# Patient Record
Sex: MALE | Race: WHITE | NOT HISPANIC OR LATINO | Employment: OTHER | URBAN - METROPOLITAN AREA
[De-identification: names, ages, dates, MRNs, and addresses within clinical notes are randomized per-mention and may not be internally consistent; named-entity substitution may affect disease eponyms.]

---

## 2023-03-05 PROCEDURE — 99308 SBSQ NF CARE LOW MDM 20: CPT | Performed by: INTERNAL MEDICINE

## 2023-04-02 PROCEDURE — 99308 SBSQ NF CARE LOW MDM 20: CPT | Performed by: INTERNAL MEDICINE

## 2023-05-02 ENCOUNTER — NURSING HOME VISIT (OUTPATIENT)
Dept: POST ACUTE CARE | Facility: EXTERNAL LOCATION | Age: 88
End: 2023-05-02
Payer: COMMERCIAL

## 2023-05-02 DIAGNOSIS — N40.0 BENIGN PROSTATIC HYPERPLASIA, UNSPECIFIED WHETHER LOWER URINARY TRACT SYMPTOMS PRESENT: ICD-10-CM

## 2023-05-02 DIAGNOSIS — E11.9 TYPE 2 DIABETES MELLITUS WITHOUT COMPLICATION, WITH LONG-TERM CURRENT USE OF INSULIN (MULTI): Primary | ICD-10-CM

## 2023-05-02 DIAGNOSIS — I25.10 CORONARY ARTERY DISEASE, UNSPECIFIED VESSEL OR LESION TYPE, UNSPECIFIED WHETHER ANGINA PRESENT, UNSPECIFIED WHETHER NATIVE OR TRANSPLANTED HEART: ICD-10-CM

## 2023-05-02 DIAGNOSIS — Z79.4 TYPE 2 DIABETES MELLITUS WITHOUT COMPLICATION, WITH LONG-TERM CURRENT USE OF INSULIN (MULTI): Primary | ICD-10-CM

## 2023-05-02 DIAGNOSIS — E56.9 VITAMIN DEFICIENCY: ICD-10-CM

## 2023-05-02 DIAGNOSIS — E03.9 HYPOTHYROIDISM, UNSPECIFIED TYPE: ICD-10-CM

## 2023-05-02 DIAGNOSIS — I10 ESSENTIAL HYPERTENSION: ICD-10-CM

## 2023-05-02 PROCEDURE — 99309 SBSQ NF CARE MODERATE MDM 30: CPT | Performed by: NURSE PRACTITIONER

## 2023-05-02 NOTE — LETTER
Patient: Clive Woody  : 1934    Encounter Date: 2023    *Provider Impression*  Patient is a 89 year y/o male who is seen today for management of multiple medical problems  #T2DM - lantus 10units QHS, glipizide 5mg daily; tradjenta 5mg daily, metformin 1000mg BID  #Vitamin D deficiency - vitamin D 2000units daily  #Hypothyroidism - levothyroxine 88mcg daily  #BPH - finasteride 5mg daily, tamsulosin 0.4mg QPM  #HTN / CAD - amlodipine 2.5mg daily, atorvastatin 5mg daily, lisinopril 10mg daily  #Psychosis - mgmt per [employer] - risperidone, trileptal, ativan  Follow up as needed      *Chief Complaint*  follow up     *History of Present Illness*  Pt is an 88 y/o male LTC resident of [facility] w/ PMH as below who is seen today for follow up and management of multiple medical problems.    He has continued to refuse labs    He is seen today ambulating in the hallway. He is unable to provide any history d/t aphasia.    Allergies - Tuberculin  PMH - HTN, CAD, T2DM, Hypothyroidism, BPH  PSH - unobtainable  FH - unobtainable  SocHx - No EtOH, non smoker      *Review of Systems*  All other systems reviewed are negative except as noted in the HPI      *Vitals*  Date: 23 - T: 97.7 P: 82 R: 18 BP: 134/81 SpO2: 98% on RA; Date: 23 - Wt: 194      *Results/Data*  CBC - Date: 22 WBC: 6.29 HGB: 12.5 HCT: 41.3 PLT: 273 ;   BMP - Date: 22 Na: 141 K: 4.19 Cl: 105 CO2: 27.4 BUN: 17.4 Cr: 1.12 Glu: 234.6 Ca: 9.21 Alb: 4.11 ;   LFT - Date: 22 AST: 12.2 ALT: 10 ALP: 90 Tbili: 0.63 ;   Coags - Date: INR: PT:  Other - Date: 20 - TSH: 1.430, HgbA1c: 6.6%; 20 - CRP: 25.7 D-Dimer: 1.31; 10/4/21 - A1c: 12.7 TSH: 3.100 25-OH-D: 14.29; 3/29/22 - TSH: 2.69; 22 - TSH: 2.44 25-OH-D: 26.44 A1c: 9.7     *Physical Exam*  Gen: (+) NAD, (+) well-appearing  HEENT: (+) normocephalic, (+) MMM, (+) anicteric  Neck: (-) JVD  Lungs: (-) cough, (+) unlabored respirations  Abd: (+) ND  Ext: (-)  edema, (-) deformity  MSK: (-) joint swelling  Skin: (-) rash  Neuro: (+) follows commands, (-) tremor, (+) alert      Electronically Signed By: RIMA Potter   5/7/23  1:01 PM

## 2023-05-07 NOTE — PROGRESS NOTES
*Provider Impression*  Patient is a 89 year y/o male who is seen today for management of multiple medical problems  #T2DM - lantus 10units QHS, glipizide 5mg daily; tradjenta 5mg daily, metformin 1000mg BID  #Vitamin D deficiency - vitamin D 2000units daily  #Hypothyroidism - levothyroxine 88mcg daily  #BPH - finasteride 5mg daily, tamsulosin 0.4mg QPM  #HTN / CAD - amlodipine 2.5mg daily, atorvastatin 5mg daily, lisinopril 10mg daily  #Psychosis - mgmt per [employer] - risperidone, trileptal, ativan  Follow up as needed      *Chief Complaint*  follow up     *History of Present Illness*  Pt is an 88 y/o male LTC resident of [facility] w/ PMH as below who is seen today for follow up and management of multiple medical problems.    He has continued to refuse labs    He is seen today ambulating in the hallway. He is unable to provide any history d/t aphasia.    Allergies - Tuberculin  PMH - HTN, CAD, T2DM, Hypothyroidism, BPH  PSH - unobtainable  FH - unobtainable  SocHx - No EtOH, non smoker      *Review of Systems*  All other systems reviewed are negative except as noted in the HPI      *Vitals*  Date: 4/30/23 - T: 97.7 P: 82 R: 18 BP: 134/81 SpO2: 98% on RA; Date: 5/2/23 - Wt: 194      *Results/Data*  CBC - Date: 4/26/22 WBC: 6.29 HGB: 12.5 HCT: 41.3 PLT: 273 ;   BMP - Date: 4/26/22 Na: 141 K: 4.19 Cl: 105 CO2: 27.4 BUN: 17.4 Cr: 1.12 Glu: 234.6 Ca: 9.21 Alb: 4.11 ;   LFT - Date: 4/26/22 AST: 12.2 ALT: 10 ALP: 90 Tbili: 0.63 ;   Coags - Date: INR: PT:  Other - Date: 5/19/20 - TSH: 1.430, HgbA1c: 6.6%; 11/12/20 - CRP: 25.7 D-Dimer: 1.31; 10/4/21 - A1c: 12.7 TSH: 3.100 25-OH-D: 14.29; 3/29/22 - TSH: 2.69; 4/26/22 - TSH: 2.44 25-OH-D: 26.44 A1c: 9.7     *Physical Exam*  Gen: (+) NAD, (+) well-appearing  HEENT: (+) normocephalic, (+) MMM, (+) anicteric  Neck: (-) JVD  Lungs: (-) cough, (+) unlabored respirations  Abd: (+) ND  Ext: (-) edema, (-) deformity  MSK: (-) joint swelling  Skin: (-) rash  Neuro: (+)  follows commands, (-) tremor, (+) alert

## 2023-06-04 ENCOUNTER — NURSING HOME VISIT (OUTPATIENT)
Dept: POST ACUTE CARE | Facility: EXTERNAL LOCATION | Age: 88
End: 2023-06-04
Payer: COMMERCIAL

## 2023-06-04 DIAGNOSIS — G40.89 OTHER SEIZURES (MULTI): ICD-10-CM

## 2023-06-04 DIAGNOSIS — E03.8 OTHER SPECIFIED HYPOTHYROIDISM: ICD-10-CM

## 2023-06-04 DIAGNOSIS — I10 BENIGN HYPERTENSION: ICD-10-CM

## 2023-06-04 DIAGNOSIS — F20.89 OTHER SCHIZOPHRENIA (MULTI): ICD-10-CM

## 2023-06-04 DIAGNOSIS — R53.1 ASTHENIA: ICD-10-CM

## 2023-06-04 DIAGNOSIS — E11.9 DIABETES MELLITUS WITHOUT COMPLICATION (MULTI): ICD-10-CM

## 2023-06-04 DIAGNOSIS — F03.90 DEMENTIA, UNSPECIFIED DEMENTIA SEVERITY, UNSPECIFIED DEMENTIA TYPE, UNSPECIFIED WHETHER BEHAVIORAL, PSYCHOTIC, OR MOOD DISTURBANCE OR ANXIETY (MULTI): Primary | ICD-10-CM

## 2023-06-04 PROCEDURE — 99307 SBSQ NF CARE SF MDM 10: CPT | Performed by: INTERNAL MEDICINE

## 2023-06-04 NOTE — LETTER
Patient: Clive Woody  : 1934    Encounter Date: 2023    NAME OF THE PATIENT: Clive Woody    YOB: 1934    PLACE OF SERVICE:  [facility]    This is a subsequent visit.    HPI: Mr. Clive Woody is an 89-year-old male with a history of dementia with seizure disorder and schizophrenia. He is unable to care for himself and manage his affairs.  He requires supportive care.    PAST MEDICAL HISTORY:  As per nursing home list.  Dementia, weakness, psychosis, seizures, hypertension, diabetes, hypothyroidism, aphasia, schizophrenia.    CURRENT MEDICATIONS:  As per nursing home list.  Medications reviewed.    ALLERGIES:  As per nursing home list.  No known allergies.    SOCIAL HISTORY:  As per nursing home list. The patient is a nonsmoker and nondrinker.    FAMILY HISTORY:  As per nursing home list.  The patient does not recall his family.    REVIEW OF SYSTEMS:  All 12 systems reviewed and pertaining covered in history and physical, the rest are negative. The patient denies any fever, chills, or chest pain at this time.    PHYSICAL EXAMINATION  GENERAL: This is a well-developed, well-nourished male, sitting in a chair, in no acute distress.  VITAL SIGNS:  As recorded and reviewed from EMR.  Blood pressure 124/80.  Pulse 76.  Respirations 16.  Temperature 97.5.  EYES:  The patient's sclerae white.  The pupils were equal and round.  ENT:  The patient's external ears were normal and the otoscopic examination was negative.  NECK:  Supple.  There were no palpable masses.  Thyroid was not enlarged and there were no carotid bruits.  RESPIRATORY:  The patient had normal inspirations and expirations.  The breath sounds were equal bilaterally and clear to auscultation.  CARDIOVASCULAR:  The patient had S1 normal, split S2 without obvious rubs, clicks, or murmurs.  GASTROINTESTINAL:  There was no hepatosplenomegaly.  There were no palpable masses and no inguinal nodes.  LYMPHATIC:  The  patient had no axillary, groin, or lymphadenopathy.  MUSCULOSKELETAL:  The patient had normal gait.  The joints appeared to be normal without evidence of deformity.  Grossly the muscles had good range of motion and were without effusion.  EXTREMITIES:  There was no evidence of peripheral edema.  NEUROLOGIC:  The patient had normal cranial nerves.  The reflexes, sensory, and motor examination were grossly within normal limits.  PSYCHIATRIC: The patient had normal judgment and insight.  The patient had no obvious mood defect including depression, anxiety, and/or agitation.  MENTAL STATUS EXAMINATION: The patient is alert and oriented x2.    LAB WORK: Laboratory studies were reviewed from prior visit.    ASSESSMENT AND PLAN:  Dementia, unchanged.  Seizure disorder, on anti-epileptic.  Schizophrenia, on medication.  Weakness, on PT/OT.  Fall risk, on fall precaution.  Hypertension, med controlled.  Hypothyroidism, on levothyroxine.  Diabetes, on insulin.  Medication list reviewed and discussed with the family.  Hospital chart reviewed.       Electronically Signed By: Sergio Islas MD   8/1/23  6:43 PM

## 2023-07-03 ENCOUNTER — NURSING HOME VISIT (OUTPATIENT)
Dept: POST ACUTE CARE | Facility: EXTERNAL LOCATION | Age: 88
End: 2023-07-03
Payer: COMMERCIAL

## 2023-07-03 DIAGNOSIS — I10 ESSENTIAL HYPERTENSION: ICD-10-CM

## 2023-07-03 DIAGNOSIS — Z79.4 TYPE 2 DIABETES MELLITUS WITHOUT COMPLICATION, WITH LONG-TERM CURRENT USE OF INSULIN (MULTI): Primary | ICD-10-CM

## 2023-07-03 DIAGNOSIS — N40.0 BENIGN PROSTATIC HYPERPLASIA, UNSPECIFIED WHETHER LOWER URINARY TRACT SYMPTOMS PRESENT: ICD-10-CM

## 2023-07-03 DIAGNOSIS — E11.9 TYPE 2 DIABETES MELLITUS WITHOUT COMPLICATION, WITH LONG-TERM CURRENT USE OF INSULIN (MULTI): Primary | ICD-10-CM

## 2023-07-03 DIAGNOSIS — I25.10 CORONARY ARTERY DISEASE, UNSPECIFIED VESSEL OR LESION TYPE, UNSPECIFIED WHETHER ANGINA PRESENT, UNSPECIFIED WHETHER NATIVE OR TRANSPLANTED HEART: ICD-10-CM

## 2023-07-03 DIAGNOSIS — E56.9 VITAMIN DEFICIENCY: ICD-10-CM

## 2023-07-03 DIAGNOSIS — E03.9 HYPOTHYROIDISM, UNSPECIFIED TYPE: ICD-10-CM

## 2023-07-03 PROCEDURE — 99309 SBSQ NF CARE MODERATE MDM 30: CPT | Performed by: NURSE PRACTITIONER

## 2023-07-07 NOTE — PROGRESS NOTES
*Provider Impression*  Patient is a 89 year y/o male who is seen today for management of multiple medical problems  #T2DM - lantus 10units QHS, glipizide 5mg daily; tradjenta 5mg daily, metformin 1000mg BID  #Vitamin D deficiency - vitamin D 2000units daily  #Hypothyroidism - levothyroxine 88mcg daily  #BPH - finasteride 5mg daily, tamsulosin 0.4mg QPM  #HTN / CAD - amlodipine 2.5mg daily, atorvastatin 5mg daily, lisinopril 10mg daily  #Psychosis - mgmt per [employer] - risperidone, trileptal  Follow up as needed      *Chief Complaint*  follow up     *History of Present Illness*  Pt is an 88 y/o male LTC resident of [facility] w/ PMH as below who is seen today for follow up and management of multiple medical problems.    Still not permitting labs to be drawn.    He is seen today ambulating in the hallway. He is unable to provide any history d/t aphasia.    Allergies - Tuberculin  PMH - HTN, CAD, T2DM, Hypothyroidism, BPH  PSH - unobtainable  FH - unobtainable  SocHx - No EtOH, non smoker      *Review of Systems*  All other systems reviewed are negative except as noted in the HPI      *Vitals*  Date: 6/25/23 - T: 97.9 P: 81 R: 17 BP: 133/79 SpO2: 98% on RA; Date: 6/1/23 - Wt: 193      *Results/Data*  CBC - Date: 4/26/22 WBC: 6.29 HGB: 12.5 HCT: 41.3 PLT: 273 ;   BMP - Date: 4/26/22 Na: 141 K: 4.19 Cl: 105 CO2: 27.4 BUN: 17.4 Cr: 1.12 Glu: 234.6 Ca: 9.21 Alb: 4.11 ;   LFT - Date: 4/26/22 AST: 12.2 ALT: 10 ALP: 90 Tbili: 0.63 ;   Coags - Date: INR: PT:  Other - Date: 5/19/20 - TSH: 1.430, HgbA1c: 6.6%; 11/12/20 - CRP: 25.7 D-Dimer: 1.31; 10/4/21 - A1c: 12.7 TSH: 3.100 25-OH-D: 14.29; 3/29/22 - TSH: 2.69; 4/26/22 - TSH: 2.44 25-OH-D: 26.44 A1c: 9.7     *Physical Exam*  Gen: (+) NAD, (+) well-appearing  HEENT: (+) normocephalic, (+) MMM, (+) anicteric  Neck: (-) JVD  Lungs: (-) cough, (+) unlabored respirations  Abd: (+) ND  Ext: (-) edema, (-) deformity  MSK: (-) joint swelling  Skin: (-) rash  Neuro: (+)  follows commands, (-) tremor, (+) alert

## 2023-07-26 PROBLEM — G40.89 OTHER SEIZURES (MULTI): Status: ACTIVE | Noted: 2023-07-26

## 2023-07-26 PROBLEM — F20.89 OTHER SCHIZOPHRENIA (MULTI): Status: ACTIVE | Noted: 2023-07-26

## 2023-07-26 PROBLEM — E11.9 DIABETES MELLITUS WITHOUT COMPLICATION (MULTI): Status: ACTIVE | Noted: 2023-07-26

## 2023-07-26 PROBLEM — E03.8 OTHER SPECIFIED HYPOTHYROIDISM: Status: ACTIVE | Noted: 2023-07-26

## 2023-07-26 PROBLEM — I10 BENIGN HYPERTENSION: Status: ACTIVE | Noted: 2023-07-26

## 2023-07-26 PROBLEM — F03.90 DEMENTIA (MULTI): Status: ACTIVE | Noted: 2023-07-26

## 2023-07-26 PROBLEM — R53.1 ASTHENIA: Status: ACTIVE | Noted: 2023-07-26

## 2023-08-05 ENCOUNTER — NURSING HOME VISIT (OUTPATIENT)
Dept: POST ACUTE CARE | Facility: EXTERNAL LOCATION | Age: 88
End: 2023-08-05
Payer: COMMERCIAL

## 2023-08-05 DIAGNOSIS — E13.9 DIABETES MELLITUS OF OTHER TYPE WITHOUT COMPLICATION, UNSPECIFIED WHETHER LONG TERM INSULIN USE (MULTI): ICD-10-CM

## 2023-08-05 DIAGNOSIS — N40.0 BENIGN PROSTATIC HYPERPLASIA WITHOUT URINARY OBSTRUCTION: ICD-10-CM

## 2023-08-05 DIAGNOSIS — Z91.81 AT RISK FOR FALLS: ICD-10-CM

## 2023-08-05 DIAGNOSIS — F03.90 DEMENTIA, UNSPECIFIED DEMENTIA SEVERITY, UNSPECIFIED DEMENTIA TYPE, UNSPECIFIED WHETHER BEHAVIORAL, PSYCHOTIC, OR MOOD DISTURBANCE OR ANXIETY (MULTI): Primary | ICD-10-CM

## 2023-08-05 DIAGNOSIS — R53.1 ASTHENIA: ICD-10-CM

## 2023-08-05 DIAGNOSIS — I10 BENIGN HYPERTENSION: ICD-10-CM

## 2023-08-05 DIAGNOSIS — I25.10 ASHD (ARTERIOSCLEROTIC HEART DISEASE): ICD-10-CM

## 2023-08-05 DIAGNOSIS — G40.89 OTHER SEIZURES (MULTI): ICD-10-CM

## 2023-08-05 DIAGNOSIS — F20.89 OTHER SCHIZOPHRENIA (MULTI): ICD-10-CM

## 2023-08-05 DIAGNOSIS — E03.8 OTHER SPECIFIED HYPOTHYROIDISM: ICD-10-CM

## 2023-08-05 PROCEDURE — 99308 SBSQ NF CARE LOW MDM 20: CPT | Performed by: INTERNAL MEDICINE

## 2023-08-05 NOTE — LETTER
Patient: Clive Woody  : 1934    Encounter Date: 2023    NAME OF THE PATIENT: Clive Woody    YOB: 1934    PLACE OF SERVICE:  [facility]    This is a subsequent visit.    HPI: Mr. Clive Woody is an 89-year-old male with a history of dementia with schizophrenia.  He is unable to care for himself.  He requires supportive care.    PAST MEDICAL HISTORY: As per nursing home list.  Dementia, weakness, psychosis, seizures, hypertension, diabetes, hypothyroidism, aphasia, schizophrenia.    CURRENT MEDICATIONS: As per nursing home list.  Medications reviewed.    ALLERGIES: As per nursing home list.  No known allergies.    SOCIAL HISTORY:  As per nursing home list. The patient is a nonsmoker and nondrinker.    FAMILY HISTORY: As per nursing home list.  The patient does not recall his family.    REVIEW OF SYSTEMS: All 12 systems reviewed and pertaining covered in history and physical, the rest are negative. The patient denies any fever, chills, or chest pain at this time.    PHYSICAL EXAMINATION  GENERAL: This is a well-developed and well-nourished male, sitting in a chair, in on acute distress.  VITAL SIGNS:  As recorded and reviewed from EMR.  Blood pressure 124/78.  Pulse 76.  Respirations 16.  Temperature 97.8.  EYES:  The patient's sclerae white.  The pupils were equal and round.  ENT:  The patient's external ears were normal and the otoscopic examination was negative.  NECK:  Supple.  There were no palpable masses.  Thyroid was not enlarged and there were no carotid bruits.  RESPIRATORY:  The patient had normal inspirations and expirations.  The breath sounds were equal bilaterally and clear to auscultation.  CARDIOVASCULAR:  The patient had S1 normal, split S2 without obvious rubs, clicks, or murmurs.  GASTROINTESTINAL:  There was no hepatosplenomegaly.  There were no palpable masses and no inguinal nodes.  LYMPHATIC:  The patient had no axillary, groin, or  lymphadenopathy.  MUSCULOSKELETAL:  The patient had normal gait.  The joints appeared to be normal without evidence of deformity.  Grossly the muscles had good range of motion and were without effusion.  EXTREMITIES:  There was no evidence of peripheral edema.  NEUROLOGIC:  The patient is aphasic.  PSYCHIATRIC: The patient had normal judgment and insight.  The patient was oriented to person, place, and time, and had no obvious mood defect including depression, anxiety, and/or agitation.    LAB WORK:  Laboratory studies were reviewed from prior visit.    ASSESSMENT AND PLAN:  Dementia, unchanged.  Schizophrenia, on medication.  Seizure disorder, on antiepileptic.  Hypertension, med controlled.  Hypothyroidism, on levothyroxine.  ASHD, on aspirin.  BPH, on tamsulosin.  Diabetes, on insulin.  Weakness, on PT/OT.  Fall risk, fall precautions.  Medication list reviewed and discussed with the family.  Hospital chart reviewed.      Electronically Signed By: Sergio Islas MD   10/9/23  3:53 PM

## 2023-08-15 ENCOUNTER — NURSING HOME VISIT (OUTPATIENT)
Dept: POST ACUTE CARE | Facility: EXTERNAL LOCATION | Age: 88
End: 2023-08-15
Payer: COMMERCIAL

## 2023-08-15 DIAGNOSIS — E03.9 HYPOTHYROIDISM, UNSPECIFIED TYPE: ICD-10-CM

## 2023-08-15 DIAGNOSIS — I25.10 CORONARY ARTERY DISEASE, UNSPECIFIED VESSEL OR LESION TYPE, UNSPECIFIED WHETHER ANGINA PRESENT, UNSPECIFIED WHETHER NATIVE OR TRANSPLANTED HEART: ICD-10-CM

## 2023-08-15 DIAGNOSIS — Z79.4 TYPE 2 DIABETES MELLITUS WITHOUT COMPLICATION, WITH LONG-TERM CURRENT USE OF INSULIN (MULTI): ICD-10-CM

## 2023-08-15 DIAGNOSIS — E11.9 TYPE 2 DIABETES MELLITUS WITHOUT COMPLICATION, WITH LONG-TERM CURRENT USE OF INSULIN (MULTI): ICD-10-CM

## 2023-08-15 DIAGNOSIS — R31.9 HEMATURIA, UNSPECIFIED TYPE: Primary | ICD-10-CM

## 2023-08-15 DIAGNOSIS — E56.9 VITAMIN DEFICIENCY: ICD-10-CM

## 2023-08-15 DIAGNOSIS — N40.0 BENIGN PROSTATIC HYPERPLASIA, UNSPECIFIED WHETHER LOWER URINARY TRACT SYMPTOMS PRESENT: ICD-10-CM

## 2023-08-15 DIAGNOSIS — I10 ESSENTIAL HYPERTENSION: ICD-10-CM

## 2023-08-15 PROCEDURE — 99309 SBSQ NF CARE MODERATE MDM 30: CPT | Performed by: NURSE PRACTITIONER

## 2023-08-19 NOTE — PROGRESS NOTES
*Provider Impression*  Patient is a 89 year y/o male who is seen today for management of multiple medical problems  #Hematuria / BPH - finasteride 5mg daily, tamsulosin 0.4mg QPM, check CBC w/ diff, CMP, UA c&s, renal US  #HTN / CAD - atorvastatin 5mg daily, lisinopril 10mg daily, d/c amlodipine  #T2DM - lantus 10units QHS, glipizide 5mg daily; tradjenta 5mg daily, metformin 1000mg BID  #Vitamin D deficiency - vitamin D 2000units daily  #Hypothyroidism - levothyroxine 88mcg daily  #Psychosis - mgmt per [employer] - risperidone, trileptal  Follow up as needed      *Chief Complaint*  edema / hematuria     *History of Present Illness*  Pt is an 89 y/o male LTC resident of [facility] w/ PMH as below who is seen today for follow up and management of multiple medical problems.    He had some hematuria and edema. I ordered labs UA renal US, he refused labs, waiting on all results. There was also some confusion about a valium order he had in place for lab draws previously.    Allergies - Tuberculin  PMH - HTN, CAD, T2DM, Hypothyroidism, BPH  PSH - unobtainable  FH - unobtainable  SocHx - No EtOH, non smoker      *Review of Systems*  All other systems reviewed are negative except as noted in the HPI      *Vitals*  Date: 7/23/23 - T: 97.5 P: 81 R: 17 BP:  SpO2: % on RA; Date: 8/1/23 - Wt: 197      *Results/Data*  CBC - Date: 4/26/22 WBC: 6.29 HGB: 12.5 HCT: 41.3 PLT: 273 ;   BMP - Date: 4/26/22 Na: 141 K: 4.19 Cl: 105 CO2: 27.4 BUN: 17.4 Cr: 1.12 Glu: 234.6 Ca: 9.21 Alb: 4.11 ;   LFT - Date: 4/26/22 AST: 12.2 ALT: 10 ALP: 90 Tbili: 0.63 ;   Coags - Date: INR: PT:  Other - Date: 5/19/20 - TSH: 1.430, HgbA1c: 6.6%; 11/12/20 - CRP: 25.7 D-Dimer: 1.31; 10/4/21 - A1c: 12.7 TSH: 3.100 25-OH-D: 14.29; 3/29/22 - TSH: 2.69; 4/26/22 - TSH: 2.44 25-OH-D: 26.44 A1c: 9.7     *Physical Exam*  Gen: (+) NAD, (+) well-appearing  HEENT: (+) normocephalic, (+) MMM, (+) anicteric  Neck: (-) JVD  Lungs: (-) cough, (+) unlabored  respirations  Abd: (+) ND  Ext: (-) edema, (-) deformity  MSK: (-) joint swelling  Skin: (-) rash  Neuro: (+) follows commands, (-) tremor, (+) alert

## 2023-09-19 ENCOUNTER — NURSING HOME VISIT (OUTPATIENT)
Dept: POST ACUTE CARE | Facility: EXTERNAL LOCATION | Age: 88
End: 2023-09-19
Payer: COMMERCIAL

## 2023-09-19 DIAGNOSIS — E56.9 VITAMIN DEFICIENCY: ICD-10-CM

## 2023-09-19 DIAGNOSIS — I25.10 CORONARY ARTERY DISEASE, UNSPECIFIED VESSEL OR LESION TYPE, UNSPECIFIED WHETHER ANGINA PRESENT, UNSPECIFIED WHETHER NATIVE OR TRANSPLANTED HEART: ICD-10-CM

## 2023-09-19 DIAGNOSIS — E03.9 HYPOTHYROIDISM, UNSPECIFIED TYPE: ICD-10-CM

## 2023-09-19 DIAGNOSIS — F03.90 DEMENTIA, UNSPECIFIED DEMENTIA SEVERITY, UNSPECIFIED DEMENTIA TYPE, UNSPECIFIED WHETHER BEHAVIORAL, PSYCHOTIC, OR MOOD DISTURBANCE OR ANXIETY (MULTI): ICD-10-CM

## 2023-09-19 DIAGNOSIS — Z79.4 TYPE 2 DIABETES MELLITUS WITHOUT COMPLICATION, WITH LONG-TERM CURRENT USE OF INSULIN (MULTI): Primary | ICD-10-CM

## 2023-09-19 DIAGNOSIS — E11.9 TYPE 2 DIABETES MELLITUS WITHOUT COMPLICATION, WITH LONG-TERM CURRENT USE OF INSULIN (MULTI): Primary | ICD-10-CM

## 2023-09-19 DIAGNOSIS — N40.0 BENIGN PROSTATIC HYPERPLASIA, UNSPECIFIED WHETHER LOWER URINARY TRACT SYMPTOMS PRESENT: ICD-10-CM

## 2023-09-19 DIAGNOSIS — I10 ESSENTIAL HYPERTENSION: ICD-10-CM

## 2023-09-19 PROCEDURE — 99309 SBSQ NF CARE MODERATE MDM 30: CPT | Performed by: NURSE PRACTITIONER

## 2023-09-19 NOTE — LETTER
Patient: Clive Woody  : 1934    Encounter Date: 2023    *Provider Impression*  Patient is a 89 year y/o male who is seen today for management of multiple medical problems  #T2DM - lantus 10units QHS, glipizide 5mg daily; tradjenta 5mg daily, metformin 1000mg BID, add empagliflozin 10mg daily  #BPH - finasteride 5mg daily, tamsulosin 0.4mg QPM  #HTN / CAD - atorvastatin 5mg daily, lisinopril 10mg daily  #Vitamin D deficiency - vitamin D 2000units daily  #Hypothyroidism - levothyroxine 88mcg daily  #Dementia - mgmt per [employer] - risperidone, trileptal  #ACP - Full Code  Follow up as needed      *Chief Complaint*  edema / hematuria     *History of Present Illness*  Pt is an 88 y/o male LTC resident of [facility] w/ PMH as below who is seen today for follow up and management of multiple medical problems.    His labs were appreciated as below. A1c is up. Per nursing staff he still has some pedal edema about the same.  He is seen sitting at the end of the cuello. He is aphasic and unable to provide any history but he appears in NAD.     Allergies - Tuberculin  PMH - HTN, CAD, T2DM, Hypothyroidism, BPH  PSH - unobtainable  FH - unobtainable  SocHx - No EtOH, non smoker      *Review of Systems*  All other systems reviewed are negative except as noted in the HPI      *Vitals*  Date: 9/3/23 - T: 97.8 P: 79 R: 17 BP: 141/77  SpO2: 98% on RA; Date: 23 - Wt: 192      *Results/Data*  CBC - Date: 23 WBC: 4.1 HGB: 11.8 HCT: 37.3 PLT: 296 ;   BMP - Date: 23 Na: 141 K: 4.8 Cl: 105 CO2: 25 BUN: 23 Cr: 1.1 Glu: 115 Ca: 9.0 Alb: 3.7 ;   LFT - Date: 23 AST: 19 ALT: 13 ALP: 57 Tbili: 0.7 ;   Coags - Date: INR: PT:  Other - Date: 20 - TSH: 1.430, HgbA1c: 6.6%; 20 - CRP: 25.7 D-Dimer: 1.31; 10/4/21 - A1c: 12.7 TSH: 3.100 25-OH-D: 14.29; 3/29/22 - TSH: 2.69; 22 - TSH: 2.44 25-OH-D: 26.44 A1c: 9.7 ; 23 - A1c: 8.7% TSH: 3.272  25-OH-D: 33.93    *Physical Exam*  Gen: (+) NAD,  (+) well-appearing  HEENT: (+) normocephalic, (+) MMM, (+) anicteric  Neck: (-) JVD  Lungs: (-) cough, (+) unlabored respirations  Abd: (+) ND  Ext: (-) edema, (-) deformity  MSK: (-) joint swelling  Skin: (-) rash  Neuro: (+) follows commands, (-) tremor, (+) alert      Electronically Signed By: GILBERTO Potter-CNP   9/23/23  2:26 PM

## 2023-09-23 NOTE — PROGRESS NOTES
*Provider Impression*  Patient is a 89 year y/o male who is seen today for management of multiple medical problems  #T2DM - lantus 10units QHS, glipizide 5mg daily; tradjenta 5mg daily, metformin 1000mg BID, add empagliflozin 10mg daily  #BPH - finasteride 5mg daily, tamsulosin 0.4mg QPM  #HTN / CAD - atorvastatin 5mg daily, lisinopril 10mg daily  #Vitamin D deficiency - vitamin D 2000units daily  #Hypothyroidism - levothyroxine 88mcg daily  #Dementia - mgmt per [employer] - risperidone, trileptal  #ACP - Full Code  Follow up as needed      *Chief Complaint*  edema / hematuria     *History of Present Illness*  Pt is an 88 y/o male LTC resident of [facility] w/ PMH as below who is seen today for follow up and management of multiple medical problems.    His labs were appreciated as below. A1c is up. Per nursing staff he still has some pedal edema about the same.  He is seen sitting at the end of the cuello. He is aphasic and unable to provide any history but he appears in NAD.     Allergies - Tuberculin  PMH - HTN, CAD, T2DM, Hypothyroidism, BPH  PSH - unobtainable  FH - unobtainable  SocHx - No EtOH, non smoker      *Review of Systems*  All other systems reviewed are negative except as noted in the HPI      *Vitals*  Date: 9/3/23 - T: 97.8 P: 79 R: 17 BP: 141/77  SpO2: 98% on RA; Date: 9/6/23 - Wt: 192      *Results/Data*  CBC - Date: 9/19/23 WBC: 4.1 HGB: 11.8 HCT: 37.3 PLT: 296 ;   BMP - Date: 9/19/23 Na: 141 K: 4.8 Cl: 105 CO2: 25 BUN: 23 Cr: 1.1 Glu: 115 Ca: 9.0 Alb: 3.7 ;   LFT - Date: 9/19/23 AST: 19 ALT: 13 ALP: 57 Tbili: 0.7 ;   Coags - Date: INR: PT:  Other - Date: 5/19/20 - TSH: 1.430, HgbA1c: 6.6%; 11/12/20 - CRP: 25.7 D-Dimer: 1.31; 10/4/21 - A1c: 12.7 TSH: 3.100 25-OH-D: 14.29; 3/29/22 - TSH: 2.69; 4/26/22 - TSH: 2.44 25-OH-D: 26.44 A1c: 9.7 ; 9/19/23 - A1c: 8.7% TSH: 3.272  25-OH-D: 33.93    *Physical Exam*  Gen: (+) NAD, (+) well-appearing  HEENT: (+) normocephalic, (+) MMM, (+) anicteric  Neck:  (-) JVD  Lungs: (-) cough, (+) unlabored respirations  Abd: (+) ND  Ext: (-) edema, (-) deformity  MSK: (-) joint swelling  Skin: (-) rash  Neuro: (+) follows commands, (-) tremor, (+) alert

## 2023-10-01 ENCOUNTER — NURSING HOME VISIT (OUTPATIENT)
Dept: POST ACUTE CARE | Facility: EXTERNAL LOCATION | Age: 88
End: 2023-10-01
Payer: COMMERCIAL

## 2023-10-01 DIAGNOSIS — F20.89 OTHER SCHIZOPHRENIA (MULTI): ICD-10-CM

## 2023-10-01 DIAGNOSIS — E03.9 HYPOTHYROIDISM, UNSPECIFIED TYPE: ICD-10-CM

## 2023-10-01 DIAGNOSIS — F03.90 DEMENTIA, UNSPECIFIED DEMENTIA SEVERITY, UNSPECIFIED DEMENTIA TYPE, UNSPECIFIED WHETHER BEHAVIORAL, PSYCHOTIC, OR MOOD DISTURBANCE OR ANXIETY (MULTI): Primary | ICD-10-CM

## 2023-10-01 DIAGNOSIS — G40.89 OTHER SEIZURES (MULTI): ICD-10-CM

## 2023-10-01 DIAGNOSIS — Z79.4 TYPE 2 DIABETES MELLITUS WITHOUT COMPLICATION, WITH LONG-TERM CURRENT USE OF INSULIN (MULTI): ICD-10-CM

## 2023-10-01 DIAGNOSIS — N40.0 BENIGN PROSTATIC HYPERPLASIA, UNSPECIFIED WHETHER LOWER URINARY TRACT SYMPTOMS PRESENT: ICD-10-CM

## 2023-10-01 DIAGNOSIS — Z91.81 AT RISK FOR FALLS: ICD-10-CM

## 2023-10-01 DIAGNOSIS — I25.10 ASHD (ARTERIOSCLEROTIC HEART DISEASE): ICD-10-CM

## 2023-10-01 DIAGNOSIS — R53.1 WEAKNESS: ICD-10-CM

## 2023-10-01 DIAGNOSIS — I10 BENIGN HYPERTENSION: ICD-10-CM

## 2023-10-01 DIAGNOSIS — E11.9 TYPE 2 DIABETES MELLITUS WITHOUT COMPLICATION, WITH LONG-TERM CURRENT USE OF INSULIN (MULTI): ICD-10-CM

## 2023-10-01 PROCEDURE — 99308 SBSQ NF CARE LOW MDM 20: CPT | Performed by: INTERNAL MEDICINE

## 2023-10-01 NOTE — LETTER
Patient: Clive Woody  : 1934    Encounter Date: 10/01/2023    Subjective  Patient ID: Clive Woody is a 89 y.o. male who presents for Follow-up.    Mr. Clive Woody is an 89-year-old male with a history of dementia as well as seizure disorder.  He is unable to care for himself and requires supportive care.    Review of Systems   Constitutional:  Negative for chills and fever.   Cardiovascular:  Negative for chest pain.   All other systems reviewed and are negative.    Objective  /78   Pulse 74   Temp 36.3 °C (97.4 °F)   Resp 16     Physical Exam  Vitals reviewed.   Constitutional:       General: He is not in acute distress.     Appearance: He is well-developed.      Comments: This is a well-nourished male.   HENT:      Right Ear: Tympanic membrane, ear canal and external ear normal.      Left Ear: Tympanic membrane, ear canal and external ear normal.   Eyes:      General: No scleral icterus.     Pupils: Pupils are equal, round, and reactive to light.   Neck:      Vascular: No carotid bruit.   Cardiovascular:      Heart sounds: Normal heart sounds, S1 normal and S2 normal. No murmur heard.     No friction rub.   Pulmonary:      Effort: Pulmonary effort is normal.      Breath sounds: Normal breath sounds and air entry.   Abdominal:      Palpations: There is no hepatomegaly, splenomegaly or mass.   Musculoskeletal:         General: No swelling or deformity. Normal range of motion.      Cervical back: Neck supple.      Right lower leg: No edema.      Left lower leg: No edema.   Lymphadenopathy:      Cervical: No cervical adenopathy.      Upper Body:      Right upper body: No axillary adenopathy.      Left upper body: No axillary adenopathy.      Lower Body: No right inguinal adenopathy. No left inguinal adenopathy.   Neurological:      Mental Status: He is alert.      Cranial Nerves: Cranial nerves 2-12 are intact. No cranial nerve deficit.      Sensory: No sensory deficit.      Motor:  Motor function is intact. No weakness.      Gait: Gait is intact.      Deep Tendon Reflexes: Reflexes normal.      Comments: The patient is oriented x2.   Psychiatric:         Mood and Affect: Mood normal. Mood is not anxious or depressed. Affect is not angry.         Behavior: Behavior is not agitated.         Thought Content: Thought content normal.         Judgment: Judgment normal.     LAB WORK:  Laboratory studies were reviewed.    Assessment/Plan  Problem List Items Addressed This Visit             ICD-10-CM       Advance Directives and General Issues    At risk for falls Z91.81       Cardiac and Vasculature    Benign hypertension I10    ASHD (arteriosclerotic heart disease) I25.10       Endocrine/Metabolic    Diabetes mellitus (CMS/[facility]) E11.9       Mental Health    Other schizophrenia (CMS/[facility]) F20.89       Neuro    Dementia (CMS/[facility]) - Primary F03.90    Other seizures (CMS/[facility]) G40.89     Other Visit Diagnoses         Codes    Hypothyroidism, unspecified type     E03.9    Weakness     R53.1    Benign prostatic hyperplasia, unspecified whether lower urinary tract symptoms present     N40.0        1. Dementia, unchanged.  2. Schizophrenia, on medication.  3. Seizure disorder, on antiepileptic.  4. Hypothyroidism, on levothyroxine.  5. Hypertension, medically controlled.  6. ASHD, on aspirin.  7. Weakness, on PT/OT.  8. Fall risk, on fall precautions.  9. BPH, on tamsulosin.  10. Diabetes, on insulin.    Scribe Attestation  By signing my name below, IChastity, Scribolaf attest that this documentation has been prepared under the direction and in the presence of Sergio Islas MD.       Electronically Signed By: Sergio Islas MD   11/16/23  6:11 PM

## 2023-10-07 PROBLEM — E11.9 DIABETES MELLITUS (MULTI): Status: ACTIVE | Noted: 2023-10-07

## 2023-10-07 PROBLEM — N40.0 BENIGN PROSTATIC HYPERPLASIA WITHOUT URINARY OBSTRUCTION: Status: ACTIVE | Noted: 2023-10-07

## 2023-10-07 PROBLEM — Z91.81 AT RISK FOR FALLS: Status: ACTIVE | Noted: 2023-10-07

## 2023-10-07 PROBLEM — I25.10 ASHD (ARTERIOSCLEROTIC HEART DISEASE): Status: ACTIVE | Noted: 2023-10-07

## 2023-10-08 NOTE — PROGRESS NOTES
NAME OF THE PATIENT: Clive Woody    YOB: 1934    PLACE OF SERVICE:  [facility]    This is a subsequent visit.    HPI: Mr. Clive Woody is an 89-year-old male with a history of dementia with schizophrenia.  He is unable to care for himself.  He requires supportive care.    PAST MEDICAL HISTORY: As per nursing home list.  Dementia, weakness, psychosis, seizures, hypertension, diabetes, hypothyroidism, aphasia, schizophrenia.    CURRENT MEDICATIONS: As per nursing home list.  Medications reviewed.    ALLERGIES: As per nursing home list.  No known allergies.    SOCIAL HISTORY:  As per nursing home list. The patient is a nonsmoker and nondrinker.    FAMILY HISTORY: As per nursing home list.  The patient does not recall his family.    REVIEW OF SYSTEMS: All 12 systems reviewed and pertaining covered in history and physical, the rest are negative. The patient denies any fever, chills, or chest pain at this time.    PHYSICAL EXAMINATION  GENERAL: This is a well-developed and well-nourished male, sitting in a chair, in on acute distress.  VITAL SIGNS:  As recorded and reviewed from EMR.  Blood pressure 124/78.  Pulse 76.  Respirations 16.  Temperature 97.8.  EYES:  The patient's sclerae white.  The pupils were equal and round.  ENT:  The patient's external ears were normal and the otoscopic examination was negative.  NECK:  Supple.  There were no palpable masses.  Thyroid was not enlarged and there were no carotid bruits.  RESPIRATORY:  The patient had normal inspirations and expirations.  The breath sounds were equal bilaterally and clear to auscultation.  CARDIOVASCULAR:  The patient had S1 normal, split S2 without obvious rubs, clicks, or murmurs.  GASTROINTESTINAL:  There was no hepatosplenomegaly.  There were no palpable masses and no inguinal nodes.  LYMPHATIC:  The patient had no axillary, groin, or lymphadenopathy.  MUSCULOSKELETAL:  The patient had normal gait.  The joints  appeared to be normal without evidence of deformity.  Grossly the muscles had good range of motion and were without effusion.  EXTREMITIES:  There was no evidence of peripheral edema.  NEUROLOGIC:  The patient is aphasic.  PSYCHIATRIC: The patient had normal judgment and insight.  The patient was oriented to person, place, and time, and had no obvious mood defect including depression, anxiety, and/or agitation.    LAB WORK:  Laboratory studies were reviewed from prior visit.    ASSESSMENT AND PLAN:  Dementia, unchanged.  Schizophrenia, on medication.  Seizure disorder, on antiepileptic.  Hypertension, med controlled.  Hypothyroidism, on levothyroxine.  ASHD, on aspirin.  BPH, on tamsulosin.  Diabetes, on insulin.  Weakness, on PT/OT.  Fall risk, fall precautions.  Medication list reviewed and discussed with the family.  Hospital chart reviewed.

## 2023-10-13 VITALS
HEART RATE: 74 BPM | TEMPERATURE: 97.4 F | DIASTOLIC BLOOD PRESSURE: 78 MMHG | RESPIRATION RATE: 16 BRPM | SYSTOLIC BLOOD PRESSURE: 126 MMHG

## 2023-10-13 ASSESSMENT — ENCOUNTER SYMPTOMS
FEVER: 0
CHILLS: 0

## 2023-10-13 NOTE — PROGRESS NOTES
Subjective   Patient ID: Clive Woody is a 89 y.o. male who presents for Follow-up.    Mr. Clive Woody is an 89-year-old male with a history of dementia as well as seizure disorder.  He is unable to care for himself and requires supportive care.    Review of Systems   Constitutional:  Negative for chills and fever.   Cardiovascular:  Negative for chest pain.   All other systems reviewed and are negative.    Objective   /78   Pulse 74   Temp 36.3 °C (97.4 °F)   Resp 16     Physical Exam  Vitals reviewed.   Constitutional:       General: He is not in acute distress.     Appearance: He is well-developed.      Comments: This is a well-nourished male.   HENT:      Right Ear: Tympanic membrane, ear canal and external ear normal.      Left Ear: Tympanic membrane, ear canal and external ear normal.   Eyes:      General: No scleral icterus.     Pupils: Pupils are equal, round, and reactive to light.   Neck:      Vascular: No carotid bruit.   Cardiovascular:      Heart sounds: Normal heart sounds, S1 normal and S2 normal. No murmur heard.     No friction rub.   Pulmonary:      Effort: Pulmonary effort is normal.      Breath sounds: Normal breath sounds and air entry.   Abdominal:      Palpations: There is no hepatomegaly, splenomegaly or mass.   Musculoskeletal:         General: No swelling or deformity. Normal range of motion.      Cervical back: Neck supple.      Right lower leg: No edema.      Left lower leg: No edema.   Lymphadenopathy:      Cervical: No cervical adenopathy.      Upper Body:      Right upper body: No axillary adenopathy.      Left upper body: No axillary adenopathy.      Lower Body: No right inguinal adenopathy. No left inguinal adenopathy.   Neurological:      Mental Status: He is alert.      Cranial Nerves: Cranial nerves 2-12 are intact. No cranial nerve deficit.      Sensory: No sensory deficit.      Motor: Motor function is intact. No weakness.      Gait: Gait is intact.      Deep  Tendon Reflexes: Reflexes normal.      Comments: The patient is oriented x2.   Psychiatric:         Mood and Affect: Mood normal. Mood is not anxious or depressed. Affect is not angry.         Behavior: Behavior is not agitated.         Thought Content: Thought content normal.         Judgment: Judgment normal.     LAB WORK:  Laboratory studies were reviewed.    Assessment/Plan   Problem List Items Addressed This Visit             ICD-10-CM       Advance Directives and General Issues    At risk for falls Z91.81       Cardiac and Vasculature    Benign hypertension I10    ASHD (arteriosclerotic heart disease) I25.10       Endocrine/Metabolic    Diabetes mellitus (CMS/[facility]) E11.9       Mental Health    Other schizophrenia (CMS/[facility]) F20.89       Neuro    Dementia (CMS/[facility]) - Primary F03.90    Other seizures (CMS/[facility]) G40.89     Other Visit Diagnoses         Codes    Hypothyroidism, unspecified type     E03.9    Weakness     R53.1    Benign prostatic hyperplasia, unspecified whether lower urinary tract symptoms present     N40.0        1. Dementia, unchanged.  2. Schizophrenia, on medication.  3. Seizure disorder, on antiepileptic.  4. Hypothyroidism, on levothyroxine.  5. Hypertension, medically controlled.  6. ASHD, on aspirin.  7. Weakness, on PT/OT.  8. Fall risk, on fall precautions.  9. BPH, on tamsulosin.  10. Diabetes, on insulin.    Scribe Attestation  By signing my name below, IChastity, Scribolaf attest that this documentation has been prepared under the direction and in the presence of Sergio Islas MD.

## 2023-11-01 ENCOUNTER — NURSING HOME VISIT (OUTPATIENT)
Dept: POST ACUTE CARE | Facility: EXTERNAL LOCATION | Age: 88
End: 2023-11-01
Payer: COMMERCIAL

## 2023-11-01 DIAGNOSIS — F20.89 OTHER SCHIZOPHRENIA (MULTI): ICD-10-CM

## 2023-11-01 DIAGNOSIS — I10 BENIGN HYPERTENSION: ICD-10-CM

## 2023-11-01 DIAGNOSIS — G40.89 OTHER SEIZURES (MULTI): ICD-10-CM

## 2023-11-01 DIAGNOSIS — R53.1 WEAKNESS: ICD-10-CM

## 2023-11-01 DIAGNOSIS — E03.9 HYPOTHYROIDISM, UNSPECIFIED TYPE: ICD-10-CM

## 2023-11-01 DIAGNOSIS — Z91.81 AT RISK FOR FALLS: ICD-10-CM

## 2023-11-01 DIAGNOSIS — Z79.4 TYPE 2 DIABETES MELLITUS WITHOUT COMPLICATION, WITH LONG-TERM CURRENT USE OF INSULIN (MULTI): ICD-10-CM

## 2023-11-01 DIAGNOSIS — E11.9 TYPE 2 DIABETES MELLITUS WITHOUT COMPLICATION, WITH LONG-TERM CURRENT USE OF INSULIN (MULTI): ICD-10-CM

## 2023-11-01 DIAGNOSIS — F03.90 DEMENTIA, UNSPECIFIED DEMENTIA SEVERITY, UNSPECIFIED DEMENTIA TYPE, UNSPECIFIED WHETHER BEHAVIORAL, PSYCHOTIC, OR MOOD DISTURBANCE OR ANXIETY (MULTI): Primary | ICD-10-CM

## 2023-11-01 PROCEDURE — 99308 SBSQ NF CARE LOW MDM 20: CPT | Performed by: INTERNAL MEDICINE

## 2023-11-01 NOTE — LETTER
Patient: Clive Woody  : 1934    Encounter Date: 2023    PLACE OF SERVICE:  [facility].    This is a subsequent visit.    Subjective  Patient ID: Clive Woody is a 89 y.o. male who presents for Follow-up.    Mr. Clive Woody is an 89-year-old male with history of dementia as well as seizure disorder.  He has several medical issues and is unable to care for himself.  He requires supportive care.    Review of Systems   Constitutional:  Negative for chills and fever.   Cardiovascular:  Negative for chest pain.   All other systems reviewed and are negative.    Objective  /78   Pulse 72   Temp 36.4 °C (97.5 °F)   Resp 16     Physical Exam  Vitals reviewed.   Constitutional:       General: He is not in acute distress.     Comments: This is a well-developed, well-nourished male, sitting in a chair.   HENT:      Right Ear: Tympanic membrane, ear canal and external ear normal.      Left Ear: Tympanic membrane, ear canal and external ear normal.   Eyes:      General: No scleral icterus.     Pupils: Pupils are equal, round, and reactive to light.   Neck:      Vascular: No carotid bruit.   Cardiovascular:      Heart sounds: Normal heart sounds, S1 normal and S2 normal. No murmur heard.     No friction rub.   Pulmonary:      Effort: Pulmonary effort is normal.      Breath sounds: Normal breath sounds and air entry.   Abdominal:      Palpations: There is no hepatomegaly, splenomegaly or mass.   Musculoskeletal:         General: No swelling or deformity. Normal range of motion.      Cervical back: Neck supple.      Right lower leg: No edema.      Left lower leg: No edema.   Lymphadenopathy:      Cervical: No cervical adenopathy.      Upper Body:      Right upper body: No axillary adenopathy.      Left upper body: No axillary adenopathy.      Lower Body: No right inguinal adenopathy. No left inguinal adenopathy.   Neurological:      Mental Status: He is alert.      Cranial Nerves:  Cranial nerves 2-12 are intact. No cranial nerve deficit.      Sensory: No sensory deficit.      Motor: Motor function is intact. No weakness.      Gait: Gait is intact.      Deep Tendon Reflexes: Reflexes normal.      Comments: The patient is oriented x2.   Psychiatric:         Mood and Affect: Mood normal. Mood is not anxious or depressed. Affect is not angry.         Behavior: Behavior is not agitated.         Thought Content: Thought content normal.         Judgment: Judgment normal.     LAB WORK:  Laboratory studies were reviewed.    Assessment/Plan  Problem List Items Addressed This Visit             ICD-10-CM       Advance Directives and General Issues    At risk for falls Z91.81       Cardiac and Vasculature    Benign hypertension I10       Endocrine/Metabolic    Diabetes mellitus (CMS/[facility]) E11.9       Mental Health    Other schizophrenia (CMS/[facility]) F20.89       Neuro    Dementia (CMS/[facility]) - Primary F03.90    Other seizures (CMS/[facility]) G40.89     Other Visit Diagnoses         Codes    Weakness     R53.1    Hypothyroidism, unspecified type     E03.9        1. Dementia, unchanged.  2. Schizophrenia, on medication.  3. Weakness, on PT/OT.  4. Fall risk, on fall precautions.  5. Diabetes, on insulin.  6. Seizure disorder, on antiepileptic.  7. Hypothyroidism, on levothyroxine.  8. Hypertension, medically controlled.    Scribe Attestation  By signing my name below, I, Joyce Junior attest that this documentation has been prepared under the direction and in the presence of Sergio Islas MD.   All medical record entries made by the scribe were personally dictated by me I have reviewed the chart and agree the record accurately reflects my personal performance of his history physical examination and management      Electronically Signed By: Sergio Islas MD   11/10/23  9:52 AM

## 2023-11-09 VITALS
SYSTOLIC BLOOD PRESSURE: 126 MMHG | TEMPERATURE: 97.5 F | RESPIRATION RATE: 16 BRPM | HEART RATE: 72 BPM | DIASTOLIC BLOOD PRESSURE: 78 MMHG

## 2023-11-09 ASSESSMENT — ENCOUNTER SYMPTOMS
CHILLS: 0
FEVER: 0

## 2023-11-09 NOTE — PROGRESS NOTES
PLACE OF SERVICE:  [facility].    This is a subsequent visit.    Subjective   Patient ID: Clive Woody is a 89 y.o. male who presents for Follow-up.    Mr. Clive Woody is an 89-year-old male with history of dementia as well as seizure disorder.  He has several medical issues and is unable to care for himself.  He requires supportive care.    Review of Systems   Constitutional:  Negative for chills and fever.   Cardiovascular:  Negative for chest pain.   All other systems reviewed and are negative.    Objective   /78   Pulse 72   Temp 36.4 °C (97.5 °F)   Resp 16     Physical Exam  Vitals reviewed.   Constitutional:       General: He is not in acute distress.     Comments: This is a well-developed, well-nourished male, sitting in a chair.   HENT:      Right Ear: Tympanic membrane, ear canal and external ear normal.      Left Ear: Tympanic membrane, ear canal and external ear normal.   Eyes:      General: No scleral icterus.     Pupils: Pupils are equal, round, and reactive to light.   Neck:      Vascular: No carotid bruit.   Cardiovascular:      Heart sounds: Normal heart sounds, S1 normal and S2 normal. No murmur heard.     No friction rub.   Pulmonary:      Effort: Pulmonary effort is normal.      Breath sounds: Normal breath sounds and air entry.   Abdominal:      Palpations: There is no hepatomegaly, splenomegaly or mass.   Musculoskeletal:         General: No swelling or deformity. Normal range of motion.      Cervical back: Neck supple.      Right lower leg: No edema.      Left lower leg: No edema.   Lymphadenopathy:      Cervical: No cervical adenopathy.      Upper Body:      Right upper body: No axillary adenopathy.      Left upper body: No axillary adenopathy.      Lower Body: No right inguinal adenopathy. No left inguinal adenopathy.   Neurological:      Mental Status: He is alert.      Cranial Nerves: Cranial nerves 2-12 are intact. No cranial nerve deficit.      Sensory: No  sensory deficit.      Motor: Motor function is intact. No weakness.      Gait: Gait is intact.      Deep Tendon Reflexes: Reflexes normal.      Comments: The patient is oriented x2.   Psychiatric:         Mood and Affect: Mood normal. Mood is not anxious or depressed. Affect is not angry.         Behavior: Behavior is not agitated.         Thought Content: Thought content normal.         Judgment: Judgment normal.     LAB WORK:  Laboratory studies were reviewed.    Assessment/Plan   Problem List Items Addressed This Visit             ICD-10-CM       Advance Directives and General Issues    At risk for falls Z91.81       Cardiac and Vasculature    Benign hypertension I10       Endocrine/Metabolic    Diabetes mellitus (CMS/[facility]) E11.9       Mental Health    Other schizophrenia (CMS/[facility]) F20.89       Neuro    Dementia (CMS/[facility]CC) - Primary F03.90    Other seizures (CMS/HCC) G40.89     Other Visit Diagnoses         Codes    Weakness     R53.1    Hypothyroidism, unspecified type     E03.9        1. Dementia, unchanged.  2. Schizophrenia, on medication.  3. Weakness, on PT/OT.  4. Fall risk, on fall precautions.  5. Diabetes, on insulin.  6. Seizure disorder, on antiepileptic.  7. Hypothyroidism, on levothyroxine.  8. Hypertension, medically controlled.    Scribe Attestation  By signing my name below, I, Joyce Junior attest that this documentation has been prepared under the direction and in the presence of Sergio Islas MD.   All medical record entries made by the scribe were personally dictated by me I have reviewed the chart and agree the record accurately reflects my personal performance of his history physical examination and management

## 2023-12-12 ENCOUNTER — NURSING HOME VISIT (OUTPATIENT)
Dept: POST ACUTE CARE | Facility: EXTERNAL LOCATION | Age: 88
End: 2023-12-12
Payer: COMMERCIAL

## 2023-12-12 DIAGNOSIS — Z79.4 TYPE 2 DIABETES MELLITUS WITHOUT COMPLICATION, WITH LONG-TERM CURRENT USE OF INSULIN (MULTI): Primary | ICD-10-CM

## 2023-12-12 DIAGNOSIS — F03.90 DEMENTIA, UNSPECIFIED DEMENTIA SEVERITY, UNSPECIFIED DEMENTIA TYPE, UNSPECIFIED WHETHER BEHAVIORAL, PSYCHOTIC, OR MOOD DISTURBANCE OR ANXIETY (MULTI): ICD-10-CM

## 2023-12-12 DIAGNOSIS — E56.9 VITAMIN DEFICIENCY: ICD-10-CM

## 2023-12-12 DIAGNOSIS — N40.0 BENIGN PROSTATIC HYPERPLASIA, UNSPECIFIED WHETHER LOWER URINARY TRACT SYMPTOMS PRESENT: ICD-10-CM

## 2023-12-12 DIAGNOSIS — E11.9 TYPE 2 DIABETES MELLITUS WITHOUT COMPLICATION, WITH LONG-TERM CURRENT USE OF INSULIN (MULTI): Primary | ICD-10-CM

## 2023-12-12 DIAGNOSIS — I25.10 ASHD (ARTERIOSCLEROTIC HEART DISEASE): ICD-10-CM

## 2023-12-12 DIAGNOSIS — I10 BENIGN HYPERTENSION: ICD-10-CM

## 2023-12-12 DIAGNOSIS — E03.9 HYPOTHYROIDISM, UNSPECIFIED TYPE: ICD-10-CM

## 2023-12-12 PROCEDURE — 99309 SBSQ NF CARE MODERATE MDM 30: CPT | Performed by: NURSE PRACTITIONER

## 2024-01-07 ENCOUNTER — NURSING HOME VISIT (OUTPATIENT)
Dept: POST ACUTE CARE | Facility: EXTERNAL LOCATION | Age: 89
End: 2024-01-07
Payer: COMMERCIAL

## 2024-01-07 DIAGNOSIS — Z91.81 AT RISK FOR FALLS: ICD-10-CM

## 2024-01-07 DIAGNOSIS — F03.90 DEMENTIA, UNSPECIFIED DEMENTIA SEVERITY, UNSPECIFIED DEMENTIA TYPE, UNSPECIFIED WHETHER BEHAVIORAL, PSYCHOTIC, OR MOOD DISTURBANCE OR ANXIETY (MULTI): Primary | ICD-10-CM

## 2024-01-07 DIAGNOSIS — N40.0 BENIGN PROSTATIC HYPERPLASIA, UNSPECIFIED WHETHER LOWER URINARY TRACT SYMPTOMS PRESENT: ICD-10-CM

## 2024-01-07 DIAGNOSIS — F20.9 SCHIZOPHRENIA, UNSPECIFIED TYPE (MULTI): ICD-10-CM

## 2024-01-07 DIAGNOSIS — Z79.4 TYPE 2 DIABETES MELLITUS WITHOUT COMPLICATION, WITH LONG-TERM CURRENT USE OF INSULIN (MULTI): ICD-10-CM

## 2024-01-07 DIAGNOSIS — R53.1 WEAKNESS: ICD-10-CM

## 2024-01-07 DIAGNOSIS — E11.9 TYPE 2 DIABETES MELLITUS WITHOUT COMPLICATION, WITH LONG-TERM CURRENT USE OF INSULIN (MULTI): ICD-10-CM

## 2024-01-07 DIAGNOSIS — G40.909 SEIZURE DISORDER (MULTI): ICD-10-CM

## 2024-01-07 DIAGNOSIS — E03.9 HYPOTHYROIDISM, UNSPECIFIED TYPE: ICD-10-CM

## 2024-01-07 DIAGNOSIS — I10 BENIGN HYPERTENSION: ICD-10-CM

## 2024-01-07 PROCEDURE — 99308 SBSQ NF CARE LOW MDM 20: CPT | Performed by: INTERNAL MEDICINE

## 2024-01-07 NOTE — LETTER
Patient: Clive Woody  : 1934    Encounter Date: 2024    PLACE OF SERVICE:  [facility]    This is a subsequent visit.    Subjective  Patient ID: Clive Woody is a 89 y.o. male who presents for Follow-up.    Mr. Clive Woody is an 89-year-old male with history of dementia with history of seizure disorder and schizophrenia.  He is unable to care for himself and manage his affairs.  He requires supportive care.    Review of Systems   Constitutional:  Negative for chills and fever.   Cardiovascular:  Negative for chest pain.   All other systems reviewed and are negative.    Objective  /78   Pulse 78   Temp 36.6 °C (97.8 °F)   Resp 16     Physical Exam  Vitals reviewed.   Constitutional:       General: He is not in acute distress.     Comments: This is a well-developed, well-nourished male, sitting in a chair.   HENT:      Right Ear: Tympanic membrane, ear canal and external ear normal.      Left Ear: Tympanic membrane, ear canal and external ear normal.   Eyes:      General: No scleral icterus.     Pupils: Pupils are equal, round, and reactive to light.   Neck:      Vascular: No carotid bruit.   Cardiovascular:      Heart sounds: Normal heart sounds, S1 normal and S2 normal. No murmur heard.     No friction rub.   Pulmonary:      Effort: Pulmonary effort is normal.      Breath sounds: Normal breath sounds and air entry.   Abdominal:      Palpations: There is no hepatomegaly, splenomegaly or mass.   Musculoskeletal:         General: No swelling or deformity. Normal range of motion.      Cervical back: Neck supple.      Right lower leg: No edema.      Left lower leg: No edema.   Lymphadenopathy:      Cervical: No cervical adenopathy.      Upper Body:      Right upper body: No axillary adenopathy.      Left upper body: No axillary adenopathy.      Lower Body: No right inguinal adenopathy. No left inguinal adenopathy.   Neurological:      Cranial Nerves: Cranial nerves 2-12 are  intact. No cranial nerve deficit.      Sensory: No sensory deficit.      Motor: Motor function is intact. No weakness.      Gait: Gait is intact.      Deep Tendon Reflexes: Reflexes normal.      Comments: The patient is alert and oriented x2.   Psychiatric:         Mood and Affect: Mood normal. Mood is not anxious or depressed. Affect is not angry.         Behavior: Behavior is not agitated.         Thought Content: Thought content normal.         Judgment: Judgment normal.     LAB WORK: Laboratory studies reviewed.    Assessment/Plan  Problem List Items Addressed This Visit             ICD-10-CM       Advance Directives and General Issues    At risk for falls Z91.81       Cardiac and Vasculature    Benign hypertension I10       Endocrine/Metabolic    Diabetes mellitus (CMS/[facility]) E11.9       Neuro    Dementia (CMS/[facility]) - Primary F03.90     Other Visit Diagnoses         Codes    Seizure disorder (CMS/[facility])     G40.909    Schizophrenia, unspecified type (CMS/[facility])     F20.9    Benign prostatic hyperplasia, unspecified whether lower urinary tract symptoms present     N40.0    Weakness     R53.1    Hypothyroidism, unspecified type     E03.9        1. Dementia, unchanged.  2. Seizure disorder, on antiepileptic.  3. Schizophrenia, on medication.  4. Diabetes, on insulin.  5. BPH, on tamsulosin.  6. Weakness, on PT, OT.  7. Fall risk, fall precaution.  8. Hypothyroidism, on levothyroxine.  9. Hypertension, med controlled.    Scribe Attestation  By signing my name below, IChastity Scribe attest that this documentation has been prepared under the direction and in the presence of Sergio Islas MD.   All medical record entries made by the scribe were personally dictated by me I have reviewed the chart and agree the record accurately reflects my personal performance of his history physical examination and management      Electronically Signed By: Sergio Islas MD   1/9/24  4:35 PM

## 2024-01-08 VITALS
RESPIRATION RATE: 16 BRPM | HEART RATE: 78 BPM | TEMPERATURE: 97.8 F | SYSTOLIC BLOOD PRESSURE: 126 MMHG | DIASTOLIC BLOOD PRESSURE: 78 MMHG

## 2024-01-08 ASSESSMENT — ENCOUNTER SYMPTOMS
CHILLS: 0
FEVER: 0

## 2024-01-09 NOTE — PROGRESS NOTES
PLACE OF SERVICE:  [facility]    This is a subsequent visit.    Subjective   Patient ID: Clive Woody is a 89 y.o. male who presents for Follow-up.    Mr. Clive Woody is an 89-year-old male with history of dementia with history of seizure disorder and schizophrenia.  He is unable to care for himself and manage his affairs.  He requires supportive care.    Review of Systems   Constitutional:  Negative for chills and fever.   Cardiovascular:  Negative for chest pain.   All other systems reviewed and are negative.    Objective   /78   Pulse 78   Temp 36.6 °C (97.8 °F)   Resp 16     Physical Exam  Vitals reviewed.   Constitutional:       General: He is not in acute distress.     Comments: This is a well-developed, well-nourished male, sitting in a chair.   HENT:      Right Ear: Tympanic membrane, ear canal and external ear normal.      Left Ear: Tympanic membrane, ear canal and external ear normal.   Eyes:      General: No scleral icterus.     Pupils: Pupils are equal, round, and reactive to light.   Neck:      Vascular: No carotid bruit.   Cardiovascular:      Heart sounds: Normal heart sounds, S1 normal and S2 normal. No murmur heard.     No friction rub.   Pulmonary:      Effort: Pulmonary effort is normal.      Breath sounds: Normal breath sounds and air entry.   Abdominal:      Palpations: There is no hepatomegaly, splenomegaly or mass.   Musculoskeletal:         General: No swelling or deformity. Normal range of motion.      Cervical back: Neck supple.      Right lower leg: No edema.      Left lower leg: No edema.   Lymphadenopathy:      Cervical: No cervical adenopathy.      Upper Body:      Right upper body: No axillary adenopathy.      Left upper body: No axillary adenopathy.      Lower Body: No right inguinal adenopathy. No left inguinal adenopathy.   Neurological:      Cranial Nerves: Cranial nerves 2-12 are intact. No cranial nerve deficit.      Sensory: No sensory deficit.       Motor: Motor function is intact. No weakness.      Gait: Gait is intact.      Deep Tendon Reflexes: Reflexes normal.      Comments: The patient is alert and oriented x2.   Psychiatric:         Mood and Affect: Mood normal. Mood is not anxious or depressed. Affect is not angry.         Behavior: Behavior is not agitated.         Thought Content: Thought content normal.         Judgment: Judgment normal.     LAB WORK: Laboratory studies reviewed.    Assessment/Plan   Problem List Items Addressed This Visit             ICD-10-CM       Advance Directives and General Issues    At risk for falls Z91.81       Cardiac and Vasculature    Benign hypertension I10       Endocrine/Metabolic    Diabetes mellitus (CMS/[facility]) E11.9       Neuro    Dementia (CMS/HCC) - Primary F03.90     Other Visit Diagnoses         Codes    Seizure disorder (CMS/HCC)     G40.909    Schizophrenia, unspecified type (CMS/HCC)     F20.9    Benign prostatic hyperplasia, unspecified whether lower urinary tract symptoms present     N40.0    Weakness     R53.1    Hypothyroidism, unspecified type     E03.9        1. Dementia, unchanged.  2. Seizure disorder, on antiepileptic.  3. Schizophrenia, on medication.  4. Diabetes, on insulin.  5. BPH, on tamsulosin.  6. Weakness, on PT, OT.  7. Fall risk, fall precaution.  8. Hypothyroidism, on levothyroxine.  9. Hypertension, med controlled.    Scribe Attestation  By signing my name below, IChastity Scribe attest that this documentation has been prepared under the direction and in the presence of Sergio Islas MD.   All medical record entries made by the scribe were personally dictated by me I have reviewed the chart and agree the record accurately reflects my personal performance of his history physical examination and management

## 2024-02-01 ENCOUNTER — NURSING HOME VISIT (OUTPATIENT)
Dept: POST ACUTE CARE | Facility: EXTERNAL LOCATION | Age: 89
End: 2024-02-01
Payer: COMMERCIAL

## 2024-02-01 DIAGNOSIS — F20.9 SCHIZOPHRENIA, UNSPECIFIED TYPE (MULTI): ICD-10-CM

## 2024-02-01 DIAGNOSIS — I10 BENIGN HYPERTENSION: ICD-10-CM

## 2024-02-01 DIAGNOSIS — F03.90 DEMENTIA, UNSPECIFIED DEMENTIA SEVERITY, UNSPECIFIED DEMENTIA TYPE, UNSPECIFIED WHETHER BEHAVIORAL, PSYCHOTIC, OR MOOD DISTURBANCE OR ANXIETY (MULTI): Primary | ICD-10-CM

## 2024-02-01 DIAGNOSIS — Z79.4 TYPE 2 DIABETES MELLITUS WITHOUT COMPLICATION, WITH LONG-TERM CURRENT USE OF INSULIN (MULTI): ICD-10-CM

## 2024-02-01 DIAGNOSIS — G40.909 SEIZURE DISORDER (MULTI): ICD-10-CM

## 2024-02-01 DIAGNOSIS — N40.0 BENIGN PROSTATIC HYPERPLASIA, UNSPECIFIED WHETHER LOWER URINARY TRACT SYMPTOMS PRESENT: ICD-10-CM

## 2024-02-01 DIAGNOSIS — Z91.81 AT RISK FOR FALLS: ICD-10-CM

## 2024-02-01 DIAGNOSIS — E11.9 TYPE 2 DIABETES MELLITUS WITHOUT COMPLICATION, WITH LONG-TERM CURRENT USE OF INSULIN (MULTI): ICD-10-CM

## 2024-02-01 DIAGNOSIS — E03.9 HYPOTHYROIDISM, UNSPECIFIED TYPE: ICD-10-CM

## 2024-02-01 DIAGNOSIS — R53.1 WEAKNESS: ICD-10-CM

## 2024-02-01 PROCEDURE — 99309 SBSQ NF CARE MODERATE MDM 30: CPT | Performed by: INTERNAL MEDICINE

## 2024-02-01 NOTE — LETTER
Patient: Clive Woody  : 1934    Encounter Date: 2024    PLACE OF SERVICE:  [facility]    This is a subsequent visit.    Subjective  Patient ID: Clive Woody is a 89 y.o. male who presents for Follow-up.    Mr. Clive Woody is an 89-year-old male with history of dementia with schizophrenia.  He has had recent increase in agitation and will follow up with Psychiatry.    Review of Systems   Constitutional:  Negative for chills and fever.   Cardiovascular:  Negative for chest pain.   All other systems reviewed and are negative.    Objective  /76   Pulse 74   Temp 36.4 °C (97.5 °F)   Resp 16     Physical Exam  Vitals reviewed.   Constitutional:       General: He is not in acute distress.     Comments: This is a well-developed and well-nourished male, sitting in a chair.   HENT:      Right Ear: Tympanic membrane, ear canal and external ear normal.      Left Ear: Tympanic membrane, ear canal and external ear normal.   Eyes:      General: No scleral icterus.     Pupils: Pupils are equal, round, and reactive to light.   Neck:      Vascular: No carotid bruit.   Cardiovascular:      Heart sounds: Normal heart sounds, S1 normal and S2 normal. No murmur heard.     No friction rub.   Pulmonary:      Effort: Pulmonary effort is normal.      Breath sounds: Normal breath sounds and air entry.   Abdominal:      Palpations: There is no hepatomegaly, splenomegaly or mass.   Musculoskeletal:         General: No swelling or deformity. Normal range of motion.      Cervical back: Neck supple.      Right lower leg: No edema.      Left lower leg: No edema.   Lymphadenopathy:      Cervical: No cervical adenopathy.      Upper Body:      Right upper body: No axillary adenopathy.      Left upper body: No axillary adenopathy.      Lower Body: No right inguinal adenopathy. No left inguinal adenopathy.   Neurological:      Mental Status: He is oriented to person, place, and time.      Cranial Nerves:  Cranial nerves 2-12 are intact. No cranial nerve deficit.      Sensory: No sensory deficit.      Motor: Motor function is intact. No weakness.      Gait: Gait is intact.      Deep Tendon Reflexes: Reflexes normal.      Comments: The patient is aphasic.   Psychiatric:         Mood and Affect: Mood normal. Mood is not anxious or depressed. Affect is not angry.         Behavior: Behavior is not agitated.         Thought Content: Thought content normal.         Judgment: Judgment normal.     LAB WORK: Laboratory studies reviewed.    Assessment/Plan  Problem List Items Addressed This Visit             ICD-10-CM       Advance Directives and General Issues    At risk for falls Z91.81       Cardiac and Vasculature    Benign hypertension I10       Endocrine/Metabolic    Diabetes mellitus (CMS/[facility]) E11.9       Neuro    Dementia (CMS/[facility]) - Primary F03.90     Other Visit Diagnoses         Codes    Schizophrenia, unspecified type (CMS/[facility])     F20.9    Seizure disorder (CMS/[facility])     G40.909    Weakness     R53.1    Benign prostatic hyperplasia, unspecified whether lower urinary tract symptoms present     N40.0    Hypothyroidism, unspecified type     E03.9        1. Dementia with increased agitation.  The patient will follow Psychiatry.  2. Seizure disorder, on antiepileptic.  3. Schizophrenia, on medication.  4. Weakness, on PT/OT.  5. Fall risk, on fall precaution.  6. Diabetes, on insulin.  7. BPH, on tamsulosin.  8. Hypothyroidism, on levothyroxine.  9. Hypertension, med controlled.    Scribe Attestation  By signing my name below, IChastity, Scrmira attest that this documentation has been prepared under the direction and in the presence of Sergio Islas MD.       Electronically Signed By: Sergio Islas MD   2/23/24  6:48 PM

## 2024-02-12 VITALS
RESPIRATION RATE: 16 BRPM | SYSTOLIC BLOOD PRESSURE: 124 MMHG | HEART RATE: 74 BPM | DIASTOLIC BLOOD PRESSURE: 76 MMHG | TEMPERATURE: 97.5 F

## 2024-02-12 ASSESSMENT — ENCOUNTER SYMPTOMS
CHILLS: 0
FEVER: 0

## 2024-02-12 NOTE — PROGRESS NOTES
PLACE OF SERVICE:  [facility]    This is a subsequent visit.    Subjective   Patient ID: Clive Woody is a 89 y.o. male who presents for Follow-up.    Mr. Clive Woody is an 89-year-old male with history of dementia with schizophrenia.  He has had recent increase in agitation and will follow up with Psychiatry.    Review of Systems   Constitutional:  Negative for chills and fever.   Cardiovascular:  Negative for chest pain.   All other systems reviewed and are negative.    Objective   /76   Pulse 74   Temp 36.4 °C (97.5 °F)   Resp 16     Physical Exam  Vitals reviewed.   Constitutional:       General: He is not in acute distress.     Comments: This is a well-developed and well-nourished male, sitting in a chair.   HENT:      Right Ear: Tympanic membrane, ear canal and external ear normal.      Left Ear: Tympanic membrane, ear canal and external ear normal.   Eyes:      General: No scleral icterus.     Pupils: Pupils are equal, round, and reactive to light.   Neck:      Vascular: No carotid bruit.   Cardiovascular:      Heart sounds: Normal heart sounds, S1 normal and S2 normal. No murmur heard.     No friction rub.   Pulmonary:      Effort: Pulmonary effort is normal.      Breath sounds: Normal breath sounds and air entry.   Abdominal:      Palpations: There is no hepatomegaly, splenomegaly or mass.   Musculoskeletal:         General: No swelling or deformity. Normal range of motion.      Cervical back: Neck supple.      Right lower leg: No edema.      Left lower leg: No edema.   Lymphadenopathy:      Cervical: No cervical adenopathy.      Upper Body:      Right upper body: No axillary adenopathy.      Left upper body: No axillary adenopathy.      Lower Body: No right inguinal adenopathy. No left inguinal adenopathy.   Neurological:      Mental Status: He is oriented to person, place, and time.      Cranial Nerves: Cranial nerves 2-12 are intact. No cranial nerve deficit.      Sensory: No  sensory deficit.      Motor: Motor function is intact. No weakness.      Gait: Gait is intact.      Deep Tendon Reflexes: Reflexes normal.      Comments: The patient is aphasic.   Psychiatric:         Mood and Affect: Mood normal. Mood is not anxious or depressed. Affect is not angry.         Behavior: Behavior is not agitated.         Thought Content: Thought content normal.         Judgment: Judgment normal.     LAB WORK: Laboratory studies reviewed.    Assessment/Plan   Problem List Items Addressed This Visit             ICD-10-CM       Advance Directives and General Issues    At risk for falls Z91.81       Cardiac and Vasculature    Benign hypertension I10       Endocrine/Metabolic    Diabetes mellitus (CMS/[facility]) E11.9       Neuro    Dementia (CMS/[facility]) - Primary F03.90     Other Visit Diagnoses         Codes    Schizophrenia, unspecified type (CMS/[facility])     F20.9    Seizure disorder (CMS/[facility])     G40.909    Weakness     R53.1    Benign prostatic hyperplasia, unspecified whether lower urinary tract symptoms present     N40.0    Hypothyroidism, unspecified type     E03.9        1. Dementia with increased agitation.  The patient will follow Psychiatry.  2. Seizure disorder, on antiepileptic.  3. Schizophrenia, on medication.  4. Weakness, on PT/OT.  5. Fall risk, on fall precaution.  6. Diabetes, on insulin.  7. BPH, on tamsulosin.  8. Hypothyroidism, on levothyroxine.  9. Hypertension, med controlled.    Scribe Attestation  By signing my name below, Chastity JOSE, Scrmira attest that this documentation has been prepared under the direction and in the presence of Sergio Islas MD.

## 2024-03-12 ENCOUNTER — NURSING HOME VISIT (OUTPATIENT)
Dept: POST ACUTE CARE | Facility: EXTERNAL LOCATION | Age: 89
End: 2024-03-12
Payer: COMMERCIAL

## 2024-03-12 DIAGNOSIS — Z79.4 TYPE 2 DIABETES MELLITUS WITHOUT COMPLICATION, WITH LONG-TERM CURRENT USE OF INSULIN (MULTI): Primary | ICD-10-CM

## 2024-03-12 DIAGNOSIS — E56.9 VITAMIN DEFICIENCY: ICD-10-CM

## 2024-03-12 DIAGNOSIS — E11.9 TYPE 2 DIABETES MELLITUS WITHOUT COMPLICATION, WITH LONG-TERM CURRENT USE OF INSULIN (MULTI): Primary | ICD-10-CM

## 2024-03-12 DIAGNOSIS — I10 BENIGN HYPERTENSION: ICD-10-CM

## 2024-03-12 DIAGNOSIS — N40.0 BENIGN PROSTATIC HYPERPLASIA, UNSPECIFIED WHETHER LOWER URINARY TRACT SYMPTOMS PRESENT: ICD-10-CM

## 2024-03-12 DIAGNOSIS — I25.10 ASHD (ARTERIOSCLEROTIC HEART DISEASE): ICD-10-CM

## 2024-03-12 DIAGNOSIS — E03.9 HYPOTHYROIDISM, UNSPECIFIED TYPE: ICD-10-CM

## 2024-03-12 PROCEDURE — 99308 SBSQ NF CARE LOW MDM 20: CPT | Performed by: NURSE PRACTITIONER

## 2024-03-20 NOTE — PROGRESS NOTES
*Provider Impression*  Patient is a 89 year y/o male who is seen today for management of multiple medical problems  #T2DM - lantus 10units QHS, glipizide 5mg daily; tradjenta 5mg daily, metformin 1000mg BID, empagliflozin 10mg daily  #BPH - finasteride 5mg daily, tamsulosin 0.4mg QPM  #HTN / CAD - atorvastatin 5mg daily, lisinopril 10mg daily  #Vitamin D deficiency - vitamin D 2000units daily  #Hypothyroidism - levothyroxine 88mcg daily  #Dementia - mgmt per psych - risperidone, trileptal, diazepam, lorazepam  #ACP - Full Code  Follow up as needed      *Chief Complaint*  multiple medical problems     *History of Present Illness*  Pt is an 89 y/o male LTC resident of [facility] w/ PMH as below who is seen today for follow up and management of multiple medical problems.    He had another UA which was negative. He has been refusing labs, diazepam added PRN.     He is seen ambulating in the hallway. He is refusing exam. He is aphasic and unable to provide any history but he appears in NAD.     Allergies - Tuberculin  PMH - HTN, CAD, T2DM, Hypothyroidism, BPH  PSH - unobtainable  FH - unobtainable  SocHx - No EtOH, non smoker      *Review of Systems*  All other systems reviewed are negative except as noted in the HPI      *Vitals*  Date: 2/19/24 - T: 97.8 P: 74 R: 17 BP: 130/72  SpO2: 96% on RA; Date: 3/7/24 - Wt: 185     *Results/Data*  CBC - Date: 9/19/23 WBC: 4.1 HGB: 11.8 HCT: 37.3 PLT: 296 ;   BMP - Date: 9/19/23 Na: 141 K: 4.8 Cl: 105 CO2: 25 BUN: 23 Cr: 1.1 Glu: 115 Ca: 9.0 Alb: 3.7 ;   LFT - Date: 9/19/23 AST: 19 ALT: 13 ALP: 57 Tbili: 0.7 ;   Coags - Date: INR: PT:  Other - Date: 5/19/20 - TSH: 1.430, HgbA1c: 6.6%; 11/12/20 - CRP: 25.7 D-Dimer: 1.31; 10/4/21 - A1c: 12.7 TSH: 3.100 25-OH-D: 14.29; 3/29/22 - TSH: 2.69; 4/26/22 - TSH: 2.44 25-OH-D: 26.44 A1c: 9.7 ; 9/19/23 - A1c: 8.7% TSH: 3.272  25-OH-D: 33.93    *Physical Exam*  Gen: (+) NAD, (+) well-appearing  HEENT: (+) normocephalic, (+) MMM, (+)  anicteric  Neck: (-) JVD  Lungs: (-) cough, (+) unlabored respirations  Abd: (+) ND  Ext: (-) edema, (-) deformity  MSK: (-) joint swelling  Skin: (+) seb derm rash  Neuro: (+) follows commands, (-) tremor, (+) alert

## 2024-04-07 ENCOUNTER — NURSING HOME VISIT (OUTPATIENT)
Dept: POST ACUTE CARE | Facility: EXTERNAL LOCATION | Age: 89
End: 2024-04-07
Payer: COMMERCIAL

## 2024-04-07 DIAGNOSIS — E11.9 TYPE 2 DIABETES MELLITUS WITHOUT COMPLICATION, WITH LONG-TERM CURRENT USE OF INSULIN (MULTI): ICD-10-CM

## 2024-04-07 DIAGNOSIS — Z91.81 AT RISK FOR FALLS: ICD-10-CM

## 2024-04-07 DIAGNOSIS — F20.9 SCHIZOPHRENIA, UNSPECIFIED TYPE (MULTI): Primary | ICD-10-CM

## 2024-04-07 DIAGNOSIS — E03.9 HYPOTHYROIDISM, UNSPECIFIED TYPE: ICD-10-CM

## 2024-04-07 DIAGNOSIS — F03.90 DEMENTIA, UNSPECIFIED DEMENTIA SEVERITY, UNSPECIFIED DEMENTIA TYPE, UNSPECIFIED WHETHER BEHAVIORAL, PSYCHOTIC, OR MOOD DISTURBANCE OR ANXIETY (MULTI): ICD-10-CM

## 2024-04-07 DIAGNOSIS — Z79.4 TYPE 2 DIABETES MELLITUS WITHOUT COMPLICATION, WITH LONG-TERM CURRENT USE OF INSULIN (MULTI): ICD-10-CM

## 2024-04-07 DIAGNOSIS — R53.1 WEAKNESS: ICD-10-CM

## 2024-04-07 DIAGNOSIS — I10 BENIGN HYPERTENSION: ICD-10-CM

## 2024-04-07 DIAGNOSIS — N40.0 BENIGN PROSTATIC HYPERPLASIA, UNSPECIFIED WHETHER LOWER URINARY TRACT SYMPTOMS PRESENT: ICD-10-CM

## 2024-04-07 PROCEDURE — 99308 SBSQ NF CARE LOW MDM 20: CPT | Performed by: INTERNAL MEDICINE

## 2024-04-07 NOTE — LETTER
Patient: Clive Woody  : 1934    Encounter Date: 2024    PLACE OF SERVICE:  [facility].    This is a subsequent visit.    Subjective  Patient ID: Clive Woody is a 89 y.o. male who presents for Follow-up.    Mr. Clive Woody is a 89-year-old male with history of dementia.  He suffers from diabetes and schizophrenia.  He is unable to care for himself and requires supportive care.    Review of Systems   Constitutional:  Negative for chills and fever.   Cardiovascular:  Negative for chest pain.   All other systems reviewed and are negative.    Objective  /78   Pulse 76   Temp 36.6 °C (97.9 °F)   Resp 18     Physical Exam  Vitals reviewed.   Constitutional:       General: He is not in acute distress.     Comments: This is a well-developed and well-nourished male, sitting in a chair.   HENT:      Right Ear: Tympanic membrane, ear canal and external ear normal.      Left Ear: Tympanic membrane, ear canal and external ear normal.   Eyes:      General: No scleral icterus.     Pupils: Pupils are equal, round, and reactive to light.   Neck:      Vascular: No carotid bruit.   Cardiovascular:      Heart sounds: Normal heart sounds, S1 normal and S2 normal. No murmur heard.     No friction rub.   Pulmonary:      Effort: Pulmonary effort is normal.      Breath sounds: Normal breath sounds and air entry.   Abdominal:      Palpations: There is no hepatomegaly, splenomegaly or mass.   Musculoskeletal:         General: No swelling or deformity. Normal range of motion.      Cervical back: Neck supple.      Right lower leg: No edema.      Left lower leg: No edema.   Lymphadenopathy:      Cervical: No cervical adenopathy.      Upper Body:      Right upper body: No axillary adenopathy.      Left upper body: No axillary adenopathy.      Lower Body: No right inguinal adenopathy. No left inguinal adenopathy.   Neurological:      Mental Status: He is alert.      Cranial Nerves: Cranial nerves 2-12  are intact. No cranial nerve deficit.      Sensory: No sensory deficit.      Motor: Motor function is intact. No weakness.      Gait: Gait is intact.      Deep Tendon Reflexes: Reflexes normal.      Comments: The patient is oriented x2.   Psychiatric:         Mood and Affect: Mood normal. Mood is not anxious or depressed. Affect is not angry.         Behavior: Behavior is not agitated.         Thought Content: Thought content normal.         Judgment: Judgment normal.     LAB WORK:  Laboratory studies reviewed.    Assessment/Plan  Problem List Items Addressed This Visit             ICD-10-CM       Advance Directives and General Issues    At risk for falls Z91.81       Cardiac and Vasculature    Benign hypertension I10       Endocrine/Metabolic    Diabetes mellitus (CMS/[facility]) E11.9       Neuro    Dementia (CMS/[facility]) F03.90     Other Visit Diagnoses         Codes    Schizophrenia, unspecified type (CMS/[facility])    -  Primary F20.9    Hypothyroidism, unspecified type     E03.9    Weakness     R53.1    Benign prostatic hyperplasia, unspecified whether lower urinary tract symptoms present     N40.0        1. Schizophrenia, on medication.  2. Diabetes, on insulin.  3. Dementia, unchanged.  4. Hypertension, med controlled.  5. Hypothyroidism, on levothyroxine.  6. Weakness, on PT/OT.  7. Fall risk, on fall precaution.  8. BPH, on tamsulosin.    Scribe Attestation  By signing my name below, I, Joyce Junior attest that this documentation has been prepared under the direction and in the presence of Sergio Islas MD.     All medical record entries made by the scribe were personally dictated by me I have reviewed the chart and agree the record accurately reflects my personal performance of his history physical examination and management      Electronically Signed By: Sergio Islas MD   4/15/24 10:58 PM

## 2024-04-11 VITALS
RESPIRATION RATE: 18 BRPM | HEART RATE: 76 BPM | TEMPERATURE: 97.9 F | DIASTOLIC BLOOD PRESSURE: 78 MMHG | SYSTOLIC BLOOD PRESSURE: 124 MMHG

## 2024-04-11 ASSESSMENT — ENCOUNTER SYMPTOMS
CHILLS: 0
FEVER: 0

## 2024-04-11 NOTE — PROGRESS NOTES
PLACE OF SERVICE:  [facility].    This is a subsequent visit.    Subjective   Patient ID: Clive Woody is a 89 y.o. male who presents for Follow-up.    Mr. Clive Woody is a 89-year-old male with history of dementia.  He suffers from diabetes and schizophrenia.  He is unable to care for himself and requires supportive care.    Review of Systems   Constitutional:  Negative for chills and fever.   Cardiovascular:  Negative for chest pain.   All other systems reviewed and are negative.    Objective   /78   Pulse 76   Temp 36.6 °C (97.9 °F)   Resp 18     Physical Exam  Vitals reviewed.   Constitutional:       General: He is not in acute distress.     Comments: This is a well-developed and well-nourished male, sitting in a chair.   HENT:      Right Ear: Tympanic membrane, ear canal and external ear normal.      Left Ear: Tympanic membrane, ear canal and external ear normal.   Eyes:      General: No scleral icterus.     Pupils: Pupils are equal, round, and reactive to light.   Neck:      Vascular: No carotid bruit.   Cardiovascular:      Heart sounds: Normal heart sounds, S1 normal and S2 normal. No murmur heard.     No friction rub.   Pulmonary:      Effort: Pulmonary effort is normal.      Breath sounds: Normal breath sounds and air entry.   Abdominal:      Palpations: There is no hepatomegaly, splenomegaly or mass.   Musculoskeletal:         General: No swelling or deformity. Normal range of motion.      Cervical back: Neck supple.      Right lower leg: No edema.      Left lower leg: No edema.   Lymphadenopathy:      Cervical: No cervical adenopathy.      Upper Body:      Right upper body: No axillary adenopathy.      Left upper body: No axillary adenopathy.      Lower Body: No right inguinal adenopathy. No left inguinal adenopathy.   Neurological:      Mental Status: He is alert.      Cranial Nerves: Cranial nerves 2-12 are intact. No cranial nerve deficit.      Sensory: No sensory deficit.       Motor: Motor function is intact. No weakness.      Gait: Gait is intact.      Deep Tendon Reflexes: Reflexes normal.      Comments: The patient is oriented x2.   Psychiatric:         Mood and Affect: Mood normal. Mood is not anxious or depressed. Affect is not angry.         Behavior: Behavior is not agitated.         Thought Content: Thought content normal.         Judgment: Judgment normal.     LAB WORK:  Laboratory studies reviewed.    Assessment/Plan   Problem List Items Addressed This Visit             ICD-10-CM       Advance Directives and General Issues    At risk for falls Z91.81       Cardiac and Vasculature    Benign hypertension I10       Endocrine/Metabolic    Diabetes mellitus (CMS/[facility]) E11.9       Neuro    Dementia (CMS/[facility]) F03.90     Other Visit Diagnoses         Codes    Schizophrenia, unspecified type (CMS/[facility])    -  Primary F20.9    Hypothyroidism, unspecified type     E03.9    Weakness     R53.1    Benign prostatic hyperplasia, unspecified whether lower urinary tract symptoms present     N40.0        1. Schizophrenia, on medication.  2. Diabetes, on insulin.  3. Dementia, unchanged.  4. Hypertension, med controlled.  5. Hypothyroidism, on levothyroxine.  6. Weakness, on PT/OT.  7. Fall risk, on fall precaution.  8. BPH, on tamsulosin.    Scribe Attestation  By signing my name below, I, Joyce Junior attest that this documentation has been prepared under the direction and in the presence of Sergio Islas MD.     All medical record entries made by the scribe were personally dictated by me I have reviewed the chart and agree the record accurately reflects my personal performance of his history physical examination and management

## 2024-05-03 ENCOUNTER — NURSING HOME VISIT (OUTPATIENT)
Dept: POST ACUTE CARE | Facility: EXTERNAL LOCATION | Age: 89
End: 2024-05-03
Payer: COMMERCIAL

## 2024-05-03 DIAGNOSIS — F20.9 SCHIZOPHRENIA, UNSPECIFIED TYPE (MULTI): ICD-10-CM

## 2024-05-03 DIAGNOSIS — R53.1 WEAKNESS: ICD-10-CM

## 2024-05-03 DIAGNOSIS — Z79.4 TYPE 2 DIABETES MELLITUS WITHOUT COMPLICATION, WITH LONG-TERM CURRENT USE OF INSULIN (MULTI): ICD-10-CM

## 2024-05-03 DIAGNOSIS — I10 BENIGN HYPERTENSION: ICD-10-CM

## 2024-05-03 DIAGNOSIS — G40.909 SEIZURE DISORDER (MULTI): ICD-10-CM

## 2024-05-03 DIAGNOSIS — E03.9 HYPOTHYROIDISM, UNSPECIFIED TYPE: ICD-10-CM

## 2024-05-03 DIAGNOSIS — Z91.81 AT RISK FOR FALLS: ICD-10-CM

## 2024-05-03 DIAGNOSIS — N40.0 BENIGN PROSTATIC HYPERPLASIA, UNSPECIFIED WHETHER LOWER URINARY TRACT SYMPTOMS PRESENT: ICD-10-CM

## 2024-05-03 DIAGNOSIS — E11.9 TYPE 2 DIABETES MELLITUS WITHOUT COMPLICATION, WITH LONG-TERM CURRENT USE OF INSULIN (MULTI): ICD-10-CM

## 2024-05-03 DIAGNOSIS — F03.90 DEMENTIA, UNSPECIFIED DEMENTIA SEVERITY, UNSPECIFIED DEMENTIA TYPE, UNSPECIFIED WHETHER BEHAVIORAL, PSYCHOTIC, OR MOOD DISTURBANCE OR ANXIETY (MULTI): Primary | ICD-10-CM

## 2024-05-03 PROCEDURE — 99309 SBSQ NF CARE MODERATE MDM 30: CPT | Performed by: INTERNAL MEDICINE

## 2024-05-03 NOTE — LETTER
Patient: Clive Woody  : 1934    Encounter Date: 2024    PLACE OF SERVICE:  [facility].    This is a subsequent visit.    Subjective  Patient ID: Clive Woody is a 89 y.o. male who presents for Follow-up.    Mr. Clive Woody is a 89-year-old male with history of dementia.  He suffers from schizophrenia.  He is unable to care for himself.  He requires supportive care.    Review of Systems   Constitutional:  Negative for chills and fever.   Cardiovascular:  Negative for chest pain.   All other systems reviewed and are negative.    Objective  /76   Pulse 74   Temp 36.7 °C (98 °F)   Resp 16     Physical Exam  Vitals reviewed.   Constitutional:       General: He is not in acute distress.     Comments: This is a well-developed, well-nourished male, sitting in a chair.   HENT:      Right Ear: Tympanic membrane, ear canal and external ear normal.      Left Ear: Tympanic membrane, ear canal and external ear normal.   Eyes:      General: No scleral icterus.     Pupils: Pupils are equal, round, and reactive to light.   Neck:      Vascular: No carotid bruit.   Cardiovascular:      Heart sounds: Normal heart sounds, S1 normal and S2 normal. No murmur heard.     No friction rub.   Pulmonary:      Effort: Pulmonary effort is normal.      Breath sounds: Normal breath sounds and air entry.   Abdominal:      Palpations: There is no hepatomegaly, splenomegaly or mass.   Musculoskeletal:         General: No swelling or deformity. Normal range of motion.      Cervical back: Neck supple.      Right lower leg: No edema.      Left lower leg: No edema.   Lymphadenopathy:      Cervical: No cervical adenopathy.      Upper Body:      Right upper body: No axillary adenopathy.      Left upper body: No axillary adenopathy.      Lower Body: No right inguinal adenopathy. No left inguinal adenopathy.   Neurological:      Mental Status: He is alert.      Cranial Nerves: Cranial nerves 2-12 are intact. No  cranial nerve deficit.      Sensory: No sensory deficit.      Motor: Motor function is intact. No weakness.      Gait: Gait is intact.      Deep Tendon Reflexes: Reflexes normal.      Comments: The patient is oriented x2.   Psychiatric:         Mood and Affect: Mood normal. Mood is not anxious or depressed. Affect is not angry.         Behavior: Behavior is not agitated.         Thought Content: Thought content normal.         Judgment: Judgment normal.     LAB WORK:  Laboratory studies were reviewed.    Assessment/Plan  Problem List Items Addressed This Visit             ICD-10-CM       Advance Directives and General Issues    At risk for falls Z91.81       Cardiac and Vasculature    Benign hypertension I10       Endocrine/Metabolic    Diabetes mellitus (Multi) E11.9       Neuro    Dementia (Multi) - Primary F03.90     Other Visit Diagnoses         Codes    Schizophrenia, unspecified type (Multi)     F20.9    Benign prostatic hyperplasia, unspecified whether lower urinary tract symptoms present     N40.0    Weakness     R53.1    Seizure disorder (Multi)     G40.909    Hypothyroidism, unspecified type     E03.9        1. Dementia, unchanged.  2. Schizophrenia, on medication.  3. BPH, on tamsulosin.  4. Diabetes, on insulin.  5. Weakness, on PT/OT.  6. Fall risk, on fall precautions.  7. Seizure disorder, on antiepileptic.  8. Hypertension, medically controlled.  9. Hypothyroidism, on levothyroxine.    Scribe Attestation  By signing my name below, I, Joyce Junior attest that this documentation has been prepared under the direction and in the presence of Sergio Islas MD.     All medical record entries made by the scribe were personally dictated by me I have reviewed the chart and agree the record accurately reflects my personal performance of his history physical examination and management      Electronically Signed By: Sergio Islas MD   5/7/24  3:52 PM

## 2024-05-07 VITALS
TEMPERATURE: 98 F | SYSTOLIC BLOOD PRESSURE: 124 MMHG | HEART RATE: 74 BPM | DIASTOLIC BLOOD PRESSURE: 76 MMHG | RESPIRATION RATE: 16 BRPM

## 2024-05-07 ASSESSMENT — ENCOUNTER SYMPTOMS
FEVER: 0
CHILLS: 0

## 2024-05-07 NOTE — PROGRESS NOTES
PLACE OF SERVICE:  [facility]    This is a subsequent visit.    Subjective   Patient ID: Clive Woody is a 89 y.o. male who presents for Follow-up.    Mr. Clive Woody is a 89-year-old male with history of dementia.  He suffers from schizophrenia.  He is unable to care for himself.  He requires supportive care.    Review of Systems   Constitutional:  Negative for chills and fever.   Cardiovascular:  Negative for chest pain.   All other systems reviewed and are negative.    Objective   /76   Pulse 74   Temp 36.7 °C (98 °F)   Resp 16     Physical Exam  Vitals reviewed.   Constitutional:       General: He is not in acute distress.     Comments: This is a well-developed, well-nourished male, sitting in a chair.   HENT:      Right Ear: Tympanic membrane, ear canal and external ear normal.      Left Ear: Tympanic membrane, ear canal and external ear normal.   Eyes:      General: No scleral icterus.     Pupils: Pupils are equal, round, and reactive to light.   Neck:      Vascular: No carotid bruit.   Cardiovascular:      Heart sounds: Normal heart sounds, S1 normal and S2 normal. No murmur heard.     No friction rub.   Pulmonary:      Effort: Pulmonary effort is normal.      Breath sounds: Normal breath sounds and air entry.   Abdominal:      Palpations: There is no hepatomegaly, splenomegaly or mass.   Musculoskeletal:         General: No swelling or deformity. Normal range of motion.      Cervical back: Neck supple.      Right lower leg: No edema.      Left lower leg: No edema.   Lymphadenopathy:      Cervical: No cervical adenopathy.      Upper Body:      Right upper body: No axillary adenopathy.      Left upper body: No axillary adenopathy.      Lower Body: No right inguinal adenopathy. No left inguinal adenopathy.   Neurological:      Mental Status: He is alert.      Cranial Nerves: Cranial nerves 2-12 are intact. No cranial nerve deficit.      Sensory: No sensory deficit.      Motor: Motor  function is intact. No weakness.      Gait: Gait is intact.      Deep Tendon Reflexes: Reflexes normal.      Comments: The patient is oriented x2.   Psychiatric:         Mood and Affect: Mood normal. Mood is not anxious or depressed. Affect is not angry.         Behavior: Behavior is not agitated.         Thought Content: Thought content normal.         Judgment: Judgment normal.     LAB WORK:  Laboratory studies were reviewed.    Assessment/Plan   Problem List Items Addressed This Visit             ICD-10-CM       Advance Directives and General Issues    At risk for falls Z91.81       Cardiac and Vasculature    Benign hypertension I10       Endocrine/Metabolic    Diabetes mellitus (Multi) E11.9       Neuro    Dementia (Multi) - Primary F03.90     Other Visit Diagnoses         Codes    Schizophrenia, unspecified type (Multi)     F20.9    Benign prostatic hyperplasia, unspecified whether lower urinary tract symptoms present     N40.0    Weakness     R53.1    Seizure disorder (Multi)     G40.909    Hypothyroidism, unspecified type     E03.9        1. Dementia, unchanged.  2. Schizophrenia, on medication.  3. BPH, on tamsulosin.  4. Diabetes, on insulin.  5. Weakness, on PT/OT.  6. Fall risk, on fall precautions.  7. Seizure disorder, on antiepileptic.  8. Hypertension, medically controlled.  9. Hypothyroidism, on levothyroxine.    Scribe Attestation  By signing my name below, I, Joyce Junior attest that this documentation has been prepared under the direction and in the presence of Sergio Islas MD.     All medical record entries made by the scribe were personally dictated by me I have reviewed the chart and agree the record accurately reflects my personal performance of his history physical examination and management

## 2024-06-11 ENCOUNTER — NURSING HOME VISIT (OUTPATIENT)
Dept: POST ACUTE CARE | Facility: EXTERNAL LOCATION | Age: 89
End: 2024-06-11
Payer: COMMERCIAL

## 2024-06-11 DIAGNOSIS — E11.9 TYPE 2 DIABETES MELLITUS WITHOUT COMPLICATION, WITH LONG-TERM CURRENT USE OF INSULIN (MULTI): Primary | ICD-10-CM

## 2024-06-11 DIAGNOSIS — I10 BENIGN HYPERTENSION: ICD-10-CM

## 2024-06-11 DIAGNOSIS — E56.9 VITAMIN DEFICIENCY: ICD-10-CM

## 2024-06-11 DIAGNOSIS — Z79.4 TYPE 2 DIABETES MELLITUS WITHOUT COMPLICATION, WITH LONG-TERM CURRENT USE OF INSULIN (MULTI): Primary | ICD-10-CM

## 2024-06-11 DIAGNOSIS — N40.0 BENIGN PROSTATIC HYPERPLASIA, UNSPECIFIED WHETHER LOWER URINARY TRACT SYMPTOMS PRESENT: ICD-10-CM

## 2024-06-11 DIAGNOSIS — E03.9 HYPOTHYROIDISM, UNSPECIFIED TYPE: ICD-10-CM

## 2024-06-11 DIAGNOSIS — I25.10 ASHD (ARTERIOSCLEROTIC HEART DISEASE): ICD-10-CM

## 2024-06-11 PROCEDURE — 99308 SBSQ NF CARE LOW MDM 20: CPT | Performed by: NURSE PRACTITIONER

## 2024-06-17 NOTE — PROGRESS NOTES
*Provider Impression*  Patient is a 89 year y/o male who is seen today for management of multiple medical problems  #T2DM - lantus 10units QHS, glipizide 5mg daily; tradjenta 5mg daily, metformin 1000mg BID, empagliflozin 10mg daily  #BPH - finasteride 5mg daily, tamsulosin 0.4mg QPM  #HTN / CAD - atorvastatin 5mg daily, lisinopril 10mg daily  #Vitamin D deficiency - vitamin D 2000units daily  #Hypothyroidism - levothyroxine 88mcg daily  #Dementia - mgmt per psych - risperidone, trileptal, diazepam, lorazepam, buspar  #ACP - Full Code  Follow up as needed      *Chief Complaint*  multiple medical problems     *History of Present Illness*  Pt is an 89 y/o male LTC resident of [facility] w/ PMH as below who is seen today for follow up and management of multiple medical problems.    Buspar added, perseris adjusted by psych.     He is seen ambulating in the hallway. He is aphasic and unable to provide any history but he appears in NAD.     Allergies - Tuberculin  PMH - HTN, CAD, T2DM, Hypothyroidism, BPH  PSH - unobtainable  FH - unobtainable  SocHx - No EtOH, non smoker      *Review of Systems*  All other systems reviewed are negative except as noted in the HPI      *Vitals*  Date: 4/15/24 - T: 97.6 P: 79 R: 16 BP: 138/70  SpO2: 97% on RA; Date: 6/4/24 - Wt: 187     *Results/Data*  CBC - Date: 9/19/23 WBC: 4.1 HGB: 11.8 HCT: 37.3 PLT: 296 ;   BMP - Date: 9/19/23 Na: 141 K: 4.8 Cl: 105 CO2: 25 BUN: 23 Cr: 1.1 Glu: 115 Ca: 9.0 Alb: 3.7 ;   LFT - Date: 9/19/23 AST: 19 ALT: 13 ALP: 57 Tbili: 0.7 ;   Coags - Date: INR: PT:  Other - Date: 5/19/20 - TSH: 1.430, HgbA1c: 6.6%; 11/12/20 - CRP: 25.7 D-Dimer: 1.31; 10/4/21 - A1c: 12.7 TSH: 3.100 25-OH-D: 14.29; 3/29/22 - TSH: 2.69; 4/26/22 - TSH: 2.44 25-OH-D: 26.44 A1c: 9.7 ; 9/19/23 - A1c: 8.7% TSH: 3.272  25-OH-D: 33.93    *Physical Exam*  Gen: (+) NAD, (+) well-appearing  HEENT: (+) normocephalic, (+) MMM, (+) anicteric  Neck: (-) JVD  Lungs: (-) cough, (+) unlabored  respirations  Abd: (+) ND  Ext: (-) edema, (-) deformity  MSK: (-) joint swelling  Skin: (+) seb derm rash  Neuro: (+) follows commands, (-) tremor, (+) alert

## 2024-07-13 ENCOUNTER — NURSING HOME VISIT (OUTPATIENT)
Dept: POST ACUTE CARE | Facility: EXTERNAL LOCATION | Age: 89
End: 2024-07-13
Payer: COMMERCIAL

## 2024-07-13 DIAGNOSIS — I10 BENIGN HYPERTENSION: ICD-10-CM

## 2024-07-13 DIAGNOSIS — F03.90 DEMENTIA, UNSPECIFIED DEMENTIA SEVERITY, UNSPECIFIED DEMENTIA TYPE, UNSPECIFIED WHETHER BEHAVIORAL, PSYCHOTIC, OR MOOD DISTURBANCE OR ANXIETY (MULTI): Primary | ICD-10-CM

## 2024-07-13 DIAGNOSIS — E11.9 TYPE 2 DIABETES MELLITUS WITHOUT COMPLICATION, WITH LONG-TERM CURRENT USE OF INSULIN (MULTI): ICD-10-CM

## 2024-07-13 DIAGNOSIS — N40.0 BENIGN PROSTATIC HYPERPLASIA, UNSPECIFIED WHETHER LOWER URINARY TRACT SYMPTOMS PRESENT: ICD-10-CM

## 2024-07-13 DIAGNOSIS — Z91.81 AT RISK FOR FALLS: ICD-10-CM

## 2024-07-13 DIAGNOSIS — I25.10 ASHD (ARTERIOSCLEROTIC HEART DISEASE): ICD-10-CM

## 2024-07-13 DIAGNOSIS — E03.9 HYPOTHYROIDISM, UNSPECIFIED TYPE: ICD-10-CM

## 2024-07-13 DIAGNOSIS — Z79.4 TYPE 2 DIABETES MELLITUS WITHOUT COMPLICATION, WITH LONG-TERM CURRENT USE OF INSULIN (MULTI): ICD-10-CM

## 2024-07-13 DIAGNOSIS — G40.909 SEIZURE DISORDER (MULTI): ICD-10-CM

## 2024-07-13 DIAGNOSIS — R53.1 WEAKNESS: ICD-10-CM

## 2024-07-13 DIAGNOSIS — F20.9 SCHIZOPHRENIA, UNSPECIFIED TYPE (MULTI): ICD-10-CM

## 2024-07-13 PROCEDURE — 99309 SBSQ NF CARE MODERATE MDM 30: CPT | Performed by: INTERNAL MEDICINE

## 2024-07-13 NOTE — LETTER
Patient: Clive Woody  : 1934    Encounter Date: 2024    PLACE OF SERVICE:  [facility]    This is a subsequent visit.    Subjective  Patient ID: Clive Woody is a 89 y.o. male who presents for Follow-up.    Mr. Clive Woody is a 89-year-old male with history of dementia with schizophrenia.  He is aphasic and unable to care for himself.  He requires supportive care.    Review of Systems   Constitutional:  Negative for chills and fever.   Cardiovascular:  Negative for chest pain.   All other systems reviewed and are negative.    Objective  /82   Pulse 76   Temp 36.5 °C (97.7 °F)   Resp 16     Physical Exam  Vitals reviewed.   Constitutional:       General: He is not in acute distress.     Comments: This is a well-developed, well-nourished male, sitting in a chair.   HENT:      Right Ear: Tympanic membrane, ear canal and external ear normal.      Left Ear: Tympanic membrane, ear canal and external ear normal.   Eyes:      General: No scleral icterus.     Pupils: Pupils are equal, round, and reactive to light.   Neck:      Vascular: No carotid bruit.   Cardiovascular:      Heart sounds: Normal heart sounds, S1 normal and S2 normal. No murmur heard.     No friction rub.   Pulmonary:      Effort: Pulmonary effort is normal.      Breath sounds: Normal breath sounds and air entry.   Abdominal:      Palpations: There is no hepatomegaly, splenomegaly or mass.   Musculoskeletal:         General: No swelling or deformity. Normal range of motion.      Cervical back: Neck supple.      Right lower leg: No edema.      Left lower leg: No edema.   Lymphadenopathy:      Cervical: No cervical adenopathy.      Upper Body:      Right upper body: No axillary adenopathy.      Left upper body: No axillary adenopathy.      Lower Body: No right inguinal adenopathy. No left inguinal adenopathy.   Neurological:      Mental Status: He is alert.      Cranial Nerves: Cranial nerves 2-12 are intact. No  cranial nerve deficit.      Sensory: No sensory deficit.      Motor: Motor function is intact. No weakness.      Gait: Gait is intact.      Deep Tendon Reflexes: Reflexes normal.      Comments: The patient is oriented x2.   Psychiatric:         Mood and Affect: Mood normal. Mood is not anxious or depressed. Affect is not angry.         Behavior: Behavior is not agitated.         Thought Content: Thought content normal.         Judgment: Judgment normal.     LAB WORK:  Laboratory studies reviewed.    Assessment/Plan  Problem List Items Addressed This Visit             ICD-10-CM       Advance Directives and General Issues    At risk for falls Z91.81       Cardiac and Vasculature    Benign hypertension I10    ASHD (arteriosclerotic heart disease) I25.10       Endocrine/Metabolic    Diabetes mellitus (Multi) E11.9       Neuro    Dementia (Multi) - Primary F03.90     Other Visit Diagnoses         Codes    Schizophrenia, unspecified type (Multi)     F20.9    Weakness     R53.1    Benign prostatic hyperplasia, unspecified whether lower urinary tract symptoms present     N40.0    Seizure disorder (Multi)     G40.909    Hypothyroidism, unspecified type     E03.9        1. Dementia, unchanged.  2. Schizophrenia, on medication.  3. Diabetes, on insulin.  4. Weakness, on PT/OT.  5. Fall risk, on fall precautions.  6. BPH, on tamsulosin.  7. Seizure disorder, on antiepileptic.  8. ASHD, on aspirin.  9. Hypertension, med controlled.  10. Hypothyroidism, on levothyroxine.    Scribe Attestation  By signing my name below, IChastity Scribe attest that this documentation has been prepared under the direction and in the presence of Sergio Islas MD.    All medical record entries made by the scribe were personally dictated by me I have reviewed the chart and agree the record accurately reflects my personal performance of his history physical examination and management      Electronically Signed By: Sergio Islas MD   7/24/24  12:27 AM

## 2024-07-17 VITALS
SYSTOLIC BLOOD PRESSURE: 128 MMHG | DIASTOLIC BLOOD PRESSURE: 82 MMHG | RESPIRATION RATE: 16 BRPM | TEMPERATURE: 97.7 F | HEART RATE: 76 BPM

## 2024-07-17 ASSESSMENT — ENCOUNTER SYMPTOMS
FEVER: 0
CHILLS: 0

## 2024-07-17 NOTE — PROGRESS NOTES
PLACE OF SERVICE:  [facility]    This is a subsequent visit.    Subjective   Patient ID: Clive Woody is a 89 y.o. male who presents for Follow-up.    Mr. Clive Woody is a 89-year-old male with history of dementia with schizophrenia.  He is aphasic and unable to care for himself.  He requires supportive care.    Review of Systems   Constitutional:  Negative for chills and fever.   Cardiovascular:  Negative for chest pain.   All other systems reviewed and are negative.    Objective   /82   Pulse 76   Temp 36.5 °C (97.7 °F)   Resp 16     Physical Exam  Vitals reviewed.   Constitutional:       General: He is not in acute distress.     Comments: This is a well-developed, well-nourished male, sitting in a chair.   HENT:      Right Ear: Tympanic membrane, ear canal and external ear normal.      Left Ear: Tympanic membrane, ear canal and external ear normal.   Eyes:      General: No scleral icterus.     Pupils: Pupils are equal, round, and reactive to light.   Neck:      Vascular: No carotid bruit.   Cardiovascular:      Heart sounds: Normal heart sounds, S1 normal and S2 normal. No murmur heard.     No friction rub.   Pulmonary:      Effort: Pulmonary effort is normal.      Breath sounds: Normal breath sounds and air entry.   Abdominal:      Palpations: There is no hepatomegaly, splenomegaly or mass.   Musculoskeletal:         General: No swelling or deformity. Normal range of motion.      Cervical back: Neck supple.      Right lower leg: No edema.      Left lower leg: No edema.   Lymphadenopathy:      Cervical: No cervical adenopathy.      Upper Body:      Right upper body: No axillary adenopathy.      Left upper body: No axillary adenopathy.      Lower Body: No right inguinal adenopathy. No left inguinal adenopathy.   Neurological:      Mental Status: He is alert.      Cranial Nerves: Cranial nerves 2-12 are intact. No cranial nerve deficit.      Sensory: No sensory deficit.      Motor: Motor  function is intact. No weakness.      Gait: Gait is intact.      Deep Tendon Reflexes: Reflexes normal.      Comments: The patient is oriented x2.   Psychiatric:         Mood and Affect: Mood normal. Mood is not anxious or depressed. Affect is not angry.         Behavior: Behavior is not agitated.         Thought Content: Thought content normal.         Judgment: Judgment normal.     LAB WORK:  Laboratory studies reviewed.    Assessment/Plan   Problem List Items Addressed This Visit             ICD-10-CM       Advance Directives and General Issues    At risk for falls Z91.81       Cardiac and Vasculature    Benign hypertension I10    ASHD (arteriosclerotic heart disease) I25.10       Endocrine/Metabolic    Diabetes mellitus (Multi) E11.9       Neuro    Dementia (Multi) - Primary F03.90     Other Visit Diagnoses         Codes    Schizophrenia, unspecified type (Multi)     F20.9    Weakness     R53.1    Benign prostatic hyperplasia, unspecified whether lower urinary tract symptoms present     N40.0    Seizure disorder (Multi)     G40.909    Hypothyroidism, unspecified type     E03.9        1. Dementia, unchanged.  2. Schizophrenia, on medication.  3. Diabetes, on insulin.  4. Weakness, on PT/OT.  5. Fall risk, on fall precautions.  6. BPH, on tamsulosin.  7. Seizure disorder, on antiepileptic.  8. ASHD, on aspirin.  9. Hypertension, med controlled.  10. Hypothyroidism, on levothyroxine.    Scribe Attestation  By signing my name below, IChastity Scribe attest that this documentation has been prepared under the direction and in the presence of eSrgio Islas MD.    All medical record entries made by the scribe were personally dictated by me I have reviewed the chart and agree the record accurately reflects my personal performance of his history physical examination and management

## 2024-08-04 ENCOUNTER — NURSING HOME VISIT (OUTPATIENT)
Dept: POST ACUTE CARE | Facility: EXTERNAL LOCATION | Age: 89
End: 2024-08-04
Payer: COMMERCIAL

## 2024-08-04 DIAGNOSIS — R53.1 WEAKNESS: ICD-10-CM

## 2024-08-04 DIAGNOSIS — F03.90 DEMENTIA, UNSPECIFIED DEMENTIA SEVERITY, UNSPECIFIED DEMENTIA TYPE, UNSPECIFIED WHETHER BEHAVIORAL, PSYCHOTIC, OR MOOD DISTURBANCE OR ANXIETY (MULTI): Primary | ICD-10-CM

## 2024-08-04 DIAGNOSIS — I10 BENIGN HYPERTENSION: ICD-10-CM

## 2024-08-04 DIAGNOSIS — G40.909 SEIZURE DISORDER (MULTI): ICD-10-CM

## 2024-08-04 DIAGNOSIS — E11.9 TYPE 2 DIABETES MELLITUS WITHOUT COMPLICATION, WITH LONG-TERM CURRENT USE OF INSULIN (MULTI): ICD-10-CM

## 2024-08-04 DIAGNOSIS — E03.9 HYPOTHYROIDISM, UNSPECIFIED TYPE: ICD-10-CM

## 2024-08-04 DIAGNOSIS — N40.0 BENIGN PROSTATIC HYPERPLASIA, UNSPECIFIED WHETHER LOWER URINARY TRACT SYMPTOMS PRESENT: ICD-10-CM

## 2024-08-04 DIAGNOSIS — I25.10 ASHD (ARTERIOSCLEROTIC HEART DISEASE): ICD-10-CM

## 2024-08-04 DIAGNOSIS — Z91.81 AT RISK FOR FALLS: ICD-10-CM

## 2024-08-04 DIAGNOSIS — Z79.4 TYPE 2 DIABETES MELLITUS WITHOUT COMPLICATION, WITH LONG-TERM CURRENT USE OF INSULIN (MULTI): ICD-10-CM

## 2024-08-04 DIAGNOSIS — F20.9 SCHIZOPHRENIA, UNSPECIFIED TYPE (MULTI): ICD-10-CM

## 2024-08-04 PROCEDURE — 99309 SBSQ NF CARE MODERATE MDM 30: CPT | Performed by: INTERNAL MEDICINE

## 2024-08-04 NOTE — LETTER
Patient: Clive Woody  : 1934    Encounter Date: 2024    PLACE OF SERVICE:  [facility]    This is a subsequent visit.    Subjective  Patient ID: Clive Woody is a 89 y.o. male who presents for Follow-up.    Mr. Clive Woody is a 89-year-old male with history of dementia with schizophrenia and diabetes.  He is unable to care for himself and requires supportive care.    Review of Systems   Constitutional:  Negative for chills and fever.   Cardiovascular:  Negative for chest pain.   All other systems reviewed and are negative.    Objective  /80   Pulse 76   Temp 36.6 °C (97.9 °F)   Resp 16     Physical Exam  Vitals reviewed.   Constitutional:       General: He is not in acute distress.     Comments: This is a well-developed, well-nourished male, sitting in a chair   HENT:      Right Ear: Tympanic membrane, ear canal and external ear normal.      Left Ear: Tympanic membrane, ear canal and external ear normal.   Eyes:      General: No scleral icterus.     Pupils: Pupils are equal, round, and reactive to light.   Neck:      Vascular: No carotid bruit.   Cardiovascular:      Heart sounds: Normal heart sounds, S1 normal and S2 normal. No murmur heard.     No friction rub.   Pulmonary:      Effort: Pulmonary effort is normal.      Breath sounds: Normal breath sounds and air entry.   Abdominal:      Palpations: There is no hepatomegaly, splenomegaly or mass.   Musculoskeletal:         General: No swelling or deformity. Normal range of motion.      Cervical back: Neck supple.      Right lower leg: No edema.      Left lower leg: No edema.   Lymphadenopathy:      Cervical: No cervical adenopathy.      Upper Body:      Right upper body: No axillary adenopathy.      Left upper body: No axillary adenopathy.      Lower Body: No right inguinal adenopathy. No left inguinal adenopathy.   Neurological:      Cranial Nerves: Cranial nerves 2-12 are intact. No cranial nerve deficit.       Sensory: No sensory deficit.      Motor: Motor function is intact. No weakness.      Gait: Gait is intact.      Deep Tendon Reflexes: Reflexes normal.      Comments: The patient is alert and oriented x2.   Psychiatric:         Mood and Affect: Mood normal. Mood is not anxious or depressed. Affect is not angry.         Behavior: Behavior is not agitated.         Thought Content: Thought content normal.         Judgment: Judgment normal.     LAB WORK:  Laboratory studies reviewed.    Assessment/Plan  Problem List Items Addressed This Visit             ICD-10-CM       Advance Directives and General Issues    At risk for falls Z91.81       Cardiac and Vasculature    Benign hypertension I10    ASHD (arteriosclerotic heart disease) I25.10       Endocrine/Metabolic    Diabetes mellitus (Multi) E11.9       Neuro    Dementia (Multi) - Primary F03.90     Other Visit Diagnoses         Codes    Schizophrenia, unspecified type (Multi)     F20.9    Benign prostatic hyperplasia, unspecified whether lower urinary tract symptoms present     N40.0    Weakness     R53.1    Seizure disorder (Multi)     G40.909    Hypothyroidism, unspecified type     E03.9        1. Dementia, unchanged.  2. Schizophrenia, on medication.  3. Diabetes, on insulin.  4. BPH, on tamsulosin.  5. Weakness, on PT/OT.  6. Fall risk, on fall precautions.  7. Seizure disorder, on antiepileptic.  8. ASHD, on aspirin.  9. Hypothyroidism, on levothyroxine.  10. Hypertension, med controlled.    Scribe Attestation  By signing my name below, IChastity Scribe attest that this documentation has been prepared under the direction and in the presence of Sergio Islas MD.     All medical record entries made by the scribe were personally dictated by me I have reviewed the chart and agree the record accurately reflects my personal performance of his history physical examination and management      Electronically Signed By: Sergio Islas MD   8/9/24 11:46 PM

## 2024-08-06 VITALS
TEMPERATURE: 97.9 F | RESPIRATION RATE: 16 BRPM | DIASTOLIC BLOOD PRESSURE: 80 MMHG | SYSTOLIC BLOOD PRESSURE: 126 MMHG | HEART RATE: 76 BPM

## 2024-08-06 ASSESSMENT — ENCOUNTER SYMPTOMS
FEVER: 0
CHILLS: 0

## 2024-08-06 NOTE — PROGRESS NOTES
PLACE OF SERVICE:  [facility]    This is a subsequent visit.    Subjective   Patient ID: Clive Woody is a 89 y.o. male who presents for Follow-up.    Mr. Clive Woody is a 89-year-old male with history of dementia with schizophrenia and diabetes.  He is unable to care for himself and requires supportive care.    Review of Systems   Constitutional:  Negative for chills and fever.   Cardiovascular:  Negative for chest pain.   All other systems reviewed and are negative.    Objective   /80   Pulse 76   Temp 36.6 °C (97.9 °F)   Resp 16     Physical Exam  Vitals reviewed.   Constitutional:       General: He is not in acute distress.     Comments: This is a well-developed, well-nourished male, sitting in a chair   HENT:      Right Ear: Tympanic membrane, ear canal and external ear normal.      Left Ear: Tympanic membrane, ear canal and external ear normal.   Eyes:      General: No scleral icterus.     Pupils: Pupils are equal, round, and reactive to light.   Neck:      Vascular: No carotid bruit.   Cardiovascular:      Heart sounds: Normal heart sounds, S1 normal and S2 normal. No murmur heard.     No friction rub.   Pulmonary:      Effort: Pulmonary effort is normal.      Breath sounds: Normal breath sounds and air entry.   Abdominal:      Palpations: There is no hepatomegaly, splenomegaly or mass.   Musculoskeletal:         General: No swelling or deformity. Normal range of motion.      Cervical back: Neck supple.      Right lower leg: No edema.      Left lower leg: No edema.   Lymphadenopathy:      Cervical: No cervical adenopathy.      Upper Body:      Right upper body: No axillary adenopathy.      Left upper body: No axillary adenopathy.      Lower Body: No right inguinal adenopathy. No left inguinal adenopathy.   Neurological:      Cranial Nerves: Cranial nerves 2-12 are intact. No cranial nerve deficit.      Sensory: No sensory deficit.      Motor: Motor function is intact. No weakness.       Gait: Gait is intact.      Deep Tendon Reflexes: Reflexes normal.      Comments: The patient is alert and oriented x2.   Psychiatric:         Mood and Affect: Mood normal. Mood is not anxious or depressed. Affect is not angry.         Behavior: Behavior is not agitated.         Thought Content: Thought content normal.         Judgment: Judgment normal.     LAB WORK:  Laboratory studies reviewed.    Assessment/Plan   Problem List Items Addressed This Visit             ICD-10-CM       Advance Directives and General Issues    At risk for falls Z91.81       Cardiac and Vasculature    Benign hypertension I10    ASHD (arteriosclerotic heart disease) I25.10       Endocrine/Metabolic    Diabetes mellitus (Multi) E11.9       Neuro    Dementia (Multi) - Primary F03.90     Other Visit Diagnoses         Codes    Schizophrenia, unspecified type (Multi)     F20.9    Benign prostatic hyperplasia, unspecified whether lower urinary tract symptoms present     N40.0    Weakness     R53.1    Seizure disorder (Multi)     G40.909    Hypothyroidism, unspecified type     E03.9        1. Dementia, unchanged.  2. Schizophrenia, on medication.  3. Diabetes, on insulin.  4. BPH, on tamsulosin.  5. Weakness, on PT/OT.  6. Fall risk, on fall precautions.  7. Seizure disorder, on antiepileptic.  8. ASHD, on aspirin.  9. Hypothyroidism, on levothyroxine.  10. Hypertension, med controlled.    Scribe Attestation  By signing my name below, IChastity Scribe attest that this documentation has been prepared under the direction and in the presence of Sergio Islas MD.     All medical record entries made by the scribe were personally dictated by me I have reviewed the chart and agree the record accurately reflects my personal performance of his history physical examination and management

## 2024-09-04 ENCOUNTER — NURSING HOME VISIT (OUTPATIENT)
Dept: POST ACUTE CARE | Facility: EXTERNAL LOCATION | Age: 89
End: 2024-09-04
Payer: COMMERCIAL

## 2024-09-04 DIAGNOSIS — E11.9 TYPE 2 DIABETES MELLITUS WITHOUT COMPLICATION, WITH LONG-TERM CURRENT USE OF INSULIN (MULTI): ICD-10-CM

## 2024-09-04 DIAGNOSIS — F20.9 SCHIZOPHRENIA, UNSPECIFIED TYPE (MULTI): ICD-10-CM

## 2024-09-04 DIAGNOSIS — Z79.4 TYPE 2 DIABETES MELLITUS WITHOUT COMPLICATION, WITH LONG-TERM CURRENT USE OF INSULIN (MULTI): ICD-10-CM

## 2024-09-04 DIAGNOSIS — N40.0 BENIGN PROSTATIC HYPERPLASIA, UNSPECIFIED WHETHER LOWER URINARY TRACT SYMPTOMS PRESENT: ICD-10-CM

## 2024-09-04 DIAGNOSIS — E03.9 HYPOTHYROIDISM, UNSPECIFIED TYPE: ICD-10-CM

## 2024-09-04 DIAGNOSIS — R53.1 WEAKNESS: ICD-10-CM

## 2024-09-04 DIAGNOSIS — G40.909 SEIZURE DISORDER (MULTI): ICD-10-CM

## 2024-09-04 DIAGNOSIS — Z91.81 AT RISK FOR FALLS: ICD-10-CM

## 2024-09-04 DIAGNOSIS — I10 BENIGN HYPERTENSION: ICD-10-CM

## 2024-09-04 DIAGNOSIS — F03.90 DEMENTIA, UNSPECIFIED DEMENTIA SEVERITY, UNSPECIFIED DEMENTIA TYPE, UNSPECIFIED WHETHER BEHAVIORAL, PSYCHOTIC, OR MOOD DISTURBANCE OR ANXIETY (MULTI): Primary | ICD-10-CM

## 2024-09-04 DIAGNOSIS — I25.10 ASHD (ARTERIOSCLEROTIC HEART DISEASE): ICD-10-CM

## 2024-09-04 PROCEDURE — 99309 SBSQ NF CARE MODERATE MDM 30: CPT | Performed by: INTERNAL MEDICINE

## 2024-09-04 NOTE — LETTER
Patient: Clive Woody  : 1934    Encounter Date: 2024    PLACE OF SERVICE:  [facility]    This is a subsequent visit.    Subjective  Patient ID: Clive Woody is a 89 y.o. male who presents for Follow-up.    Mr. Clive Woody is a 89-year-old male with history of dementia with diabetes and schizophrenia.  He is unable to care for himself and requires supportive care.    Review of Systems   Constitutional:  Negative for chills and fever.   Cardiovascular:  Negative for chest pain.   All other systems reviewed and are negative.    Objective  /78   Pulse 74   Temp 36.5 °C (97.7 °F)   Resp 16     Physical Exam  Vitals reviewed.   Constitutional:       General: He is not in acute distress.     Comments: This is a well-developed, well-nourished male, sitting in a chair   HENT:      Right Ear: Tympanic membrane, ear canal and external ear normal.      Left Ear: Tympanic membrane, ear canal and external ear normal.   Eyes:      General: No scleral icterus.     Pupils: Pupils are equal, round, and reactive to light.   Neck:      Vascular: No carotid bruit.   Cardiovascular:      Heart sounds: Normal heart sounds, S1 normal and S2 normal. No murmur heard.     No friction rub.   Pulmonary:      Effort: Pulmonary effort is normal.      Breath sounds: Normal breath sounds and air entry.   Abdominal:      Palpations: There is no hepatomegaly, splenomegaly or mass.   Musculoskeletal:         General: No swelling or deformity. Normal range of motion.      Cervical back: Neck supple.      Right lower leg: No edema.      Left lower leg: No edema.   Lymphadenopathy:      Cervical: No cervical adenopathy.      Upper Body:      Right upper body: No axillary adenopathy.      Left upper body: No axillary adenopathy.      Lower Body: No right inguinal adenopathy. No left inguinal adenopathy.   Neurological:      Cranial Nerves: Cranial nerves 2-12 are intact. No cranial nerve deficit.       Sensory: No sensory deficit.      Motor: Motor function is intact. No weakness.      Gait: Gait is intact.      Deep Tendon Reflexes: Reflexes normal.      Comments: The patient is alert and oriented x2.   Psychiatric:         Mood and Affect: Mood normal. Mood is not anxious or depressed. Affect is not angry.         Behavior: Behavior is not agitated.         Thought Content: Thought content normal.         Judgment: Judgment normal.     LAB WORK:  Laboratory studies were reviewed.    Assessment/Plan  Problem List Items Addressed This Visit             ICD-10-CM       Advance Directives and General Issues    At risk for falls Z91.81       Cardiac and Vasculature    Benign hypertension I10    ASHD (arteriosclerotic heart disease) I25.10       Endocrine/Metabolic    Diabetes mellitus (Multi) E11.9       Neuro    Dementia (Multi) - Primary F03.90     Other Visit Diagnoses         Codes    Schizophrenia, unspecified type (Multi)     F20.9    Benign prostatic hyperplasia, unspecified whether lower urinary tract symptoms present     N40.0    Hypothyroidism, unspecified type     E03.9    Seizure disorder (Multi)     G40.909    Weakness     R53.1        1. Dementia, unchanged.  2. Diabetes, on insulin.  3. Schizophrenia, on medication.  4. BPH, on tamsulosin.  5. Hypertension, medically controlled.  6. Hypothyroidism, on levothyroxine.  7. ASHD, on aspirin.  8. Seizure disorder, on antiepileptics.  9. Weakness, on PT/OT.  10. Fall risk, on fall precautions.    Scribe Attestation  By signing my name below, IChastity Scribe attest that this documentation has been prepared under the direction and in the presence of Sergio Islas MD.       All medical record entries made by the scribe were personally dictated by me I have reviewed the chart and agree the record accurately reflects my personal performance of his history physical examination and management      Electronically Signed By: Sergio Islas MD   9/14/24  10:50 PM

## 2024-09-09 VITALS
HEART RATE: 74 BPM | SYSTOLIC BLOOD PRESSURE: 126 MMHG | TEMPERATURE: 97.7 F | DIASTOLIC BLOOD PRESSURE: 78 MMHG | RESPIRATION RATE: 16 BRPM

## 2024-09-09 ASSESSMENT — ENCOUNTER SYMPTOMS
FEVER: 0
CHILLS: 0

## 2024-09-09 NOTE — PROGRESS NOTES
PLACE OF SERVICE:  [facility]    This is a subsequent visit.    Subjective   Patient ID: Clive Woody is a 89 y.o. male who presents for Follow-up.    Mr. Clive Woody is a 89-year-old male with history of dementia with diabetes and schizophrenia.  He is unable to care for himself and requires supportive care.    Review of Systems   Constitutional:  Negative for chills and fever.   Cardiovascular:  Negative for chest pain.   All other systems reviewed and are negative.    Objective   /78   Pulse 74   Temp 36.5 °C (97.7 °F)   Resp 16     Physical Exam  Vitals reviewed.   Constitutional:       General: He is not in acute distress.     Comments: This is a well-developed, well-nourished male, sitting in a chair   HENT:      Right Ear: Tympanic membrane, ear canal and external ear normal.      Left Ear: Tympanic membrane, ear canal and external ear normal.   Eyes:      General: No scleral icterus.     Pupils: Pupils are equal, round, and reactive to light.   Neck:      Vascular: No carotid bruit.   Cardiovascular:      Heart sounds: Normal heart sounds, S1 normal and S2 normal. No murmur heard.     No friction rub.   Pulmonary:      Effort: Pulmonary effort is normal.      Breath sounds: Normal breath sounds and air entry.   Abdominal:      Palpations: There is no hepatomegaly, splenomegaly or mass.   Musculoskeletal:         General: No swelling or deformity. Normal range of motion.      Cervical back: Neck supple.      Right lower leg: No edema.      Left lower leg: No edema.   Lymphadenopathy:      Cervical: No cervical adenopathy.      Upper Body:      Right upper body: No axillary adenopathy.      Left upper body: No axillary adenopathy.      Lower Body: No right inguinal adenopathy. No left inguinal adenopathy.   Neurological:      Cranial Nerves: Cranial nerves 2-12 are intact. No cranial nerve deficit.      Sensory: No sensory deficit.      Motor: Motor function is intact. No weakness.       Gait: Gait is intact.      Deep Tendon Reflexes: Reflexes normal.      Comments: The patient is alert and oriented x2.   Psychiatric:         Mood and Affect: Mood normal. Mood is not anxious or depressed. Affect is not angry.         Behavior: Behavior is not agitated.         Thought Content: Thought content normal.         Judgment: Judgment normal.     LAB WORK:  Laboratory studies were reviewed.    Assessment/Plan   Problem List Items Addressed This Visit             ICD-10-CM       Advance Directives and General Issues    At risk for falls Z91.81       Cardiac and Vasculature    Benign hypertension I10    ASHD (arteriosclerotic heart disease) I25.10       Endocrine/Metabolic    Diabetes mellitus (Multi) E11.9       Neuro    Dementia (Multi) - Primary F03.90     Other Visit Diagnoses         Codes    Schizophrenia, unspecified type (Multi)     F20.9    Benign prostatic hyperplasia, unspecified whether lower urinary tract symptoms present     N40.0    Hypothyroidism, unspecified type     E03.9    Seizure disorder (Multi)     G40.909    Weakness     R53.1        1. Dementia, unchanged.  2. Diabetes, on insulin.  3. Schizophrenia, on medication.  4. BPH, on tamsulosin.  5. Hypertension, medically controlled.  6. Hypothyroidism, on levothyroxine.  7. ASHD, on aspirin.  8. Seizure disorder, on antiepileptics.  9. Weakness, on PT/OT.  10. Fall risk, on fall precautions.    Scribe Attestation  By signing my name below, IChastity Scribe attest that this documentation has been prepared under the direction and in the presence of Sergio Islas MD.       All medical record entries made by the scribe were personally dictated by me I have reviewed the chart and agree the record accurately reflects my personal performance of his history physical examination and management

## 2024-09-29 ENCOUNTER — NURSING HOME VISIT (OUTPATIENT)
Dept: POST ACUTE CARE | Facility: EXTERNAL LOCATION | Age: 89
End: 2024-09-29
Payer: COMMERCIAL

## 2024-09-29 DIAGNOSIS — N40.0 BENIGN PROSTATIC HYPERPLASIA, UNSPECIFIED WHETHER LOWER URINARY TRACT SYMPTOMS PRESENT: ICD-10-CM

## 2024-09-29 DIAGNOSIS — Z91.81 AT RISK FOR FALLS: ICD-10-CM

## 2024-09-29 DIAGNOSIS — E03.9 HYPOTHYROIDISM, UNSPECIFIED TYPE: ICD-10-CM

## 2024-09-29 DIAGNOSIS — E11.9 TYPE 2 DIABETES MELLITUS WITHOUT COMPLICATION, WITH LONG-TERM CURRENT USE OF INSULIN (MULTI): ICD-10-CM

## 2024-09-29 DIAGNOSIS — Z79.4 TYPE 2 DIABETES MELLITUS WITHOUT COMPLICATION, WITH LONG-TERM CURRENT USE OF INSULIN (MULTI): ICD-10-CM

## 2024-09-29 DIAGNOSIS — G40.909 SEIZURE DISORDER (MULTI): ICD-10-CM

## 2024-09-29 DIAGNOSIS — F20.9 SCHIZOPHRENIA, UNSPECIFIED TYPE: ICD-10-CM

## 2024-09-29 DIAGNOSIS — L02.91 ABSCESS: Primary | ICD-10-CM

## 2024-09-29 DIAGNOSIS — I25.10 ASHD (ARTERIOSCLEROTIC HEART DISEASE): ICD-10-CM

## 2024-09-29 DIAGNOSIS — R53.1 WEAKNESS: ICD-10-CM

## 2024-09-29 DIAGNOSIS — I10 BENIGN HYPERTENSION: ICD-10-CM

## 2024-09-29 DIAGNOSIS — F03.90 DEMENTIA, UNSPECIFIED DEMENTIA SEVERITY, UNSPECIFIED DEMENTIA TYPE, UNSPECIFIED WHETHER BEHAVIORAL, PSYCHOTIC, OR MOOD DISTURBANCE OR ANXIETY (MULTI): ICD-10-CM

## 2024-09-29 PROCEDURE — 99310 SBSQ NF CARE HIGH MDM 45: CPT | Performed by: INTERNAL MEDICINE

## 2024-10-02 ENCOUNTER — APPOINTMENT (OUTPATIENT)
Dept: SURGERY | Facility: CLINIC | Age: 89
End: 2024-10-02
Payer: COMMERCIAL

## 2024-10-10 ENCOUNTER — NURSING HOME VISIT (OUTPATIENT)
Dept: POST ACUTE CARE | Facility: EXTERNAL LOCATION | Age: 89
End: 2024-10-10
Payer: COMMERCIAL

## 2024-10-10 DIAGNOSIS — E56.9 VITAMIN DEFICIENCY: ICD-10-CM

## 2024-10-10 DIAGNOSIS — F03.90 DEMENTIA, UNSPECIFIED DEMENTIA SEVERITY, UNSPECIFIED DEMENTIA TYPE, UNSPECIFIED WHETHER BEHAVIORAL, PSYCHOTIC, OR MOOD DISTURBANCE OR ANXIETY (MULTI): ICD-10-CM

## 2024-10-10 DIAGNOSIS — I10 BENIGN HYPERTENSION: ICD-10-CM

## 2024-10-10 DIAGNOSIS — L02.818 CUTANEOUS ABSCESS OF OTHER SITE: Primary | ICD-10-CM

## 2024-10-10 DIAGNOSIS — E03.9 HYPOTHYROIDISM, UNSPECIFIED TYPE: ICD-10-CM

## 2024-10-10 DIAGNOSIS — Z79.4 TYPE 2 DIABETES MELLITUS WITHOUT COMPLICATION, WITH LONG-TERM CURRENT USE OF INSULIN (MULTI): ICD-10-CM

## 2024-10-10 DIAGNOSIS — N40.0 BENIGN PROSTATIC HYPERPLASIA, UNSPECIFIED WHETHER LOWER URINARY TRACT SYMPTOMS PRESENT: ICD-10-CM

## 2024-10-10 DIAGNOSIS — E11.9 TYPE 2 DIABETES MELLITUS WITHOUT COMPLICATION, WITH LONG-TERM CURRENT USE OF INSULIN (MULTI): ICD-10-CM

## 2024-10-10 DIAGNOSIS — I25.10 ASHD (ARTERIOSCLEROTIC HEART DISEASE): ICD-10-CM

## 2024-10-10 PROCEDURE — 99309 SBSQ NF CARE MODERATE MDM 30: CPT | Performed by: NURSE PRACTITIONER

## 2024-10-14 NOTE — PROGRESS NOTES
"*Provider Impression*  Patient is a 89 year y/o male who is seen today for management of multiple medical problems  #Subcutaneous abscess - doxycycline 100mg BID until 10/22, gentamicin 0.1% TID until 10/15, schedule f/u w/ general surgery Dr. Wolf for mass biopsy  #T2DM - lantus 10units QHS, glipizide 5mg daily; tradjenta 5mg daily, metformin 1000mg BID, empagliflozin 10mg daily  #BPH - finasteride 5mg daily, tamsulosin 0.4mg QPM  #HTN / CAD - atorvastatin 5mg daily, lisinopril 10mg daily  #Vitamin D deficiency - vitamin D 2000units daily  #Hypothyroidism - levothyroxine 88mcg daily  #Dementia - mgmt per psych - risperidone, trileptal, diazepam, lorazepam, buspar  #ACP - Full Code  Follow up as needed      *Chief Complaint*  multiple medical problems     *History of Present Illness*  Pt is an 89 y/o male LTC resident of [facility] w/ PMH as below who is seen today for follow up and management of multiple medical problems.    He was found to have a mass on his scalp and another mass in his abdomen. He was started on doxycycline and gentamicin for his scalp mass. Abd US ordered and reported \"No discrete sonographic abnormality\" although I am unable to view the report. Labs ordered for 10/8 appreciated as below.    He is seen ambulating in the hallway. Appears in NAD.    Allergies - Tuberculin  PMH - HTN, CAD, T2DM, Hypothyroidism, BPH  PSH - unobtainable  FH - unobtainable  SocHx - No EtOH, non smoker      *Review of Systems*  All other systems reviewed are negative except as noted in the HPI      *Vitals*  Date: 10/5/24 - T: 97.9 P: 82 R: 18 BP: 137/83  SpO2: 97% on RA; Date: 10/3/24 - Wt: 184     *Results/Data*  CBC - Date: 10/8/24 WBC: 7.0 HGB: 11.7 HCT: 37.1 PLT: 360 ;   BMP - Date: 10/8/24 Na: 137 K: 4.5 Cl: 104 CO2: 21 BUN: 21 Cr: 1.0 Glu: 252 Ca: 8.8 Alb: 3.3 ;   LFT - Date: 10/8/23 AST: 13 ALT: 9 ALP: 75 Tbili: 0.5 ;   Coags - Date: INR: PT:  Other - Date: 5/19/20 - TSH: 1.430, HgbA1c: 6.6%; 11/12/20 " - CRP: 25.7 D-Dimer: 1.31; 10/4/21 - A1c: 12.7 TSH: 3.100 25-OH-D: 14.29; 3/29/22 - TSH: 2.69; 4/26/22 - TSH: 2.44 25-OH-D: 26.44 A1c: 9.7 ; 9/19/23 - A1c: 8.7% TSH: 3.272  25-OH-D: 33.93 ; 10/8/24  TSH: 0.895    *Physical Exam*  Gen: (+) NAD, (+) well-appearing  HEENT: (+) normocephalic, (+) MMM, (+) anicteric  Neck: (-) JVD  Lungs: (-) cough, (+) unlabored respirations  Abd: (+) ND  Ext: (-) edema, (-) deformity  MSK: (-) joint swelling  Skin: (+) seb derm rash  Neuro: (+) follows commands, (-) tremor, (+) alert

## 2024-10-31 VITALS
TEMPERATURE: 98.2 F | RESPIRATION RATE: 16 BRPM | DIASTOLIC BLOOD PRESSURE: 78 MMHG | SYSTOLIC BLOOD PRESSURE: 128 MMHG | HEART RATE: 76 BPM

## 2024-10-31 ASSESSMENT — ENCOUNTER SYMPTOMS
CHILLS: 0
FEVER: 0

## 2024-11-02 ENCOUNTER — NURSING HOME VISIT (OUTPATIENT)
Dept: POST ACUTE CARE | Facility: EXTERNAL LOCATION | Age: 89
End: 2024-11-02
Payer: COMMERCIAL

## 2024-11-02 DIAGNOSIS — N40.0 BENIGN PROSTATIC HYPERPLASIA, UNSPECIFIED WHETHER LOWER URINARY TRACT SYMPTOMS PRESENT: ICD-10-CM

## 2024-11-02 DIAGNOSIS — F03.90 DEMENTIA, UNSPECIFIED DEMENTIA SEVERITY, UNSPECIFIED DEMENTIA TYPE, UNSPECIFIED WHETHER BEHAVIORAL, PSYCHOTIC, OR MOOD DISTURBANCE OR ANXIETY (MULTI): ICD-10-CM

## 2024-11-02 DIAGNOSIS — Z91.81 AT RISK FOR FALLS: ICD-10-CM

## 2024-11-02 DIAGNOSIS — R53.1 WEAKNESS: ICD-10-CM

## 2024-11-02 DIAGNOSIS — I25.10 ASHD (ARTERIOSCLEROTIC HEART DISEASE): ICD-10-CM

## 2024-11-02 DIAGNOSIS — F20.9 SCHIZOPHRENIA, UNSPECIFIED TYPE: Primary | ICD-10-CM

## 2024-11-02 DIAGNOSIS — E11.9 TYPE 2 DIABETES MELLITUS WITHOUT COMPLICATION, WITH LONG-TERM CURRENT USE OF INSULIN (MULTI): ICD-10-CM

## 2024-11-02 DIAGNOSIS — Z79.4 TYPE 2 DIABETES MELLITUS WITHOUT COMPLICATION, WITH LONG-TERM CURRENT USE OF INSULIN (MULTI): ICD-10-CM

## 2024-11-02 DIAGNOSIS — E03.9 HYPOTHYROIDISM, UNSPECIFIED TYPE: ICD-10-CM

## 2024-11-02 DIAGNOSIS — I10 BENIGN HYPERTENSION: ICD-10-CM

## 2024-11-02 DIAGNOSIS — G40.909 SEIZURE DISORDER (MULTI): ICD-10-CM

## 2024-11-02 PROCEDURE — 99309 SBSQ NF CARE MODERATE MDM 30: CPT | Performed by: INTERNAL MEDICINE

## 2024-11-02 NOTE — LETTER
Patient: Clive Woody  : 1934    Encounter Date: 2024    PLACE OF SERVICE:  [facility]    This is a subsequent visit.    Subjective  Patient ID: Clive Woody is a 89 y.o. male who presents for Follow-up.    Mr. Clive Woody is a 89-year-old male with history of dementia with schizophrenia.  He is unable to care for himself and requires supportive care.    Review of Systems   Constitutional:  Negative for chills and fever.   Cardiovascular:  Negative for chest pain.   All other systems reviewed and are negative.    Objective  /78   Pulse 76   Temp 36.4 °C (97.6 °F)   Resp 16     Physical Exam  Vitals reviewed.   Constitutional:       General: He is not in acute distress.     Comments: This is a well-developed, well-nourished male, sitting in a chair   HENT:      Right Ear: Tympanic membrane, ear canal and external ear normal.      Left Ear: Tympanic membrane, ear canal and external ear normal.   Eyes:      General: No scleral icterus.     Pupils: Pupils are equal, round, and reactive to light.   Neck:      Vascular: No carotid bruit.   Cardiovascular:      Heart sounds: Normal heart sounds, S1 normal and S2 normal. No murmur heard.     No friction rub.   Pulmonary:      Effort: Pulmonary effort is normal.      Breath sounds: Normal breath sounds and air entry.   Abdominal:      Palpations: There is no hepatomegaly, splenomegaly or mass.   Musculoskeletal:         General: No swelling or deformity. Normal range of motion.      Cervical back: Neck supple.      Right lower leg: No edema.      Left lower leg: No edema.   Lymphadenopathy:      Cervical: No cervical adenopathy.      Upper Body:      Right upper body: No axillary adenopathy.      Left upper body: No axillary adenopathy.      Lower Body: No right inguinal adenopathy. No left inguinal adenopathy.   Neurological:      Mental Status: He is alert.      Cranial Nerves: Cranial nerves 2-12 are intact. No cranial nerve  deficit.      Sensory: No sensory deficit.      Motor: Motor function is intact. No weakness.      Gait: Gait is intact.      Deep Tendon Reflexes: Reflexes normal.      Comments: The patient is oriented x2.   Psychiatric:         Mood and Affect: Mood normal. Mood is not anxious or depressed. Affect is not angry.         Behavior: Behavior is not agitated.         Thought Content: Thought content normal.         Judgment: Judgment normal.     LAB WORK:  Laboratory studies were reviewed.    Assessment/Plan  Problem List Items Addressed This Visit             ICD-10-CM       Advance Directives and General Issues    At risk for falls Z91.81       Cardiac and Vasculature    Benign hypertension I10    ASHD (arteriosclerotic heart disease) I25.10       Endocrine/Metabolic    Diabetes mellitus (Multi) E11.9       Neuro    Dementia F03.90     Other Visit Diagnoses         Codes    Schizophrenia, unspecified type    -  Primary F20.9    Benign prostatic hyperplasia, unspecified whether lower urinary tract symptoms present     N40.0    Seizure disorder (Multi)     G40.909    Hypothyroidism, unspecified type     E03.9    Weakness     R53.1        1. Schizophrenia, on medication.  2. Dementia, unchanged.  3. BPH, on tamulosin.  4. Diabetes, on insulin.  5. Seizure disorder, on anti-epileptic.  6. ASHD, on aspirin.  7. Hypothyroidism, on levothyroxine.  8. Hypertension, med controlled.  9. Weakness, on PT/OT.  10. Fall risk, on fall precautions.    Scribe Attestation  By signing my name below, IChastity Scribe attest that this documentation has been prepared under the direction and in the presence of Sergio Islas MD.     All medical record entries made by the scribe were personally dictated by me I have reviewed the chart and agree the record accurately reflects my personal performance of his history physical examination and management      Electronically Signed By: Sergio Islas MD   11/10/24 11:25 PM

## 2024-11-07 VITALS
SYSTOLIC BLOOD PRESSURE: 124 MMHG | HEART RATE: 76 BPM | RESPIRATION RATE: 16 BRPM | DIASTOLIC BLOOD PRESSURE: 78 MMHG | TEMPERATURE: 97.6 F

## 2024-11-07 ASSESSMENT — ENCOUNTER SYMPTOMS
FEVER: 0
CHILLS: 0

## 2024-11-07 NOTE — PROGRESS NOTES
PLACE OF SERVICE:  [facility]    This is a subsequent visit.    Subjective   Patient ID: Clive Woody is a 89 y.o. male who presents for Follow-up.    Mr. Clive Woody is a 89-year-old male with history of dementia with schizophrenia.  He is unable to care for himself and requires supportive care.    Review of Systems   Constitutional:  Negative for chills and fever.   Cardiovascular:  Negative for chest pain.   All other systems reviewed and are negative.    Objective   /78   Pulse 76   Temp 36.4 °C (97.6 °F)   Resp 16     Physical Exam  Vitals reviewed.   Constitutional:       General: He is not in acute distress.     Comments: This is a well-developed, well-nourished male, sitting in a chair   HENT:      Right Ear: Tympanic membrane, ear canal and external ear normal.      Left Ear: Tympanic membrane, ear canal and external ear normal.   Eyes:      General: No scleral icterus.     Pupils: Pupils are equal, round, and reactive to light.   Neck:      Vascular: No carotid bruit.   Cardiovascular:      Heart sounds: Normal heart sounds, S1 normal and S2 normal. No murmur heard.     No friction rub.   Pulmonary:      Effort: Pulmonary effort is normal.      Breath sounds: Normal breath sounds and air entry.   Abdominal:      Palpations: There is no hepatomegaly, splenomegaly or mass.   Musculoskeletal:         General: No swelling or deformity. Normal range of motion.      Cervical back: Neck supple.      Right lower leg: No edema.      Left lower leg: No edema.   Lymphadenopathy:      Cervical: No cervical adenopathy.      Upper Body:      Right upper body: No axillary adenopathy.      Left upper body: No axillary adenopathy.      Lower Body: No right inguinal adenopathy. No left inguinal adenopathy.   Neurological:      Mental Status: He is alert.      Cranial Nerves: Cranial nerves 2-12 are intact. No cranial nerve deficit.      Sensory: No sensory deficit.      Motor: Motor function is  intact. No weakness.      Gait: Gait is intact.      Deep Tendon Reflexes: Reflexes normal.      Comments: The patient is oriented x2.   Psychiatric:         Mood and Affect: Mood normal. Mood is not anxious or depressed. Affect is not angry.         Behavior: Behavior is not agitated.         Thought Content: Thought content normal.         Judgment: Judgment normal.     LAB WORK:  Laboratory studies were reviewed.    Assessment/Plan   Problem List Items Addressed This Visit             ICD-10-CM       Advance Directives and General Issues    At risk for falls Z91.81       Cardiac and Vasculature    Benign hypertension I10    ASHD (arteriosclerotic heart disease) I25.10       Endocrine/Metabolic    Diabetes mellitus (Multi) E11.9       Neuro    Dementia F03.90     Other Visit Diagnoses         Codes    Schizophrenia, unspecified type    -  Primary F20.9    Benign prostatic hyperplasia, unspecified whether lower urinary tract symptoms present     N40.0    Seizure disorder (Multi)     G40.909    Hypothyroidism, unspecified type     E03.9    Weakness     R53.1        1. Schizophrenia, on medication.  2. Dementia, unchanged.  3. BPH, on tamulosin.  4. Diabetes, on insulin.  5. Seizure disorder, on anti-epileptic.  6. ASHD, on aspirin.  7. Hypothyroidism, on levothyroxine.  8. Hypertension, med controlled.  9. Weakness, on PT/OT.  10. Fall risk, on fall precautions.    Scribe Attestation  By signing my name below, IChastity Scribe attest that this documentation has been prepared under the direction and in the presence of Sergio Islas MD.     All medical record entries made by the scribe were personally dictated by me I have reviewed the chart and agree the record accurately reflects my personal performance of his history physical examination and management

## 2024-12-14 ENCOUNTER — NURSING HOME VISIT (OUTPATIENT)
Dept: POST ACUTE CARE | Facility: EXTERNAL LOCATION | Age: 89
End: 2024-12-14
Payer: COMMERCIAL

## 2024-12-14 DIAGNOSIS — F03.90 DEMENTIA, UNSPECIFIED DEMENTIA SEVERITY, UNSPECIFIED DEMENTIA TYPE, UNSPECIFIED WHETHER BEHAVIORAL, PSYCHOTIC, OR MOOD DISTURBANCE OR ANXIETY (MULTI): Primary | ICD-10-CM

## 2024-12-14 DIAGNOSIS — I10 BENIGN HYPERTENSION: ICD-10-CM

## 2024-12-14 DIAGNOSIS — I25.10 ASHD (ARTERIOSCLEROTIC HEART DISEASE): ICD-10-CM

## 2024-12-14 DIAGNOSIS — Z91.81 AT RISK FOR FALLS: ICD-10-CM

## 2024-12-14 DIAGNOSIS — Z79.4 TYPE 2 DIABETES MELLITUS WITHOUT COMPLICATION, WITH LONG-TERM CURRENT USE OF INSULIN (MULTI): ICD-10-CM

## 2024-12-14 DIAGNOSIS — N40.0 BENIGN PROSTATIC HYPERPLASIA, UNSPECIFIED WHETHER LOWER URINARY TRACT SYMPTOMS PRESENT: ICD-10-CM

## 2024-12-14 DIAGNOSIS — G40.909 SEIZURE DISORDER (MULTI): ICD-10-CM

## 2024-12-14 DIAGNOSIS — F20.9 SCHIZOPHRENIA, UNSPECIFIED TYPE: ICD-10-CM

## 2024-12-14 DIAGNOSIS — R53.1 WEAKNESS: ICD-10-CM

## 2024-12-14 DIAGNOSIS — E11.9 TYPE 2 DIABETES MELLITUS WITHOUT COMPLICATION, WITH LONG-TERM CURRENT USE OF INSULIN (MULTI): ICD-10-CM

## 2024-12-14 DIAGNOSIS — E03.9 HYPOTHYROIDISM, UNSPECIFIED TYPE: ICD-10-CM

## 2024-12-14 PROCEDURE — 99308 SBSQ NF CARE LOW MDM 20: CPT | Performed by: INTERNAL MEDICINE

## 2024-12-14 NOTE — LETTER
Patient: Clive Woody  : 1934    Encounter Date: 2024    PLACE OF SERVICE:  [facility].    This is a subsequent visit.    Subjective  Patient ID: Clive Woody is a 89 y.o. male who presents for Follow-up.    Mr. Clive Woody is a 89-year-old male with history of diabetes.  He suffers from schizophrenia as well as dementia.  He is unable to care for himself and requires supportive care.    Review of Systems   Constitutional:  Negative for chills and fever.   Cardiovascular:  Negative for chest pain.   All other systems reviewed and are negative.    Objective  /80   Pulse 76   Temp 36.5 °C (97.7 °F)   Resp 16     Physical Exam  Vitals reviewed.   Constitutional:       General: He is not in acute distress.     Comments: This is a well-developed and well-nourished male, sitting in a chair.   HENT:      Right Ear: Tympanic membrane, ear canal and external ear normal.      Left Ear: Tympanic membrane, ear canal and external ear normal.   Eyes:      General: No scleral icterus.     Pupils: Pupils are equal, round, and reactive to light.   Neck:      Vascular: No carotid bruit.   Cardiovascular:      Heart sounds: Normal heart sounds, S1 normal and S2 normal. No murmur heard.     No friction rub.   Pulmonary:      Effort: Pulmonary effort is normal.      Breath sounds: Normal breath sounds and air entry.   Abdominal:      Palpations: There is no hepatomegaly, splenomegaly or mass.   Musculoskeletal:         General: No swelling or deformity. Normal range of motion.      Cervical back: Neck supple.      Right lower leg: No edema.      Left lower leg: No edema.   Lymphadenopathy:      Cervical: No cervical adenopathy.      Upper Body:      Right upper body: No axillary adenopathy.      Left upper body: No axillary adenopathy.      Lower Body: No right inguinal adenopathy. No left inguinal adenopathy.   Neurological:      Mental Status: He is alert.      Cranial Nerves: Cranial  nerves 2-12 are intact. No cranial nerve deficit.      Sensory: No sensory deficit.      Motor: Motor function is intact. No weakness.      Gait: Gait is intact.      Deep Tendon Reflexes: Reflexes normal.      Comments: The patient is oriented x2.   Psychiatric:         Mood and Affect: Mood normal. Mood is not anxious or depressed. Affect is not angry.         Behavior: Behavior is not agitated.         Thought Content: Thought content normal.         Judgment: Judgment normal.     LAB WORK: Laboratory studies were reviewed.    Assessment/Plan  Problem List Items Addressed This Visit             ICD-10-CM       Advance Directives and General Issues    At risk for falls Z91.81       Cardiac and Vasculature    Benign hypertension I10    ASHD (arteriosclerotic heart disease) I25.10       Endocrine/Metabolic    Diabetes mellitus (Multi) E11.9       Neuro    Dementia - Primary F03.90     Other Visit Diagnoses         Codes    Schizophrenia, unspecified type     F20.9    Benign prostatic hyperplasia, unspecified whether lower urinary tract symptoms present     N40.0    Hypothyroidism, unspecified type     E03.9    Seizure disorder (Multi)     G40.909    Weakness     R53.1        1. Dementia, unchanged.  2. Schizophrenia, on medication.  3. Diabetes, on insulin.  4. BPH, on tamulosin.  5. Hypothyroidism, on levothyroxine.  6. Hypertension, med controlled.  7. ASHD, on aspirin.  8. Seizure disorder, on anti-epileptic.  9. Weakness, on PT/OT.  10. Fall risk, on fall precautions.    Scribe Attestation  By signing my name below, ISofia Scribe attest that this documentation has been prepared under the direction and in the presence of Sergio Islas MD.     All medical record entries made by the scribe were personally dictated by me I have reviewed the chart and agree the record accurately reflects my personal performance of his history physical examination and management      Electronically Signed By: Sergio  MD Roshan   12/19/24  9:30 PM

## 2024-12-16 VITALS
SYSTOLIC BLOOD PRESSURE: 124 MMHG | RESPIRATION RATE: 16 BRPM | HEART RATE: 76 BPM | DIASTOLIC BLOOD PRESSURE: 80 MMHG | TEMPERATURE: 97.7 F

## 2024-12-16 ASSESSMENT — ENCOUNTER SYMPTOMS
CHILLS: 0
FEVER: 0

## 2024-12-16 NOTE — PROGRESS NOTES
PLACE OF SERVICE:  [facility].    This is a subsequent visit.    Subjective   Patient ID: Clive Woody is a 89 y.o. male who presents for Follow-up.    Mr. Clive Woody is a 89-year-old male with history of diabetes.  He suffers from schizophrenia as well as dementia.  He is unable to care for himself and requires supportive care.    Review of Systems   Constitutional:  Negative for chills and fever.   Cardiovascular:  Negative for chest pain.   All other systems reviewed and are negative.    Objective   /80   Pulse 76   Temp 36.5 °C (97.7 °F)   Resp 16     Physical Exam  Vitals reviewed.   Constitutional:       General: He is not in acute distress.     Comments: This is a well-developed and well-nourished male, sitting in a chair.   HENT:      Right Ear: Tympanic membrane, ear canal and external ear normal.      Left Ear: Tympanic membrane, ear canal and external ear normal.   Eyes:      General: No scleral icterus.     Pupils: Pupils are equal, round, and reactive to light.   Neck:      Vascular: No carotid bruit.   Cardiovascular:      Heart sounds: Normal heart sounds, S1 normal and S2 normal. No murmur heard.     No friction rub.   Pulmonary:      Effort: Pulmonary effort is normal.      Breath sounds: Normal breath sounds and air entry.   Abdominal:      Palpations: There is no hepatomegaly, splenomegaly or mass.   Musculoskeletal:         General: No swelling or deformity. Normal range of motion.      Cervical back: Neck supple.      Right lower leg: No edema.      Left lower leg: No edema.   Lymphadenopathy:      Cervical: No cervical adenopathy.      Upper Body:      Right upper body: No axillary adenopathy.      Left upper body: No axillary adenopathy.      Lower Body: No right inguinal adenopathy. No left inguinal adenopathy.   Neurological:      Mental Status: He is alert.      Cranial Nerves: Cranial nerves 2-12 are intact. No cranial nerve deficit.      Sensory: No sensory  deficit.      Motor: Motor function is intact. No weakness.      Gait: Gait is intact.      Deep Tendon Reflexes: Reflexes normal.      Comments: The patient is oriented x2.   Psychiatric:         Mood and Affect: Mood normal. Mood is not anxious or depressed. Affect is not angry.         Behavior: Behavior is not agitated.         Thought Content: Thought content normal.         Judgment: Judgment normal.     LAB WORK: Laboratory studies were reviewed.    Assessment/Plan   Problem List Items Addressed This Visit             ICD-10-CM       Advance Directives and General Issues    At risk for falls Z91.81       Cardiac and Vasculature    Benign hypertension I10    ASHD (arteriosclerotic heart disease) I25.10       Endocrine/Metabolic    Diabetes mellitus (Multi) E11.9       Neuro    Dementia - Primary F03.90     Other Visit Diagnoses         Codes    Schizophrenia, unspecified type     F20.9    Benign prostatic hyperplasia, unspecified whether lower urinary tract symptoms present     N40.0    Hypothyroidism, unspecified type     E03.9    Seizure disorder (Multi)     G40.909    Weakness     R53.1        1. Dementia, unchanged.  2. Schizophrenia, on medication.  3. Diabetes, on insulin.  4. BPH, on tamulosin.  5. Hypothyroidism, on levothyroxine.  6. Hypertension, med controlled.  7. ASHD, on aspirin.  8. Seizure disorder, on anti-epileptic.  9. Weakness, on PT/OT.  10. Fall risk, on fall precautions.    Scribe Attestation  By signing my name below, ISofia Scribe attest that this documentation has been prepared under the direction and in the presence of Sergio Islas MD.     All medical record entries made by the scribe were personally dictated by me I have reviewed the chart and agree the record accurately reflects my personal performance of his history physical examination and management     45 [id number] 35 35 35 35 [phone] 45

## 2025-01-09 ENCOUNTER — NURSING HOME VISIT (OUTPATIENT)
Dept: POST ACUTE CARE | Facility: EXTERNAL LOCATION | Age: OVER 89
End: 2025-01-09
Payer: COMMERCIAL

## 2025-01-09 DIAGNOSIS — Z79.4 TYPE 2 DIABETES MELLITUS WITHOUT COMPLICATION, WITH LONG-TERM CURRENT USE OF INSULIN (MULTI): ICD-10-CM

## 2025-01-09 DIAGNOSIS — E03.9 HYPOTHYROIDISM, UNSPECIFIED TYPE: ICD-10-CM

## 2025-01-09 DIAGNOSIS — E11.9 TYPE 2 DIABETES MELLITUS WITHOUT COMPLICATION, WITH LONG-TERM CURRENT USE OF INSULIN (MULTI): ICD-10-CM

## 2025-01-09 DIAGNOSIS — F20.9 SCHIZOPHRENIA, UNSPECIFIED TYPE: ICD-10-CM

## 2025-01-09 DIAGNOSIS — R53.1 WEAKNESS: ICD-10-CM

## 2025-01-09 DIAGNOSIS — G40.909 SEIZURE DISORDER (MULTI): ICD-10-CM

## 2025-01-09 DIAGNOSIS — I10 BENIGN HYPERTENSION: ICD-10-CM

## 2025-01-09 DIAGNOSIS — F03.90 DEMENTIA, UNSPECIFIED DEMENTIA SEVERITY, UNSPECIFIED DEMENTIA TYPE, UNSPECIFIED WHETHER BEHAVIORAL, PSYCHOTIC, OR MOOD DISTURBANCE OR ANXIETY (MULTI): Primary | ICD-10-CM

## 2025-01-09 DIAGNOSIS — Z91.81 AT RISK FOR FALLS: ICD-10-CM

## 2025-01-09 DIAGNOSIS — N40.0 BENIGN PROSTATIC HYPERPLASIA, UNSPECIFIED WHETHER LOWER URINARY TRACT SYMPTOMS PRESENT: ICD-10-CM

## 2025-01-09 DIAGNOSIS — I25.10 ASHD (ARTERIOSCLEROTIC HEART DISEASE): ICD-10-CM

## 2025-01-09 NOTE — Clinical Note
Patient: Clive Woody  : 1934    Encounter Date: 2025    PLACE OF SERVICE:  [facility].    This is a subsequent visit.    Subjective  Patient ID: Clive Woody is a 89 y.o. male who presents for Follow-up.    Mr. Clive Woody is a 89-year-old male with history of diabetes.  He suffers from dementia and he is aphasic.  He is unable to care for himself and requires supportive care.    Review of Systems   Constitutional:  Negative for chills and fever.   Cardiovascular:  Negative for chest pain.   All other systems reviewed and are negative.    Objective  /80   Pulse 78   Temp 36.7 °C (98 °F)   Resp 16     Physical Exam  Vitals reviewed.   Constitutional:       General: He is not in acute distress.     Comments: This is a well-developed, well-nourished male, sitting in a chair.   HENT:      Right Ear: Tympanic membrane, ear canal and external ear normal.      Left Ear: Tympanic membrane, ear canal and external ear normal.   Eyes:      General: No scleral icterus.     Pupils: Pupils are equal, round, and reactive to light.   Neck:      Vascular: No carotid bruit.   Cardiovascular:      Heart sounds: Normal heart sounds, S1 normal and S2 normal. No murmur heard.     No friction rub.   Pulmonary:      Effort: Pulmonary effort is normal.      Breath sounds: Normal breath sounds and air entry.   Abdominal:      Palpations: There is no hepatomegaly, splenomegaly or mass.   Musculoskeletal:         General: No swelling or deformity. Normal range of motion.      Cervical back: Neck supple.      Right lower leg: No edema.      Left lower leg: No edema.   Lymphadenopathy:      Cervical: No cervical adenopathy.      Upper Body:      Right upper body: No axillary adenopathy.      Left upper body: No axillary adenopathy.      Lower Body: No right inguinal adenopathy. No left inguinal adenopathy.   Neurological:      Mental Status: He is oriented to person, place, and time.      Cranial  Nerves: Cranial nerves 2-12 are intact. No cranial nerve deficit.      Sensory: No sensory deficit.      Motor: Motor function is intact. No weakness.      Gait: Gait is intact.      Deep Tendon Reflexes: Reflexes normal.      Comments: The patient is aphasic.   Psychiatric:         Mood and Affect: Mood normal. Mood is not anxious or depressed. Affect is not angry.         Behavior: Behavior is not agitated.         Thought Content: Thought content normal.         Judgment: Judgment normal.     LAB WORK: Laboratory studies were reviewed.    Assessment/Plan  Problem List Items Addressed This Visit             ICD-10-CM       Advance Directives and General Issues    At risk for falls Z91.81       Cardiac and Vasculature    Benign hypertension I10    ASHD (arteriosclerotic heart disease) I25.10       Endocrine/Metabolic    Diabetes mellitus (Multi) E11.9       Neuro    Dementia - Primary F03.90     Other Visit Diagnoses         Codes    Schizophrenia, unspecified type     F20.9    Benign prostatic hyperplasia, unspecified whether lower urinary tract symptoms present     N40.0    Seizure disorder (Multi)     G40.909    Hypothyroidism, unspecified type     E03.9    Weakness     R53.1        1. Dementia, unchanged.  2. Schizophrenia, on medication.  3. Diabetes, on insulin.  4. BPH, on tamsulosin.  5. Seizure disorder, on antiepileptic.  6. ASHD, on aspirin.  7. Hypertension, med controlled.  8. Hypothyroidism, on levothyroxine.  9. Weakness, on PT/OT.  10. Fall risk, on fall precaution.    Scribe Attestation  By signing my name below, ISofia, Scribe attest that this documentation has been prepared under the direction and in the presence of Sergio Islas MD.       Electronically Signed By: Sergio Islas MD   1/12/25 11:13 PM

## 2025-01-10 VITALS
RESPIRATION RATE: 16 BRPM | SYSTOLIC BLOOD PRESSURE: 126 MMHG | DIASTOLIC BLOOD PRESSURE: 80 MMHG | TEMPERATURE: 98 F | HEART RATE: 78 BPM

## 2025-01-10 ASSESSMENT — ENCOUNTER SYMPTOMS
CHILLS: 0
FEVER: 0

## 2025-01-10 NOTE — PROGRESS NOTES
PLACE OF SERVICE:  [facility].    This is a subsequent visit.    Subjective   Patient ID: Clive Woody is a 89 y.o. male who presents for Follow-up.    Mr. Clive Woody is a 89-year-old male with history of diabetes.  He suffers from dementia and he is aphasic.  He is unable to care for himself and requires supportive care.    Review of Systems   Constitutional:  Negative for chills and fever.   Cardiovascular:  Negative for chest pain.   All other systems reviewed and are negative.    Objective   /80   Pulse 78   Temp 36.7 °C (98 °F)   Resp 16     Physical Exam  Vitals reviewed.   Constitutional:       General: He is not in acute distress.     Comments: This is a well-developed, well-nourished male, sitting in a chair.   HENT:      Right Ear: Tympanic membrane, ear canal and external ear normal.      Left Ear: Tympanic membrane, ear canal and external ear normal.   Eyes:      General: No scleral icterus.     Pupils: Pupils are equal, round, and reactive to light.   Neck:      Vascular: No carotid bruit.   Cardiovascular:      Heart sounds: Normal heart sounds, S1 normal and S2 normal. No murmur heard.     No friction rub.   Pulmonary:      Effort: Pulmonary effort is normal.      Breath sounds: Normal breath sounds and air entry.   Abdominal:      Palpations: There is no hepatomegaly, splenomegaly or mass.   Musculoskeletal:         General: No swelling or deformity. Normal range of motion.      Cervical back: Neck supple.      Right lower leg: No edema.      Left lower leg: No edema.   Lymphadenopathy:      Cervical: No cervical adenopathy.      Upper Body:      Right upper body: No axillary adenopathy.      Left upper body: No axillary adenopathy.      Lower Body: No right inguinal adenopathy. No left inguinal adenopathy.   Neurological:      Mental Status: He is oriented to person, place, and time.      Cranial Nerves: Cranial nerves 2-12 are intact. No cranial nerve deficit.       Sensory: No sensory deficit.      Motor: Motor function is intact. No weakness.      Gait: Gait is intact.      Deep Tendon Reflexes: Reflexes normal.      Comments: The patient is aphasic.   Psychiatric:         Mood and Affect: Mood normal. Mood is not anxious or depressed. Affect is not angry.         Behavior: Behavior is not agitated.         Thought Content: Thought content normal.         Judgment: Judgment normal.     LAB WORK: Laboratory studies were reviewed.    Assessment/Plan   Problem List Items Addressed This Visit             ICD-10-CM       Advance Directives and General Issues    At risk for falls Z91.81       Cardiac and Vasculature    Benign hypertension I10    ASHD (arteriosclerotic heart disease) I25.10       Endocrine/Metabolic    Diabetes mellitus (Multi) E11.9       Neuro    Dementia - Primary F03.90     Other Visit Diagnoses         Codes    Schizophrenia, unspecified type     F20.9    Benign prostatic hyperplasia, unspecified whether lower urinary tract symptoms present     N40.0    Seizure disorder (Multi)     G40.909    Hypothyroidism, unspecified type     E03.9    Weakness     R53.1        1. Dementia, unchanged.  2. Schizophrenia, on medication.  3. Diabetes, on insulin.  4. BPH, on tamsulosin.  5. Seizure disorder, on antiepileptic.  6. ASHD, on aspirin.  7. Hypertension, med controlled.  8. Hypothyroidism, on levothyroxine.  9. Weakness, on PT/OT.  10. Fall risk, on fall precaution.    Scribe Attestation  By signing my name below, ISofia Scribe attest that this documentation has been prepared under the direction and in the presence of Sergio Islas MD.     All medical record entries made by the scribe were personally dictated by me I have reviewed the chart and agree the record accurately reflects my personal performance of his history physical examination and management

## 2025-02-04 ENCOUNTER — APPOINTMENT (OUTPATIENT)
Dept: CARDIOLOGY | Facility: HOSPITAL | Age: OVER 89
DRG: 193 | End: 2025-02-04
Payer: COMMERCIAL

## 2025-02-04 ENCOUNTER — APPOINTMENT (OUTPATIENT)
Dept: RADIOLOGY | Facility: HOSPITAL | Age: OVER 89
DRG: 193 | End: 2025-02-04
Payer: COMMERCIAL

## 2025-02-04 ENCOUNTER — HOSPITAL ENCOUNTER (INPATIENT)
Facility: HOSPITAL | Age: OVER 89
LOS: 4 days | Discharge: NURSING, REHAB OR LONG-TERM CARE | End: 2025-02-09
Attending: EMERGENCY MEDICINE | Admitting: INTERNAL MEDICINE
Payer: COMMERCIAL

## 2025-02-04 DIAGNOSIS — R53.1 ASTHENIA: ICD-10-CM

## 2025-02-04 DIAGNOSIS — K59.00 CONSTIPATION, UNSPECIFIED CONSTIPATION TYPE: ICD-10-CM

## 2025-02-04 DIAGNOSIS — R09.02 HYPOXIA: ICD-10-CM

## 2025-02-04 DIAGNOSIS — R11.2 NAUSEA AND VOMITING, UNSPECIFIED VOMITING TYPE: ICD-10-CM

## 2025-02-04 DIAGNOSIS — J18.9 PNEUMONIA, UNSPECIFIED ORGANISM: Primary | ICD-10-CM

## 2025-02-04 DIAGNOSIS — J18.9 PNEUMONIA OF BOTH LOWER LOBES DUE TO INFECTIOUS ORGANISM: ICD-10-CM

## 2025-02-04 DIAGNOSIS — R13.12 OROPHARYNGEAL DYSPHAGIA: ICD-10-CM

## 2025-02-04 LAB
ALBUMIN SERPL BCP-MCNC: 3.4 G/DL (ref 3.4–5)
ALP SERPL-CCNC: 56 U/L (ref 33–136)
ALT SERPL W P-5'-P-CCNC: 9 U/L (ref 10–52)
ANION GAP SERPL CALC-SCNC: 17 MMOL/L (ref 10–20)
AST SERPL W P-5'-P-CCNC: 14 U/L (ref 9–39)
BASOPHILS # BLD AUTO: 0.04 X10*3/UL (ref 0–0.1)
BASOPHILS NFR BLD AUTO: 0.2 %
BILIRUB SERPL-MCNC: 0.7 MG/DL (ref 0–1.2)
BUN SERPL-MCNC: 31 MG/DL (ref 6–23)
CALCIUM SERPL-MCNC: 9.2 MG/DL (ref 8.6–10.3)
CARDIAC TROPONIN I PNL SERPL HS: 8 NG/L (ref 0–20)
CHLORIDE SERPL-SCNC: 103 MMOL/L (ref 98–107)
CO2 SERPL-SCNC: 22 MMOL/L (ref 21–32)
CREAT SERPL-MCNC: 1.2 MG/DL (ref 0.5–1.3)
EGFRCR SERPLBLD CKD-EPI 2021: 57 ML/MIN/1.73M*2
EOSINOPHIL # BLD AUTO: 0.03 X10*3/UL (ref 0–0.4)
EOSINOPHIL NFR BLD AUTO: 0.2 %
ERYTHROCYTE [DISTWIDTH] IN BLOOD BY AUTOMATED COUNT: 15.1 % (ref 11.5–14.5)
GLUCOSE SERPL-MCNC: 188 MG/DL (ref 74–99)
HCT VFR BLD AUTO: 38.6 % (ref 41–52)
HGB BLD-MCNC: 11.7 G/DL (ref 13.5–17.5)
IMM GRANULOCYTES # BLD AUTO: 0.1 X10*3/UL (ref 0–0.5)
IMM GRANULOCYTES NFR BLD AUTO: 0.6 % (ref 0–0.9)
LYMPHOCYTES # BLD AUTO: 1.14 X10*3/UL (ref 0.8–3)
LYMPHOCYTES NFR BLD AUTO: 6.6 %
MAGNESIUM SERPL-MCNC: 1.91 MG/DL (ref 1.6–2.4)
MCH RBC QN AUTO: 27.7 PG (ref 26–34)
MCHC RBC AUTO-ENTMCNC: 30.3 G/DL (ref 32–36)
MCV RBC AUTO: 91 FL (ref 80–100)
MONOCYTES # BLD AUTO: 0.36 X10*3/UL (ref 0.05–0.8)
MONOCYTES NFR BLD AUTO: 2.1 %
NEUTROPHILS # BLD AUTO: 15.53 X10*3/UL (ref 1.6–5.5)
NEUTROPHILS NFR BLD AUTO: 90.3 %
NRBC BLD-RTO: 0 /100 WBCS (ref 0–0)
PLATELET # BLD AUTO: 243 X10*3/UL (ref 150–450)
POTASSIUM SERPL-SCNC: 4.2 MMOL/L (ref 3.5–5.3)
PROT SERPL-MCNC: 6.4 G/DL (ref 6.4–8.2)
RBC # BLD AUTO: 4.23 X10*6/UL (ref 4.5–5.9)
SODIUM SERPL-SCNC: 138 MMOL/L (ref 136–145)
WBC # BLD AUTO: 17.2 X10*3/UL (ref 4.4–11.3)

## 2025-02-04 PROCEDURE — 84075 ASSAY ALKALINE PHOSPHATASE: CPT | Performed by: EMERGENCY MEDICINE

## 2025-02-04 PROCEDURE — 93005 ELECTROCARDIOGRAM TRACING: CPT

## 2025-02-04 PROCEDURE — 74177 CT ABD & PELVIS W/CONTRAST: CPT | Performed by: STUDENT IN AN ORGANIZED HEALTH CARE EDUCATION/TRAINING PROGRAM

## 2025-02-04 PROCEDURE — 84484 ASSAY OF TROPONIN QUANT: CPT | Performed by: EMERGENCY MEDICINE

## 2025-02-04 PROCEDURE — 74177 CT ABD & PELVIS W/CONTRAST: CPT

## 2025-02-04 PROCEDURE — 99285 EMERGENCY DEPT VISIT HI MDM: CPT | Mod: 25 | Performed by: EMERGENCY MEDICINE

## 2025-02-04 PROCEDURE — 71260 CT THORAX DX C+: CPT | Performed by: STUDENT IN AN ORGANIZED HEALTH CARE EDUCATION/TRAINING PROGRAM

## 2025-02-04 PROCEDURE — 96375 TX/PRO/DX INJ NEW DRUG ADDON: CPT | Mod: 59

## 2025-02-04 PROCEDURE — 36415 COLL VENOUS BLD VENIPUNCTURE: CPT | Performed by: EMERGENCY MEDICINE

## 2025-02-04 PROCEDURE — 2550000001 HC RX 255 CONTRASTS: Performed by: EMERGENCY MEDICINE

## 2025-02-04 PROCEDURE — 85025 COMPLETE CBC W/AUTO DIFF WBC: CPT | Performed by: EMERGENCY MEDICINE

## 2025-02-04 PROCEDURE — 96361 HYDRATE IV INFUSION ADD-ON: CPT

## 2025-02-04 PROCEDURE — 2500000004 HC RX 250 GENERAL PHARMACY W/ HCPCS (ALT 636 FOR OP/ED)

## 2025-02-04 PROCEDURE — 83735 ASSAY OF MAGNESIUM: CPT | Performed by: EMERGENCY MEDICINE

## 2025-02-04 PROCEDURE — 2500000004 HC RX 250 GENERAL PHARMACY W/ HCPCS (ALT 636 FOR OP/ED): Performed by: EMERGENCY MEDICINE

## 2025-02-04 PROCEDURE — 87636 SARSCOV2 & INF A&B AMP PRB: CPT | Performed by: EMERGENCY MEDICINE

## 2025-02-04 RX ORDER — ONDANSETRON HYDROCHLORIDE 2 MG/ML
INJECTION, SOLUTION INTRAVENOUS
Status: COMPLETED
Start: 2025-02-04 | End: 2025-02-04

## 2025-02-04 RX ORDER — ONDANSETRON HYDROCHLORIDE 2 MG/ML
4 INJECTION, SOLUTION INTRAVENOUS ONCE
Status: COMPLETED | OUTPATIENT
Start: 2025-02-04 | End: 2025-02-04

## 2025-02-04 RX ADMIN — ONDANSETRON 4 MG: 2 INJECTION INTRAMUSCULAR; INTRAVENOUS at 23:29

## 2025-02-04 RX ADMIN — IOHEXOL 75 ML: 350 INJECTION, SOLUTION INTRAVENOUS at 23:02

## 2025-02-04 RX ADMIN — ONDANSETRON HYDROCHLORIDE 4 MG: 2 INJECTION, SOLUTION INTRAVENOUS at 23:29

## 2025-02-04 RX ADMIN — SODIUM CHLORIDE 1000 ML: 9 INJECTION, SOLUTION INTRAVENOUS at 21:41

## 2025-02-04 ASSESSMENT — COLUMBIA-SUICIDE SEVERITY RATING SCALE - C-SSRS
1. IN THE PAST MONTH, HAVE YOU WISHED YOU WERE DEAD OR WISHED YOU COULD GO TO SLEEP AND NOT WAKE UP?: NO
2. HAVE YOU ACTUALLY HAD ANY THOUGHTS OF KILLING YOURSELF?: NO
6. HAVE YOU EVER DONE ANYTHING, STARTED TO DO ANYTHING, OR PREPARED TO DO ANYTHING TO END YOUR LIFE?: NO

## 2025-02-04 ASSESSMENT — PAIN - FUNCTIONAL ASSESSMENT: PAIN_FUNCTIONAL_ASSESSMENT: WONG-BAKER FACES

## 2025-02-04 ASSESSMENT — PAIN SCALES - WONG BAKER: WONGBAKER_NUMERICALRESPONSE: NO HURT

## 2025-02-05 ENCOUNTER — APPOINTMENT (OUTPATIENT)
Dept: RADIOLOGY | Facility: HOSPITAL | Age: OVER 89
DRG: 193 | End: 2025-02-05
Payer: COMMERCIAL

## 2025-02-05 PROBLEM — E55.9 VITAMIN D DEFICIENCY: Status: ACTIVE | Noted: 2025-02-05

## 2025-02-05 PROBLEM — N30.00 ACUTE CYSTITIS WITHOUT HEMATURIA: Status: ACTIVE | Noted: 2025-02-05

## 2025-02-05 PROBLEM — R11.2 NAUSEA & VOMITING: Status: ACTIVE | Noted: 2025-02-05

## 2025-02-05 PROBLEM — K59.00 CONSTIPATION: Status: ACTIVE | Noted: 2025-02-05

## 2025-02-05 PROBLEM — J96.01 ACUTE HYPOXIC RESPIRATORY FAILURE (MULTI): Status: ACTIVE | Noted: 2025-02-05

## 2025-02-05 PROBLEM — J18.9 PNEUMONIA, UNSPECIFIED ORGANISM: Status: ACTIVE | Noted: 2025-02-05

## 2025-02-05 PROBLEM — F41.9 ANXIETY: Status: ACTIVE | Noted: 2025-02-05

## 2025-02-05 PROBLEM — E03.9 HYPOTHYROIDISM: Status: ACTIVE | Noted: 2025-02-05

## 2025-02-05 PROBLEM — R50.9 FEVER: Status: ACTIVE | Noted: 2025-02-05

## 2025-02-05 LAB
APPEARANCE UR: CLEAR
ATRIAL RATE: 97 BPM
BILIRUB UR STRIP.AUTO-MCNC: NEGATIVE MG/DL
COLOR UR: YELLOW
FLUAV RNA RESP QL NAA+PROBE: NOT DETECTED
FLUBV RNA RESP QL NAA+PROBE: NOT DETECTED
GLUCOSE BLD MANUAL STRIP-MCNC: 182 MG/DL (ref 74–99)
GLUCOSE BLD MANUAL STRIP-MCNC: 189 MG/DL (ref 74–99)
GLUCOSE UR STRIP.AUTO-MCNC: ABNORMAL MG/DL
KETONES UR STRIP.AUTO-MCNC: ABNORMAL MG/DL
LEUKOCYTE ESTERASE UR QL STRIP.AUTO: ABNORMAL
MRSA DNA SPEC QL NAA+PROBE: NOT DETECTED
MUCOUS THREADS #/AREA URNS AUTO: ABNORMAL /LPF
NITRITE UR QL STRIP.AUTO: NEGATIVE
P AXIS: 56 DEGREES
P OFFSET: 150 MS
P ONSET: 85 MS
PH UR STRIP.AUTO: 5.5 [PH]
PR INTERVAL: 264 MS
PROT UR STRIP.AUTO-MCNC: ABNORMAL MG/DL
Q ONSET: 217 MS
QRS COUNT: 16 BEATS
QRS DURATION: 92 MS
QT INTERVAL: 342 MS
QTC CALCULATION(BAZETT): 434 MS
QTC FREDERICIA: 401 MS
R AXIS: 71 DEGREES
RBC # UR STRIP.AUTO: NEGATIVE MG/DL
RBC #/AREA URNS AUTO: ABNORMAL /HPF
SARS-COV-2 RNA RESP QL NAA+PROBE: NOT DETECTED
SP GR UR STRIP.AUTO: >1.05
SQUAMOUS #/AREA URNS AUTO: ABNORMAL /HPF
T AXIS: 45 DEGREES
T OFFSET: 388 MS
UROBILINOGEN UR STRIP.AUTO-MCNC: NORMAL MG/DL
VENTRICULAR RATE: 97 BPM
WBC #/AREA URNS AUTO: ABNORMAL /HPF
YEAST BUDDING #/AREA UR COMP ASSIST: PRESENT /HPF

## 2025-02-05 PROCEDURE — 70450 CT HEAD/BRAIN W/O DYE: CPT | Performed by: RADIOLOGY

## 2025-02-05 PROCEDURE — 2500000005 HC RX 250 GENERAL PHARMACY W/O HCPCS: Mod: IPSPLIT

## 2025-02-05 PROCEDURE — 96367 TX/PROPH/DG ADDL SEQ IV INF: CPT

## 2025-02-05 PROCEDURE — 2500000004 HC RX 250 GENERAL PHARMACY W/ HCPCS (ALT 636 FOR OP/ED)

## 2025-02-05 PROCEDURE — 94760 N-INVAS EAR/PLS OXIMETRY 1: CPT | Mod: IPSPLIT

## 2025-02-05 PROCEDURE — 87641 MR-STAPH DNA AMP PROBE: CPT | Performed by: EMERGENCY MEDICINE

## 2025-02-05 PROCEDURE — 81001 URINALYSIS AUTO W/SCOPE: CPT | Performed by: FAMILY MEDICINE

## 2025-02-05 PROCEDURE — 87077 CULTURE AEROBIC IDENTIFY: CPT | Mod: GENLAB | Performed by: FAMILY MEDICINE

## 2025-02-05 PROCEDURE — 87640 STAPH A DNA AMP PROBE: CPT | Performed by: EMERGENCY MEDICINE

## 2025-02-05 PROCEDURE — 96365 THER/PROPH/DIAG IV INF INIT: CPT

## 2025-02-05 PROCEDURE — 2500000004 HC RX 250 GENERAL PHARMACY W/ HCPCS (ALT 636 FOR OP/ED): Performed by: EMERGENCY MEDICINE

## 2025-02-05 PROCEDURE — 2500000005 HC RX 250 GENERAL PHARMACY W/O HCPCS: Performed by: EMERGENCY MEDICINE

## 2025-02-05 PROCEDURE — 36415 COLL VENOUS BLD VENIPUNCTURE: CPT | Performed by: FAMILY MEDICINE

## 2025-02-05 PROCEDURE — 1200000002 HC GENERAL ROOM WITH TELEMETRY DAILY: Mod: IPSPLIT

## 2025-02-05 PROCEDURE — 82947 ASSAY GLUCOSE BLOOD QUANT: CPT | Mod: IPSPLIT

## 2025-02-05 PROCEDURE — 96375 TX/PRO/DX INJ NEW DRUG ADDON: CPT

## 2025-02-05 PROCEDURE — 2500000004 HC RX 250 GENERAL PHARMACY W/ HCPCS (ALT 636 FOR OP/ED): Mod: IPSPLIT

## 2025-02-05 PROCEDURE — 96376 TX/PRO/DX INJ SAME DRUG ADON: CPT

## 2025-02-05 PROCEDURE — 2500000002 HC RX 250 W HCPCS SELF ADMINISTERED DRUGS (ALT 637 FOR MEDICARE OP, ALT 636 FOR OP/ED): Mod: IPSPLIT

## 2025-02-05 PROCEDURE — 96366 THER/PROPH/DIAG IV INF ADDON: CPT

## 2025-02-05 PROCEDURE — 87040 BLOOD CULTURE FOR BACTERIA: CPT | Mod: GENLAB | Performed by: FAMILY MEDICINE

## 2025-02-05 PROCEDURE — 2500000001 HC RX 250 WO HCPCS SELF ADMINISTERED DRUGS (ALT 637 FOR MEDICARE OP): Performed by: FAMILY MEDICINE

## 2025-02-05 PROCEDURE — 2500000001 HC RX 250 WO HCPCS SELF ADMINISTERED DRUGS (ALT 637 FOR MEDICARE OP): Mod: IPSPLIT

## 2025-02-05 PROCEDURE — 87086 URINE CULTURE/COLONY COUNT: CPT | Mod: GENLAB | Performed by: FAMILY MEDICINE

## 2025-02-05 PROCEDURE — 94640 AIRWAY INHALATION TREATMENT: CPT | Mod: IPSPLIT

## 2025-02-05 PROCEDURE — 87075 CULTR BACTERIA EXCEPT BLOOD: CPT | Mod: GENLAB | Performed by: FAMILY MEDICINE

## 2025-02-05 PROCEDURE — 99223 1ST HOSP IP/OBS HIGH 75: CPT

## 2025-02-05 PROCEDURE — 70450 CT HEAD/BRAIN W/O DYE: CPT

## 2025-02-05 PROCEDURE — 94664 DEMO&/EVAL PT USE INHALER: CPT | Mod: IPSPLIT

## 2025-02-05 RX ORDER — BUSPIRONE HYDROCHLORIDE 5 MG/1
5 TABLET ORAL 2 TIMES DAILY
Status: DISCONTINUED | OUTPATIENT
Start: 2025-02-05 | End: 2025-02-09 | Stop reason: HOSPADM

## 2025-02-05 RX ORDER — ADHESIVE BANDAGE
30 BANDAGE TOPICAL
COMMUNITY

## 2025-02-05 RX ORDER — ONDANSETRON HYDROCHLORIDE 2 MG/ML
4 INJECTION, SOLUTION INTRAVENOUS EVERY 8 HOURS PRN
Status: DISCONTINUED | OUTPATIENT
Start: 2025-02-05 | End: 2025-02-09 | Stop reason: HOSPADM

## 2025-02-05 RX ORDER — ACETAMINOPHEN 650 MG/1
650 SUPPOSITORY RECTAL EVERY 4 HOURS PRN
Status: DISCONTINUED | OUTPATIENT
Start: 2025-02-05 | End: 2025-02-09 | Stop reason: HOSPADM

## 2025-02-05 RX ORDER — ENOXAPARIN SODIUM 100 MG/ML
40 INJECTION SUBCUTANEOUS EVERY 24 HOURS
Status: DISCONTINUED | OUTPATIENT
Start: 2025-02-05 | End: 2025-02-09 | Stop reason: HOSPADM

## 2025-02-05 RX ORDER — METFORMIN HYDROCHLORIDE 500 MG/1
1000 TABLET ORAL 2 TIMES DAILY
Status: DISCONTINUED | OUTPATIENT
Start: 2025-02-05 | End: 2025-02-09 | Stop reason: HOSPADM

## 2025-02-05 RX ORDER — ACETAMINOPHEN 650 MG/1
650 SUPPOSITORY RECTAL ONCE
Status: COMPLETED | OUTPATIENT
Start: 2025-02-05 | End: 2025-02-05

## 2025-02-05 RX ORDER — ALBUTEROL SULFATE 0.83 MG/ML
2.5 SOLUTION RESPIRATORY (INHALATION) EVERY 2 HOUR PRN
Status: DISCONTINUED | OUTPATIENT
Start: 2025-02-05 | End: 2025-02-07

## 2025-02-05 RX ORDER — TAMSULOSIN HYDROCHLORIDE 0.4 MG/1
0.4 CAPSULE ORAL DAILY
Status: DISCONTINUED | OUTPATIENT
Start: 2025-02-05 | End: 2025-02-09 | Stop reason: HOSPADM

## 2025-02-05 RX ORDER — LORAZEPAM 0.5 MG/1
0.5 TABLET ORAL
Status: DISCONTINUED | OUTPATIENT
Start: 2025-02-06 | End: 2025-02-09 | Stop reason: HOSPADM

## 2025-02-05 RX ORDER — ACETAMINOPHEN 325 MG/1
650 TABLET ORAL EVERY 4 HOURS PRN
Status: DISCONTINUED | OUTPATIENT
Start: 2025-02-05 | End: 2025-02-09 | Stop reason: HOSPADM

## 2025-02-05 RX ORDER — ONDANSETRON 4 MG/1
4 TABLET, ORALLY DISINTEGRATING ORAL EVERY 8 HOURS PRN
Status: DISCONTINUED | OUTPATIENT
Start: 2025-02-05 | End: 2025-02-09 | Stop reason: HOSPADM

## 2025-02-05 RX ORDER — IPRATROPIUM BROMIDE AND ALBUTEROL SULFATE 2.5; .5 MG/3ML; MG/3ML
3 SOLUTION RESPIRATORY (INHALATION) 3 TIMES DAILY
Status: DISCONTINUED | OUTPATIENT
Start: 2025-02-05 | End: 2025-02-07

## 2025-02-05 RX ORDER — ALUMINUM HYDROXIDE, MAGNESIUM HYDROXIDE, AND SIMETHICONE 1200; 120; 1200 MG/30ML; MG/30ML; MG/30ML
30 SUSPENSION ORAL EVERY 4 HOURS PRN
COMMUNITY

## 2025-02-05 RX ORDER — ATORVASTATIN CALCIUM 10 MG/1
5 TABLET, FILM COATED ORAL DAILY
COMMUNITY

## 2025-02-05 RX ORDER — LINAGLIPTIN 5 MG/1
5 TABLET, FILM COATED ORAL DAILY
COMMUNITY

## 2025-02-05 RX ORDER — TAMSULOSIN HYDROCHLORIDE 0.4 MG/1
0.4 CAPSULE ORAL DAILY
COMMUNITY

## 2025-02-05 RX ORDER — POLYETHYLENE GLYCOL 3350 17 G/17G
17 POWDER, FOR SOLUTION ORAL DAILY
Status: DISCONTINUED | OUTPATIENT
Start: 2025-02-05 | End: 2025-02-09 | Stop reason: HOSPADM

## 2025-02-05 RX ORDER — ASPIRIN 81 MG
100 TABLET, DELAYED RELEASE (ENTERIC COATED) ORAL 2 TIMES DAILY
COMMUNITY

## 2025-02-05 RX ORDER — GUAIFENESIN 600 MG/1
600 TABLET, EXTENDED RELEASE ORAL EVERY 12 HOURS PRN
Status: DISCONTINUED | OUTPATIENT
Start: 2025-02-05 | End: 2025-02-09 | Stop reason: HOSPADM

## 2025-02-05 RX ORDER — OXCARBAZEPINE 150 MG/1
300 TABLET, FILM COATED ORAL 2 TIMES DAILY
Status: DISCONTINUED | OUTPATIENT
Start: 2025-02-05 | End: 2025-02-09 | Stop reason: HOSPADM

## 2025-02-05 RX ORDER — ACETAMINOPHEN 500 MG
5000 TABLET ORAL DAILY
COMMUNITY

## 2025-02-05 RX ORDER — CEFTRIAXONE 1 G/50ML
1 INJECTION, SOLUTION INTRAVENOUS EVERY 24 HOURS
Status: DISCONTINUED | OUTPATIENT
Start: 2025-02-05 | End: 2025-02-09

## 2025-02-05 RX ORDER — METFORMIN HYDROCHLORIDE 1000 MG/1
1000 TABLET ORAL 2 TIMES DAILY
COMMUNITY

## 2025-02-05 RX ORDER — SENNOSIDES 8.6 MG/1
1 TABLET ORAL DAILY
Status: DISCONTINUED | OUTPATIENT
Start: 2025-02-05 | End: 2025-02-09 | Stop reason: HOSPADM

## 2025-02-05 RX ORDER — BISACODYL 10 MG/1
10 SUPPOSITORY RECTAL
Status: ON HOLD | COMMUNITY
End: 2025-02-09

## 2025-02-05 RX ORDER — GUAIFENESIN/DEXTROMETHORPHAN 100-10MG/5
5 SYRUP ORAL EVERY 4 HOURS PRN
Status: DISCONTINUED | OUTPATIENT
Start: 2025-02-05 | End: 2025-02-09 | Stop reason: HOSPADM

## 2025-02-05 RX ORDER — INSULIN GLARGINE 100 [IU]/ML
10 INJECTION, SOLUTION SUBCUTANEOUS NIGHTLY
Status: DISCONTINUED | OUTPATIENT
Start: 2025-02-05 | End: 2025-02-09 | Stop reason: HOSPADM

## 2025-02-05 RX ORDER — SODIUM CHLORIDE 9 MG/ML
125 INJECTION, SOLUTION INTRAVENOUS CONTINUOUS
Status: DISCONTINUED | OUTPATIENT
Start: 2025-02-05 | End: 2025-02-05

## 2025-02-05 RX ORDER — OXCARBAZEPINE 300 MG/1
300 TABLET, FILM COATED ORAL 2 TIMES DAILY
COMMUNITY

## 2025-02-05 RX ORDER — INSULIN GLARGINE 100 [IU]/ML
10 INJECTION, SOLUTION SUBCUTANEOUS NIGHTLY
COMMUNITY

## 2025-02-05 RX ORDER — FINASTERIDE 5 MG/1
5 TABLET, FILM COATED ORAL DAILY
COMMUNITY

## 2025-02-05 RX ORDER — PANTOPRAZOLE SODIUM 40 MG/10ML
40 INJECTION, POWDER, LYOPHILIZED, FOR SOLUTION INTRAVENOUS
Status: DISCONTINUED | OUTPATIENT
Start: 2025-02-06 | End: 2025-02-09 | Stop reason: HOSPADM

## 2025-02-05 RX ORDER — ACETAMINOPHEN 160 MG/5ML
650 SOLUTION ORAL EVERY 4 HOURS PRN
Status: DISCONTINUED | OUTPATIENT
Start: 2025-02-05 | End: 2025-02-09 | Stop reason: HOSPADM

## 2025-02-05 RX ORDER — GLIPIZIDE 5 MG/1
5 TABLET ORAL DAILY
COMMUNITY

## 2025-02-05 RX ORDER — KETOROLAC TROMETHAMINE 15 MG/ML
15 INJECTION, SOLUTION INTRAMUSCULAR; INTRAVENOUS ONCE
Status: COMPLETED | OUTPATIENT
Start: 2025-02-05 | End: 2025-02-05

## 2025-02-05 RX ORDER — LORAZEPAM 0.5 MG/1
TABLET ORAL
COMMUNITY

## 2025-02-05 RX ORDER — INSULIN LISPRO 100 [IU]/ML
0-10 INJECTION, SOLUTION INTRAVENOUS; SUBCUTANEOUS
Status: DISCONTINUED | OUTPATIENT
Start: 2025-02-05 | End: 2025-02-09 | Stop reason: HOSPADM

## 2025-02-05 RX ORDER — TALC
6 POWDER (GRAM) TOPICAL NIGHTLY PRN
Status: DISCONTINUED | OUTPATIENT
Start: 2025-02-05 | End: 2025-02-09 | Stop reason: HOSPADM

## 2025-02-05 RX ORDER — FINASTERIDE 5 MG/1
5 TABLET, FILM COATED ORAL DAILY
Status: DISCONTINUED | OUTPATIENT
Start: 2025-02-05 | End: 2025-02-09 | Stop reason: HOSPADM

## 2025-02-05 RX ORDER — DEXTROSE 50 % IN WATER (D50W) INTRAVENOUS SYRINGE
12.5
Status: DISCONTINUED | OUTPATIENT
Start: 2025-02-05 | End: 2025-02-09 | Stop reason: HOSPADM

## 2025-02-05 RX ORDER — LISINOPRIL 10 MG/1
10 TABLET ORAL DAILY
Status: DISCONTINUED | OUTPATIENT
Start: 2025-02-05 | End: 2025-02-09 | Stop reason: HOSPADM

## 2025-02-05 RX ORDER — SENNOSIDES 8.6 MG/1
1 TABLET ORAL DAILY
COMMUNITY

## 2025-02-05 RX ORDER — LEVOTHYROXINE SODIUM 88 UG/1
88 TABLET ORAL DAILY
COMMUNITY

## 2025-02-05 RX ORDER — DEXTROMETHORPHAN POLISTIREX 30 MG/5 ML
1 SUSPENSION, EXTENDED RELEASE 12 HR ORAL
COMMUNITY

## 2025-02-05 RX ORDER — DOCUSATE SODIUM 100 MG/1
100 CAPSULE, LIQUID FILLED ORAL 2 TIMES DAILY
Status: DISCONTINUED | OUTPATIENT
Start: 2025-02-05 | End: 2025-02-09 | Stop reason: HOSPADM

## 2025-02-05 RX ORDER — DEXTROSE 50 % IN WATER (D50W) INTRAVENOUS SYRINGE
25
Status: DISCONTINUED | OUTPATIENT
Start: 2025-02-05 | End: 2025-02-09 | Stop reason: HOSPADM

## 2025-02-05 RX ORDER — KETOROLAC TROMETHAMINE 15 MG/ML
INJECTION, SOLUTION INTRAMUSCULAR; INTRAVENOUS
Status: COMPLETED
Start: 2025-02-05 | End: 2025-02-05

## 2025-02-05 RX ORDER — LISINOPRIL 10 MG/1
10 TABLET ORAL DAILY
COMMUNITY

## 2025-02-05 RX ORDER — ACETAMINOPHEN 500 MG
5000 TABLET ORAL DAILY
Status: DISCONTINUED | OUTPATIENT
Start: 2025-02-05 | End: 2025-02-09 | Stop reason: HOSPADM

## 2025-02-05 RX ORDER — ACETAMINOPHEN 325 MG/1
650 TABLET ORAL EVERY 4 HOURS PRN
COMMUNITY

## 2025-02-05 RX ORDER — LEVOTHYROXINE SODIUM 88 UG/1
88 TABLET ORAL DAILY
Status: DISCONTINUED | OUTPATIENT
Start: 2025-02-05 | End: 2025-02-09 | Stop reason: HOSPADM

## 2025-02-05 RX ORDER — ALUMINUM HYDROXIDE, MAGNESIUM HYDROXIDE, AND SIMETHICONE 1200; 120; 1200 MG/30ML; MG/30ML; MG/30ML
30 SUSPENSION ORAL EVERY 4 HOURS PRN
Status: DISCONTINUED | OUTPATIENT
Start: 2025-02-05 | End: 2025-02-09 | Stop reason: HOSPADM

## 2025-02-05 RX ORDER — BUSPIRONE HYDROCHLORIDE 5 MG/1
5 TABLET ORAL 2 TIMES DAILY
COMMUNITY

## 2025-02-05 RX ORDER — ATORVASTATIN CALCIUM 10 MG/1
5 TABLET, FILM COATED ORAL DAILY
Status: DISCONTINUED | OUTPATIENT
Start: 2025-02-05 | End: 2025-02-09 | Stop reason: HOSPADM

## 2025-02-05 RX ORDER — PANTOPRAZOLE SODIUM 40 MG/1
40 TABLET, DELAYED RELEASE ORAL
Status: DISCONTINUED | OUTPATIENT
Start: 2025-02-06 | End: 2025-02-09 | Stop reason: HOSPADM

## 2025-02-05 RX ORDER — GLIPIZIDE 5 MG/1
5 TABLET ORAL DAILY
Status: DISCONTINUED | OUTPATIENT
Start: 2025-02-05 | End: 2025-02-09 | Stop reason: HOSPADM

## 2025-02-05 RX ADMIN — DOCUSATE SODIUM 100 MG: 100 CAPSULE, LIQUID FILLED ORAL at 20:41

## 2025-02-05 RX ADMIN — Medication 2 L/MIN: at 11:22

## 2025-02-05 RX ADMIN — VANCOMYCIN HYDROCHLORIDE 1500 MG: 1.5 INJECTION, POWDER, LYOPHILIZED, FOR SOLUTION INTRAVENOUS at 03:18

## 2025-02-05 RX ADMIN — Medication 21 PERCENT: at 20:14

## 2025-02-05 RX ADMIN — CEFTRIAXONE 1 G: 1 INJECTION, SOLUTION INTRAVENOUS at 14:08

## 2025-02-05 RX ADMIN — SENNOSIDES 8.6 MG: 8.6 TABLET, FILM COATED ORAL at 15:44

## 2025-02-05 RX ADMIN — ENOXAPARIN SODIUM 40 MG: 40 INJECTION SUBCUTANEOUS at 14:08

## 2025-02-05 RX ADMIN — BUSPIRONE HYDROCHLORIDE 5 MG: 5 TABLET ORAL at 20:40

## 2025-02-05 RX ADMIN — INSULIN GLARGINE 10 UNITS: 100 INJECTION, SOLUTION SUBCUTANEOUS at 20:48

## 2025-02-05 RX ADMIN — IPRATROPIUM BROMIDE AND ALBUTEROL SULFATE 3 ML: .5; 3 SOLUTION RESPIRATORY (INHALATION) at 20:14

## 2025-02-05 RX ADMIN — OXCARBAZEPINE 300 MG: 150 TABLET, FILM COATED ORAL at 20:40

## 2025-02-05 RX ADMIN — Medication 6 MG: at 20:40

## 2025-02-05 RX ADMIN — AZITHROMYCIN MONOHYDRATE 500 MG: 500 INJECTION, POWDER, LYOPHILIZED, FOR SOLUTION INTRAVENOUS at 15:18

## 2025-02-05 RX ADMIN — SODIUM CHLORIDE 125 ML/HR: 9 INJECTION, SOLUTION INTRAVENOUS at 14:08

## 2025-02-05 RX ADMIN — ACETAMINOPHEN 650 MG: 650 SUPPOSITORY RECTAL at 08:53

## 2025-02-05 RX ADMIN — KETOROLAC TROMETHAMINE 15 MG: 15 INJECTION, SOLUTION INTRAMUSCULAR; INTRAVENOUS at 11:34

## 2025-02-05 RX ADMIN — PIPERACILLIN SODIUM AND TAZOBACTAM SODIUM 4.5 G: 4; .5 INJECTION, SOLUTION INTRAVENOUS at 08:49

## 2025-02-05 RX ADMIN — PIPERACILLIN SODIUM AND TAZOBACTAM SODIUM 4.5 G: 4; .5 INJECTION, SOLUTION INTRAVENOUS at 02:19

## 2025-02-05 RX ADMIN — LISINOPRIL 10 MG: 10 TABLET ORAL at 15:44

## 2025-02-05 RX ADMIN — KETOROLAC TROMETHAMINE 15 MG: 15 INJECTION, SOLUTION INTRAMUSCULAR; INTRAVENOUS at 03:18

## 2025-02-05 RX ADMIN — Medication 4 L/MIN: at 02:24

## 2025-02-05 RX ADMIN — SODIUM CHLORIDE 125 ML/HR: 9 INJECTION, SOLUTION INTRAVENOUS at 11:35

## 2025-02-05 SDOH — SOCIAL STABILITY: SOCIAL INSECURITY: ARE THERE ANY APPARENT SIGNS OF INJURIES/BEHAVIORS THAT COULD BE RELATED TO ABUSE/NEGLECT?: UNABLE TO ASSESS

## 2025-02-05 SDOH — SOCIAL STABILITY: SOCIAL INSECURITY
WITHIN THE LAST YEAR, HAVE YOU BEEN RAPED OR FORCED TO HAVE ANY KIND OF SEXUAL ACTIVITY BY YOUR PARTNER OR EX-PARTNER?: PATIENT UNABLE TO ANSWER

## 2025-02-05 SDOH — SOCIAL STABILITY: SOCIAL INSECURITY
WITHIN THE LAST YEAR, HAVE YOU BEEN HUMILIATED OR EMOTIONALLY ABUSED IN OTHER WAYS BY YOUR PARTNER OR EX-PARTNER?: PATIENT UNABLE TO ANSWER

## 2025-02-05 SDOH — ECONOMIC STABILITY: FOOD INSECURITY
WITHIN THE PAST 12 MONTHS, THE FOOD YOU BOUGHT JUST DIDN'T LAST AND YOU DIDN'T HAVE MONEY TO GET MORE.: PATIENT UNABLE TO ANSWER

## 2025-02-05 SDOH — SOCIAL STABILITY: SOCIAL INSECURITY: HAS ANYONE EVER THREATENED TO HURT YOUR FAMILY OR YOUR PETS?: UNABLE TO ASSESS

## 2025-02-05 SDOH — SOCIAL STABILITY: SOCIAL INSECURITY
WITHIN THE LAST YEAR, HAVE YOU BEEN KICKED, HIT, SLAPPED, OR OTHERWISE PHYSICALLY HURT BY YOUR PARTNER OR EX-PARTNER?: PATIENT UNABLE TO ANSWER

## 2025-02-05 SDOH — SOCIAL STABILITY: SOCIAL INSECURITY: ARE YOU OR HAVE YOU BEEN THREATENED OR ABUSED PHYSICALLY, EMOTIONALLY, OR SEXUALLY BY ANYONE?: UNABLE TO ASSESS

## 2025-02-05 SDOH — ECONOMIC STABILITY: INCOME INSECURITY
IN THE PAST 12 MONTHS HAS THE ELECTRIC, GAS, OIL, OR WATER COMPANY THREATENED TO SHUT OFF SERVICES IN YOUR HOME?: PATIENT UNABLE TO ANSWER

## 2025-02-05 SDOH — SOCIAL STABILITY: SOCIAL INSECURITY: WERE YOU ABLE TO COMPLETE ALL THE BEHAVIORAL HEALTH SCREENINGS?: NO

## 2025-02-05 SDOH — SOCIAL STABILITY: SOCIAL INSECURITY: ABUSE: ADULT

## 2025-02-05 SDOH — ECONOMIC STABILITY: FOOD INSECURITY
WITHIN THE PAST 12 MONTHS, YOU WORRIED THAT YOUR FOOD WOULD RUN OUT BEFORE YOU GOT THE MONEY TO BUY MORE.: PATIENT UNABLE TO ANSWER

## 2025-02-05 SDOH — SOCIAL STABILITY: SOCIAL INSECURITY: DO YOU FEEL UNSAFE GOING BACK TO THE PLACE WHERE YOU ARE LIVING?: UNABLE TO ASSESS

## 2025-02-05 SDOH — SOCIAL STABILITY: SOCIAL INSECURITY: WITHIN THE LAST YEAR, HAVE YOU BEEN AFRAID OF YOUR PARTNER OR EX-PARTNER?: PATIENT UNABLE TO ANSWER

## 2025-02-05 SDOH — SOCIAL STABILITY: SOCIAL INSECURITY: DO YOU FEEL ANYONE HAS EXPLOITED OR TAKEN ADVANTAGE OF YOU FINANCIALLY OR OF YOUR PERSONAL PROPERTY?: UNABLE TO ASSESS

## 2025-02-05 SDOH — SOCIAL STABILITY: SOCIAL INSECURITY: DOES ANYONE TRY TO KEEP YOU FROM HAVING/CONTACTING OTHER FRIENDS OR DOING THINGS OUTSIDE YOUR HOME?: UNABLE TO ASSESS

## 2025-02-05 SDOH — SOCIAL STABILITY: SOCIAL INSECURITY: HAVE YOU HAD ANY THOUGHTS OF HARMING ANYONE ELSE?: UNABLE TO ASSESS

## 2025-02-05 SDOH — SOCIAL STABILITY: SOCIAL INSECURITY: HAVE YOU HAD THOUGHTS OF HARMING ANYONE ELSE?: UNABLE TO ASSESS

## 2025-02-05 ASSESSMENT — COGNITIVE AND FUNCTIONAL STATUS - GENERAL
TURNING FROM BACK TO SIDE WHILE IN FLAT BAD: A LOT
DRESSING REGULAR LOWER BODY CLOTHING: A LOT
HELP NEEDED FOR BATHING: TOTAL
TOILETING: TOTAL
DAILY ACTIVITIY SCORE: 9
MOVING FROM LYING ON BACK TO SITTING ON SIDE OF FLAT BED WITH BEDRAILS: A LOT
TOILETING: TOTAL
TURNING FROM BACK TO SIDE WHILE IN FLAT BAD: A LOT
PERSONAL GROOMING: A LOT
WALKING IN HOSPITAL ROOM: TOTAL
MOVING TO AND FROM BED TO CHAIR: A LOT
MOBILITY SCORE: 9
STANDING UP FROM CHAIR USING ARMS: TOTAL
DRESSING REGULAR LOWER BODY CLOTHING: A LOT
PERSONAL GROOMING: A LOT
DAILY ACTIVITIY SCORE: 9
WALKING IN HOSPITAL ROOM: TOTAL
CLIMB 3 TO 5 STEPS WITH RAILING: TOTAL
DAILY ACTIVITIY SCORE: 9
STANDING UP FROM CHAIR USING ARMS: TOTAL
MOVING FROM LYING ON BACK TO SITTING ON SIDE OF FLAT BED WITH BEDRAILS: A LOT
CLIMB 3 TO 5 STEPS WITH RAILING: TOTAL
EATING MEALS: TOTAL
HELP NEEDED FOR BATHING: TOTAL
MOBILITY SCORE: 9
PATIENT BASELINE BEDBOUND: UNABLE TO ASSESS AT THIS TIME
CLIMB 3 TO 5 STEPS WITH RAILING: TOTAL
STANDING UP FROM CHAIR USING ARMS: TOTAL
HELP NEEDED FOR BATHING: TOTAL
PERSONAL GROOMING: A LOT
EATING MEALS: TOTAL
MOVING FROM LYING ON BACK TO SITTING ON SIDE OF FLAT BED WITH BEDRAILS: A LOT
MOVING TO AND FROM BED TO CHAIR: A LOT
DRESSING REGULAR UPPER BODY CLOTHING: A LOT
EATING MEALS: TOTAL
WALKING IN HOSPITAL ROOM: TOTAL
TOILETING: TOTAL
TURNING FROM BACK TO SIDE WHILE IN FLAT BAD: A LOT
DRESSING REGULAR UPPER BODY CLOTHING: A LOT
MOBILITY SCORE: 9
DRESSING REGULAR UPPER BODY CLOTHING: A LOT
DRESSING REGULAR LOWER BODY CLOTHING: A LOT
MOVING TO AND FROM BED TO CHAIR: A LOT

## 2025-02-05 ASSESSMENT — ACTIVITIES OF DAILY LIVING (ADL)
BATHING: DEPENDENT
PATIENT'S MEMORY ADEQUATE TO SAFELY COMPLETE DAILY ACTIVITIES?: UNABLE TO ASSESS
LACK_OF_TRANSPORTATION: PATIENT UNABLE TO ANSWER
LACK_OF_TRANSPORTATION: PATIENT UNABLE TO ANSWER
GROOMING: DEPENDENT
HEARING - LEFT EAR: UNABLE TO ASSESS
WALKS IN HOME: DEPENDENT
TOILETING: DEPENDENT
DRESSING YOURSELF: DEPENDENT
ADEQUATE_TO_COMPLETE_ADL: UNABLE TO ASSESS
HEARING - RIGHT EAR: UNABLE TO ASSESS
JUDGMENT_ADEQUATE_SAFELY_COMPLETE_DAILY_ACTIVITIES: UNABLE TO ASSESS
LACK_OF_TRANSPORTATION: PATIENT UNABLE TO ANSWER
LACK_OF_TRANSPORTATION: PATIENT UNABLE TO ANSWER
FEEDING YOURSELF: DEPENDENT

## 2025-02-05 ASSESSMENT — LIFESTYLE VARIABLES
SKIP TO QUESTIONS 9-10: 0
AUDIT-C TOTAL SCORE: -1
HOW OFTEN DO YOU HAVE A DRINK CONTAINING ALCOHOL: PATIENT UNABLE TO ANSWER
HOW OFTEN DO YOU HAVE 6 OR MORE DRINKS ON ONE OCCASION: PATIENT UNABLE TO ANSWER
HOW MANY STANDARD DRINKS CONTAINING ALCOHOL DO YOU HAVE ON A TYPICAL DAY: PATIENT UNABLE TO ANSWER
AUDIT-C TOTAL SCORE: -1

## 2025-02-05 ASSESSMENT — PATIENT HEALTH QUESTIONNAIRE - PHQ9
2. FEELING DOWN, DEPRESSED OR HOPELESS: NOT AT ALL
SUM OF ALL RESPONSES TO PHQ9 QUESTIONS 1 & 2: 0
1. LITTLE INTEREST OR PLEASURE IN DOING THINGS: NOT AT ALL

## 2025-02-05 ASSESSMENT — PAIN SCALES - GENERAL
PAINLEVEL_OUTOF10: 0 - NO PAIN

## 2025-02-05 ASSESSMENT — PAIN - FUNCTIONAL ASSESSMENT
PAIN_FUNCTIONAL_ASSESSMENT: 0-10
PAIN_FUNCTIONAL_ASSESSMENT: 0-10

## 2025-02-05 NOTE — H&P
History Of Present Illness  Clive Woody is a 89 y.o. male presenting with vomiting. Patient is nonverbal at baseline and resides at [facility].  Per ED note, patient was brought to the ED last night after he had an episode of emesis during dinner. Patient had another bout of emesis en route to the ED per EMS. Patient required up to 3L O2 via NC in the ED; he is not on home O2. He was febrile with TMax 39.1C. He was also tachycardic up to the 110s; BP remained stable in the ED. Labs were significant for WBC 17.2, H/H 11.7/38.6. Flu, COVID negative. UA was indicative of infection with 250 leuk esterase, 6-10 RBCs, 21-50 WBCs, and budding yeast. CT head was negative for acute infarct/hemorrhage. CT chest showed clustered ground glass opacities in the bilateral lower lobes suspicious for infectious bronchiolitis with debris-filled esophagus. CT abdomen/pelvis showed a moderate to large amount of formed stool throughout the colon and prostatomegaly. Patient was given Tylenol suppository for fever and Zofran for nausea. He received Vancomycin and Zosyn in the ED for pneumonia coverage and was admitted to med/surg. SLP has been consulted to rule out aspiration.      Past Medical History  Past Medical History:   Diagnosis Date    Abscess     Agitation     Anxiety     Aphasia     ASHD (arteriosclerotic heart disease)     At risk for falls     BPH (benign prostatic hyperplasia)     Dementia     Diabetes mellitus (Multi)     Hypertension     Hypothyroidism     Psychosis (Multi)     Schizophrenia     Seizures (Multi)     Weakness        Surgical History  History reviewed. No pertinent surgical history.     Social History  He reports that he has never smoked. He does not have any smokeless tobacco history on file. He reports that he does not drink alcohol and does not use drugs.    Family History  No family history on file.     Allergies  Tuberculin ppd    Review of Systems   Unable to perform ROS: Patient  "nonverbal        Physical Exam  Constitutional:       General: He is not in acute distress.     Appearance: He is ill-appearing.   HENT:      Head: Normocephalic and atraumatic.      Mouth/Throat:      Mouth: Mucous membranes are dry.   Eyes:      Conjunctiva/sclera: Conjunctivae normal.   Cardiovascular:      Rate and Rhythm: Normal rate and regular rhythm.   Pulmonary:      Effort: No respiratory distress.      Breath sounds: Rales present. No wheezing.      Comments: Diminished breath sounds, on 2L O2 with SpO2 97%  Abdominal:      General: There is no distension.      Palpations: Abdomen is soft.      Tenderness: There is no abdominal tenderness.   Musculoskeletal:      Right lower leg: Edema present.      Left lower leg: Edema present.   Skin:     General: Skin is warm and dry.   Neurological:      Mental Status: He is alert. Mental status is at baseline.      Comments: Nonverbal (baseline)          Last Recorded Vitals  Blood pressure 106/60, pulse 94, temperature 37.1 °C (98.8 °F), resp. rate 20, height 1.727 m (5' 8\"), weight 83.9 kg (185 lb), SpO2 97%.    Relevant Results        Scheduled medications  atorvastatin, 5 mg, oral, Daily  azithromycin, 500 mg, intravenous, q24h  busPIRone, 5 mg, oral, BID  cefTRIAXone, 1 g, intravenous, q24h  cholecalciferol, 5,000 Units, oral, Daily  docusate sodium, 100 mg, oral, BID  empagliflozin, 10 mg, oral, Daily  enoxaparin, 40 mg, subcutaneous, q24h  finasteride, 5 mg, oral, Daily  glipiZIDE, 5 mg, oral, Daily  insulin glargine, 10 Units, subcutaneous, Nightly  levothyroxine, 88 mcg, oral, Daily  lisinopril, 10 mg, oral, Daily  [START ON 2/6/2025] LORazepam, 0.5 mg, oral, Daily before lunch  [Held by provider] metFORMIN, 1,000 mg, oral, BID  OXcarbazepine, 300 mg, oral, BID  oxygen, , inhalation, Continuous - Inhalation  [START ON 2/6/2025] pantoprazole, 40 mg, oral, Daily before breakfast   Or  [START ON 2/6/2025] pantoprazole, 40 mg, intravenous, Daily before " breakfast  polyethylene glycol, 17 g, oral, Daily  sennosides, 1 tablet, oral, Daily  tamsulosin, 0.4 mg, oral, Daily      Continuous medications     PRN medications  PRN medications: acetaminophen **OR** acetaminophen **OR** acetaminophen, acetaminophen **OR** acetaminophen **OR** acetaminophen, alum-mag hydroxide-simeth, benzocaine-menthol, dextromethorphan-guaifenesin, guaiFENesin, melatonin, ondansetron ODT **OR** ondansetron    Results for orders placed or performed during the hospital encounter of 02/04/25 (from the past 24 hours)   Sars-CoV-2 and Influenza A/B PCR   Result Value Ref Range    Flu A Result Not Detected Not Detected    Flu B Result Not Detected Not Detected    Coronavirus 2019, PCR Not Detected Not Detected   ECG 12 lead   Result Value Ref Range    Ventricular Rate 97 BPM    Atrial Rate 97 BPM    OH Interval 264 ms    QRS Duration 92 ms    QT Interval 342 ms    QTC Calculation(Bazett) 434 ms    P Axis 56 degrees    R Axis 71 degrees    T Axis 45 degrees    QRS Count 16 beats    Q Onset 217 ms    P Onset 85 ms    P Offset 150 ms    T Offset 388 ms    QTC Fredericia 401 ms   CBC and Auto Differential   Result Value Ref Range    WBC 17.2 (H) 4.4 - 11.3 x10*3/uL    nRBC 0.0 0.0 - 0.0 /100 WBCs    RBC 4.23 (L) 4.50 - 5.90 x10*6/uL    Hemoglobin 11.7 (L) 13.5 - 17.5 g/dL    Hematocrit 38.6 (L) 41.0 - 52.0 %    MCV 91 80 - 100 fL    MCH 27.7 26.0 - 34.0 pg    MCHC 30.3 (L) 32.0 - 36.0 g/dL    RDW 15.1 (H) 11.5 - 14.5 %    Platelets 243 150 - 450 x10*3/uL    Neutrophils % 90.3 40.0 - 80.0 %    Immature Granulocytes %, Automated 0.6 0.0 - 0.9 %    Lymphocytes % 6.6 13.0 - 44.0 %    Monocytes % 2.1 2.0 - 10.0 %    Eosinophils % 0.2 0.0 - 6.0 %    Basophils % 0.2 0.0 - 2.0 %    Neutrophils Absolute 15.53 (H) 1.60 - 5.50 x10*3/uL    Immature Granulocytes Absolute, Automated 0.10 0.00 - 0.50 x10*3/uL    Lymphocytes Absolute 1.14 0.80 - 3.00 x10*3/uL    Monocytes Absolute 0.36 0.05 - 0.80 x10*3/uL     Eosinophils Absolute 0.03 0.00 - 0.40 x10*3/uL    Basophils Absolute 0.04 0.00 - 0.10 x10*3/uL   Comprehensive Metabolic Panel   Result Value Ref Range    Glucose 188 (H) 74 - 99 mg/dL    Sodium 138 136 - 145 mmol/L    Potassium 4.2 3.5 - 5.3 mmol/L    Chloride 103 98 - 107 mmol/L    Bicarbonate 22 21 - 32 mmol/L    Anion Gap 17 10 - 20 mmol/L    Urea Nitrogen 31 (H) 6 - 23 mg/dL    Creatinine 1.20 0.50 - 1.30 mg/dL    eGFR 57 (L) >60 mL/min/1.73m*2    Calcium 9.2 8.6 - 10.3 mg/dL    Albumin 3.4 3.4 - 5.0 g/dL    Alkaline Phosphatase 56 33 - 136 U/L    Total Protein 6.4 6.4 - 8.2 g/dL    AST 14 9 - 39 U/L    Bilirubin, Total 0.7 0.0 - 1.2 mg/dL    ALT 9 (L) 10 - 52 U/L   Magnesium   Result Value Ref Range    Magnesium 1.91 1.60 - 2.40 mg/dL   Troponin I, High Sensitivity   Result Value Ref Range    Troponin I, High Sensitivity 8 0 - 20 ng/L   MRSA Surveillance for Vancomycin De-escalation, PCR    Specimen: Anterior Nares; Swab   Result Value Ref Range    MRSA PCR Not Detected Not Detected   Urinalysis with Reflex Culture and Microscopic   Result Value Ref Range    Color, Urine Yellow Light-Yellow, Yellow, Dark-Yellow    Appearance, Urine Clear Clear    Specific Gravity, Urine >1.050 (N) 1.005 - 1.035    pH, Urine 5.5 5.0, 5.5, 6.0, 6.5, 7.0, 7.5, 8.0    Protein, Urine 20 (TRACE) NEGATIVE, 10 (TRACE), 20 (TRACE) mg/dL    Glucose, Urine OVER (4+) (A) Normal mg/dL    Blood, Urine NEGATIVE NEGATIVE mg/dL    Ketones, Urine TRACE (A) NEGATIVE mg/dL    Bilirubin, Urine NEGATIVE NEGATIVE mg/dL    Urobilinogen, Urine Normal Normal mg/dL    Nitrite, Urine NEGATIVE NEGATIVE    Leukocyte Esterase, Urine 250 Sabine/uL (A) NEGATIVE   Microscopic Only, Urine   Result Value Ref Range    WBC, Urine 21-50 (A) 1-5, NONE /HPF    RBC, Urine 6-10 (A) NONE, 1-2, 3-5 /HPF    Squamous Epithelial Cells, Urine 1-9 (SPARSE) Reference range not established. /HPF    Budding Yeast, Urine PRESENT (A) NONE /HPF    Mucus, Urine FEW Reference range not  established. /LPF     CT head wo IV contrast    Result Date: 2/5/2025  Interpreted By:  Osman Maria, STUDY: CT HEAD WO IV CONTRAST;  2/5/2025 12:40 pm   INDICATION: Signs/Symptoms:Altered mental status also septic..     COMPARISON: 11/19/2020   ACCESSION NUMBER(S): [id number]   ORDERING CLINICIAN: CANDICE LOERA   TECHNIQUE: Noncontrast axial CT scan of head was performed. Angled reformats in brain and bone windows were generated. The images were reviewed in bone, brain, blood and soft tissue windows.   FINDINGS: The ventricles, cisterns and sulci are prominent, consistent with mild diffuse volume loss. There are areas of nonspecific white matter hypodensity, which are probably age-related or microvascular in nature.   Gray-white differentiation is intact and there is no evidence of acute cortical infarct. No mass, mass effect or midline shift is seen. There is no evidence of hemorrhage.   The visualized paranasal sinuses are remarkable for likely minimal mucosal thickening or fluid in the left maxillary sinus.         No evidence of acute cortical infarct or intracranial hemorrhage.   Minimal mucosal thickening in the left maxillary sinus.   MACRO: None   Signed by: Osman Maria 2/5/2025 1:07 PM Dictation workstation:   [id number]    ECG 12 lead    Result Date: 2/5/2025  Sinus rhythm with 1st degree AV block Nonspecific T wave abnormality Abnormal ECG When compared with ECG of 29-MAR-2022 21:20, AK interval has increased T wave inversion now evident in Inferior leads    CT chest abdomen pelvis w IV contrast    Result Date: 2/5/2025  Interpreted By:  Rell Alfonso, STUDY: CT CHEST ABDOMEN PELVIS W IV CONTRAST;  2/4/2025 11:00 pm   INDICATION: Signs/Symptoms:vomiting   COMPARISON: CT abdomen pelvis 03/29/2022.   ACCESSION NUMBER(S): [id number]   ORDERING CLINICIAN: DEION RODRIGUES   TECHNIQUE: CT of the chest, abdomen, and pelvis was performed. Contiguous axial images were obtained through the chest, abdomen and  pelvis. Coronal and sagittal reconstructions were performed.   The images were obtained in the portal venous phase.   75 ml of contrast material Omnipaque 350 were administered intravenously without immediate complication.   FINDINGS: CHEST:   LUNG/PLEURA/LARGE AIRWAYS: There are no pleural effusions. There is no evidence of pneumothorax. There are no consolidations. The tracheobronchial tree is patent.   There are bibasilar clustered ground-glass opacities suggestive of infectious or inflammatory bronchiolitis.   VESSELS: No traumatic aortic injury is appreciated within the limitations of this non-EKG gated study.  The thoracic aorta is of normal course and caliber.   Main pulmonary artery and its branches are normal in caliber.   HEART: The heart is normal in size.   There is no pericardial effusion.   MEDIASTINUM AND BRODERICK: No pneumomediastinum, abnormal mediastinal fluid collection or mediastinal hematoma is appreciated. No mediastinal, hilar or axillary adenopathy is present.   Esophagus is mildly distended with debris.   CHEST WALL AND LOWER NECK: No acute fracture or dislocation of the included osseous structures are appreciated.  No suspicious osseous lesions are identified.  The thoracic wall soft tissues are within normal limits.   ABDOMEN:   LIVER: The liver is normal in size and contour. There is no subcapsular hematoma, no perihepatic fluid collection.  There are no suspicious hepatic lesions.   GALLBLADDER: The gallbladder is nondistended, without evidence of radiopaque stone.   BILE DUCTS: The intahepatic and extrahepatic bile ducts are not dilated.   PANCREAS: Mild dilatation of the pancreatic duct measuring up to 6 mm in caliber new from prior, however no evidence of peripancreatic edema or mass.   SPLEEN: No parenchymal perfusion deficit of the spleen is appreciated to suggest contusion or laceration. There is no subcapsular hematoma, no perisplenic fluid collection.   ADRENAL GLANDS: The bilateral  adrenal glands are unremarkable in appearance.   KIDNEYS AND URETERS: No parenchymal perfusion deficit is appreciated in bilateral kidneys to suggest contusion or laceration. There is no subcapsular hematoma, no perinephric fluid collection. The kidneys are normal in size. There is no hydronephrosis. There is no nephrolithiasis. 1.5 cm right upper pole renal cyst similar to prior.   PELVIS:   BLADDER: The urinary bladder is grossly normal.   REPRODUCTIVE ORGANS: Enlarged prostate measuring 5.2 x 6.5 cm axially protruding into the bladder apex.   BOWEL: Small hiatal hernia. The stomach is unremarkable.  The small bowel is normal in caliber without evidence of focal wall thickening or obstruction.  There is no evidence of focal wall thickening or dilatation of the large bowel.  Moderate to large amount of formed stool throughout the colon and rectum without evidence of wall thickening or inflammatory change. Appendix not visualized without secondary signs of appendicitis.   VESSELS: The aorta and IVC are within normal limits.  The principal vasculature of the abdomen and pelvis is patent.   PERITONEUM/RETROPERITONEUM/LYMPH NODES: There is no evidence of intra- or retroperitoneal hematoma.  There is no free or loculated fluid collection, no free intraperitoneal air. No abdominopelvic lymphadenopathy is present.   BONES AND ABDOMINAL WALL: No evidence of acute fracture or dislocation of the included osseous structures.  No suspicious osseous lesions are identified.  There is a right inguinal hernia containing small bowel new from prior, however the bowel is collapsed without evidence of incarceration or obstruction.       CHEST 1.  Clustered ground-glass opacities in the bilateral lower lobes suspicious for acute infectious or inflammatory bronchiolitis. Distended thoracic esophagus filled with debris.   ABDOMEN - PELVIS 1.  Evaluation of lower abdomen significantly limited by motion artifact. There is moderate to large  amount of formed stool throughout the colon rectum without evidence of wall thickening or inflammatory change. Correlation with constipation is recommended. Right inguinal hernia containing loops of small bowel new from 2022, however without bowel obstruction or incarceration. 2. Marked prostatomegaly. 3. Mild dilatation of the pancreatic duct without peripancreatic edema is nonspecific and correlation with pancreatic enzymes is recommended.     Signed by: Rell Alfonso 2/5/2025 12:26 AM Dictation workstation:   [id number]        Assessment/Plan   Assessment & Plan  Pneumonia, unspecified organism    Dementia    Other schizophrenia    Benign hypertension    Diabetes mellitus without complication (Multi)    ASHD (arteriosclerotic heart disease)    Benign prostatic hyperplasia without urinary obstruction    Acute hypoxic respiratory failure (Multi)    Fever    Nausea & vomiting    Constipation    Acute cystitis without hematuria    Hypothyroidism    Vitamin D deficiency    Anxiety      #Acute hypoxic respiratory failure  #Pneumonia, community acquired  #Fever, resolved   - Brought to ED after episode of emesis at [facility]  - TMax 39.1C in ED, Tylenol suppository given with improvement  - Required 3L O2 in the ED, not on home O2  - CT chest: Clustered ground-glass opacities in the bilateral lower lobes   suspicious for acute infectious or inflammatory bronchiolitis. Distended thoracic esophagus filled with debris.   - WBC 17.2 on admission   - COVID, flu negative   - MRSA nares negative   - Procal, BCx pending  - Sputum culture if able   - Start azithromycin and ceftriaxone (day 1)- no recent hospital admissions   - DuoNebs TID  - Tylenol PRN for fever   - RT eval and treat protocol  - Bronchial hygiene  - Monitor SpO2 continuously   - Baseline O2 None   - Wean O2 as tolerated; patient currently on 2L O2 via NC   - SLP consulted to rule out aspiration, appreciate recs    #Nausea and vomiting, resolved  #Constipation  - CT  A/P:  Evaluation of lower abdomen significantly limited by motion   artifact. There is moderate to large amount of formed stool   throughout the colon rectum without evidence of wall thickening or   inflammatory change. Correlation with constipation is recommended.   Right inguinal hernia containing loops of small bowel new from 2022,   however without bowel obstruction or incarceration.   - No emesis since admission  - Start Colace 100 mg BID, Miralax 17 g every day  - Continue Senokot 8.6 mg at bedtime   - Diet as tolerated  - Zofran PRN for nausea/vomiting   - Monitor for BM    #UTI  - UA: 250 leuk esterase, 21-50 WBCs, 6-10 RBCs, budding yeast  - UCx, BCx pending  - Received Zosyn in the ED  - Start ceftriaxone (day 1)  - Adjust ATBs as needed pending final culture results    #HLD  #HTN  - Trop 8  - Continue atorvastatin 5 mg every day, lisinopril 10 mg every day  - Telemetry monitoring  - Monitor BP    #Hypothyroidism   - Continue levothyroxine 88 mcg every day    #T2DM  - Continue empagliflozin 10 mg every day, glipizide 5 mg every day, Lantus 10 units at bedtime  - Metformin held; patient received IV contrast on admission   - SSI three times a day before meals   - Hypoglycemia protocol  - Accuchecks ac/hs/prn  - Diabetic diet     #BPH  - Continue tamsulosin 0.4 mg every day, finasteride 5 mg every day  - Bladder scan PRN    #Schizophrenia  #Anxiety  #Dementia    - Continue buspirone 5 mg BID, lorazepam 0.5 mg every day, oxcarbazepine 300 mg BID   - Delirium precautions: Frequent reorientation, day/night normalization, shades open for sunlight, provide glasses/hearing aids as needed  - From [facility]; likely return on discharge   - PT/OT evals     #Vitamin D deficiency   - Continue cholecalciferol 5000 units every day    DVT PPx: Lovenox   GI PPx: Protonix   Diet: Consistent carb    Code Status: Full     Disposition: Patient requires inpatient management at this time.         Zainab Khoury PA-C

## 2025-02-05 NOTE — PROGRESS NOTES
Pharmacy Medication History Review    Clive Woody is a 89 y.o. male admitted for No Principal Problem: There is no principal problem currently on the Problem List. Please update the Problem List and refresh.. Pharmacy reviewed the patient's [id number] medications and allergies for accuracy.    Sources used to confirm medication list: Facility List  Informant: Caregiver/Facility  Informant credibility: N/A (Informant not utilized, facility/pharmacy/med list used - list last updated on 02/05/2025)     The list below reflectives the updated PTA list. Please review each medication in order reconciliation for additional clarification and justification.  Prior to Admission medications    Medication Sig Start Date End Date Taking? Authorizing Provider   acetaminophen (Tylenol) 325 mg tablet Take 2 tablets (650 mg) by mouth every 4 hours if needed for fever (temp greater than 38.0 C) (or for pain).    Historical Provider, MD   alum-mag hydroxide-simeth (Mylanta) 200-200-20 mg/5 mL oral suspension Take 30 mL by mouth every 4 hours if needed for indigestion.    Historical Provider, MD   atorvastatin (Lipitor) 10 mg tablet Take 0.5 tablets (5 mg) by mouth once daily.    Historical Provider, MD   bisacodyl (Dulcolax) 10 mg suppository Insert 1 suppository (10 mg) into the rectum every 24 (twenty four) hours if needed for constipation.    Historical Provider, MD   busPIRone (Buspar) 5 mg tablet Take 1 tablet (5 mg) by mouth 2 times a day.    Historical Provider, MD   cholecalciferol (Vitamin D-3) 5,000 Units tablet Take 1 tablet (5,000 Units) by mouth once daily.    Historical Provider, MD   docusate sodium (Colace) 100 mg capsule Take 1 tablet (100 mg) by mouth 2 times a day.    Historical Provider, MD   empagliflozin (Jardiance) 10 mg Take 1 tablet (10 mg) by mouth once daily.    Historical Provider, MD   finasteride (Proscar) 5 mg tablet Take 1 tablet (5 mg) by mouth once daily. Do not crush, chew, or split.     Huma Lester MD   glipiZIDE (Glucotrol) 5 mg tablet Take 1 tablet (5 mg) by mouth once daily.    Huma Provider, MD   glucagon 1 mg/0.2 mL syringe Inject 1 mg into the muscle if needed (for low blood sugar.).    Huma Lester MD   insulin glargine (Lantus U-100 Insulin) 100 unit/mL injection Inject 10 Units under the skin once daily at bedtime. Take as directed per insulin instructions.    Huma Lester MD   levothyroxine (Synthroid, Levoxyl) 88 mcg tablet Take 1 tablet (88 mcg) by mouth once daily.    Huma Lester MD   linaGLIPtin (Tradjenta) 5 mg tablet Take 1 tablet (5 mg) by mouth once daily.    Huma Provider, MD   lisinopril 10 mg tablet Take 1 tablet (10 mg) by mouth once daily.    Huma Provider, MD   LORazepam (Ativan) 0.5 mg tablet Take 1 tablet by mouth once daily at noon.    Historical Provider, MD   magnesium hydroxide (Milk of Magnesia) 400 mg/5 mL suspension Take 30 mL by mouth every 24 (twenty four) hours if needed for constipation.    Historical Provider, MD   metFORMIN (Glucophage) 1,000 mg tablet Take 1 tablet (1,000 mg) by mouth 2 times a day.    Historical Provider, MD   mineral oil enema Insert 133 mL (1 enema) into the rectum every 24 (twenty four) hours if needed for constipation.    Historical Provider, MD   OXcarbazepine (Trileptal) 300 mg tablet Take 1 tablet (300 mg) by mouth 2 times a day.    Historical Provider, MD   risperiDONE (Perseris) 120 mg suspension,extended rel syring subcutaneous injection Inject 120 mg under the skin every 30 (thirty) days.    Historical Provider, MD   sennosides (Senokot) 8.6 mg tablet Take 1 tablet (8.6 mg) by mouth once daily.    Historical Provider, MD   tamsulosin (Flomax) 0.4 mg 24 hr capsule Take 1 capsule (0.4 mg) by mouth once daily.    Huma Lester MD        The list below reflectives the updated allergy list. Please review each documented allergy for additional clarification and  justification.  Allergies  Reviewed by Nara Hazel RN on 2/4/2025        Severity Reactions Comments    Tuberculin Ppd Low Rash             Below are additional concerns with the patient's PTA list.  Reviewed medication list from SNF. All medications on the current PTA list have been added by me at this time.    Magnolia Copeland CPhT  Medication History Pharmacy Technician  KRYSTINA 8-4:30  Available via [employer] Secure Chat  OR  [phone]

## 2025-02-05 NOTE — CARE PLAN
The patient's goals for the shift include  unable to assess    The clinical goals for the shift include pt will tolerate IV ATB and remain afebrile    Over the shift, the patient did make progress toward the following goals. Pt remained afebrile after given IV ATB. Continuous fluids stopped. Pt remains guarded/fearful when assessing. Pt refused to eat this shift.       Problem: Fall/Injury  Goal: Not fall by end of shift  Outcome: Progressing  Goal: Be free from injury by end of the shift  Outcome: Progressing  Goal: Verbalize understanding of personal risk factors for fall in the hospital  Outcome: Progressing  Goal: Verbalize understanding of risk factor reduction measures to prevent injury from fall in the home  Outcome: Progressing  Goal: Use assistive devices by end of the shift  Outcome: Progressing  Goal: Pace activities to prevent fatigue by end of the shift  Outcome: Progressing     Problem: Respiratory  Goal: Clear secretions with interventions this shift  Outcome: Progressing  Goal: Minimize anxiety/maximize coping throughout shift  Outcome: Progressing  Goal: Minimal/no exertional discomfort or dyspnea this shift  Outcome: Progressing  Goal: No signs of respiratory distress (eg. Use of accessory muscles. Peds grunting)  Outcome: Progressing  Goal: Patent airway maintained this shift  Outcome: Progressing  Goal: Verbalize decreased shortness of breath this shift  Outcome: Progressing  Goal: Wean oxygen to maintain O2 saturation per order/standard this shift  Outcome: Progressing  Goal: Increase self care and/or family involvement in next 24 hours  Outcome: Progressing     Problem: Pain - Adult  Goal: Verbalizes/displays adequate comfort level or baseline comfort level  Outcome: Progressing     Problem: Safety - Adult  Goal: Free from fall injury  Outcome: Progressing     Problem: Discharge Planning  Goal: Discharge to home or other facility with appropriate resources  Outcome: Progressing     Problem:  Chronic Conditions and Co-morbidities  Goal: Patient's chronic conditions and co-morbidity symptoms are monitored and maintained or improved  Outcome: Progressing     Problem: Nutrition  Goal: Nutrient intake appropriate for maintaining nutritional needs  Outcome: Progressing

## 2025-02-05 NOTE — ED TRIAGE NOTES
Pt was sent into the ER tonight, he had one episode of vomiting after dinner, staff states he wouldn't make eye contact with them, (pt is nonverbal) and went limp so they lowered him to the floor, no fall, no LOC, no recent illness.

## 2025-02-05 NOTE — ED PROVIDER NOTES
HPI   Chief Complaint   Patient presents with    Vomiting     Pt had 1 episode of vomiting after dinner       HPI  Patient is a 89-year-old male with history of dementia and schizophrenia, nonverbal at baseline, sent to the ED today from his nursing home for vomiting.  Patient reportedly had 1 episode of vomiting after dinner tonight.  He then went limp and he got lowered to the floor.  Per EMS report, patient also had 1 episode of vomiting in the squad.  IV was established and EMS administered Zofran prior to arrival.  At this time, patient is awake and alert, but nonverbal, consistent with baseline.  Limited history and ROS were obtained secondary to patient's baseline mental status.      Patient History   Past Medical History:   Diagnosis Date    Abscess     Agitation     Anxiety     Aphasia     ASHD (arteriosclerotic heart disease)     At risk for falls     BPH (benign prostatic hyperplasia)     Dementia     Diabetes mellitus (Multi)     Hypertension     Hypothyroidism     Psychosis (Multi)     Schizophrenia     Seizures (Multi)     Weakness      History reviewed. No pertinent surgical history.  No family history on file.  Social History     Tobacco Use    Smoking status: Never    Smokeless tobacco: Not on file   Vaping Use    Vaping status: Never Used   Substance Use Topics    Alcohol use: Never    Drug use: Never       Physical Exam   ED Triage Vitals [02/04/25 2102]   Temperature Heart Rate Respirations BP   36.2 °C (97.1 °F) 70 20 91/50      Pulse Ox Temp Source Heart Rate Source Patient Position   94 % Temporal -- --      BP Location FiO2 (%)     -- --       Physical Exam  Vitals and nursing note reviewed.   Constitutional:       General: He is not in acute distress.     Appearance: He is not toxic-appearing.   HENT:      Head: Normocephalic.      Mouth/Throat:      Mouth: Mucous membranes are moist.   Eyes:      Extraocular Movements: Extraocular movements intact.      Conjunctiva/sclera: Conjunctivae  normal.   Cardiovascular:      Rate and Rhythm: Normal rate and regular rhythm.      Pulses: Normal pulses.   Pulmonary:      Effort: Pulmonary effort is normal. No respiratory distress.      Breath sounds: Normal breath sounds. No wheezing.   Abdominal:      General: There is no distension.      Palpations: Abdomen is soft.   Musculoskeletal:         General: No swelling.      Cervical back: Neck supple.   Skin:     General: Skin is warm and dry.      Capillary Refill: Capillary refill takes less than 2 seconds.   Neurological:      Mental Status: He is alert. Mental status is at baseline.      Comments: Patient is alert, nonverbal.  Does not follow commands.  Moves all 4 extremities purposefully.  Consistent with his baseline mental status           ED Course & [facility]   ED Course as of 02/05/25 0242   Tue Feb 04, 2025 2136 EKG obtained at 2125, interpreted by myself.  Normal sinus rhythm with a ventricular rate of 97, no axis deviation, first-degree AV block present with prolonged AZ interval of 264, otherwise no acute ischemic changes [VT]      ED Course User Index  [VT] Kristi RODRIGUEZ MD         Diagnoses as of 02/05/25 0242   Nausea and vomiting, unspecified vomiting type   Pneumonia of both lower lobes due to infectious organism   Hypoxia             No data recorded     [place] Coma Scale Score: 9 (02/04/25 2142 : Nara Hazel RN)                         Medical Decision Making  Patient was seen and evaluated for vomiting at his facility.  On arrival, patient is slightly hypotensive with blood pressure of 91/50.  Vital signs are otherwise unremarkable.  Patient is placed on a cardiac monitor with continuous pulse ox.  Peripheral IV is established and patient is started on a 1 L bolus of normal saline.  Additional lab work and imaging ordered for further evaluation of his symptoms.    CBC shows leukocytosis with WBC of 17.2, otherwise unremarkable.  CMP is unremarkable.  Magnesium is normal at 1.9.   High-sensitivity troponin is normal at 8.  COVID-19 and influenza swabs are negative.    CT chest abdomen pelvis w IV contrast   Final Result   CHEST   1.  Clustered ground-glass opacities in the bilateral lower lobes   suspicious for acute infectious or inflammatory bronchiolitis.   Distended thoracic esophagus filled with debris.        ABDOMEN - PELVIS   1.  Evaluation of lower abdomen significantly limited by motion   artifact. There is moderate to large amount of formed stool   throughout the colon rectum without evidence of wall thickening or   inflammatory change. Correlation with constipation is recommended.   Right inguinal hernia containing loops of small bowel new from 2022,   however without bowel obstruction or incarceration.   2. Marked prostatomegaly.   3. Mild dilatation of the pancreatic duct without peripancreatic   edema is nonspecific and correlation with pancreatic enzymes is   recommended.             Signed by: Rell Alfonso 2/5/2025 12:26 AM   Dictation workstation:   [id number]        While here in the ED, patient started becoming febrile and tachycardic.  He is therefore administered Toradol IV.  Given findings consistent with pneumonia, patient will be started on vancomycin and Zosyn for HCAP.  Patient also start becoming hypoxic on room air and is currently requiring 4 L nasal cannula.  Patient will need admission for further management of his pneumonia.  However, there are apparently no open beds anywhere in the system right now.  Patient will therefore continue to be managed here in the ED with plans to admit the patient here at [facility] once there are morning discharges later today.    Procedure  Procedures        Kristi RODRIGUEZ MD  02/05/25 0250

## 2025-02-05 NOTE — NURSING NOTE
Admitted to floor with [place]. Non verbal at present. Called Nursing home for clarification of Diet, behaviour and Code status.   Diet from NH LOW SALT, REGULAR THIN CONSISTENCY. BOOST PLUS 8OZ also If patient starts to clap or slap himself they give him puddings, apple sauce cookies.

## 2025-02-05 NOTE — PROGRESS NOTES
02/05/25 1553   Discharge Planning   Living Arrangements Other (Comment)  ([facility])   Support Systems Home care staff   Assistance Needed Per [facility], patient ambulation is independent; assistance with care and doesn't use oxygen.  Patient is nonverbal.  Patient is long term as facility and can return without barriers.   Type of Residence Nursing home/residential care   Do you have animals or pets at home? No   Home or Post Acute Services Post acute facilities (Rehab/SNF/etc)   Type of Post Acute Facility Services Long term care   Expected Discharge Disposition Inter  ([facility])   Does the patient need discharge transport arranged? Yes   RoundTrip coordination needed? Yes   Has discharge transport been arranged? No   Financial Resource Strain   How hard is it for you to pay for the very basics like food, housing, medical care, and heating? Pt Unable  (Nonverbal and at ICF)   Housing Stability   In the last 12 months, was there a time when you were not able to pay the mortgage or rent on time? Pt Unable  (Nonverbal and at ICF)   In the past 12 months, how many times have you moved where you were living? 0   At any time in the past 12 months, were you homeless or living in a shelter (including now)? Pt Unable  (Nonverbal and at ICF)   Transportation Needs   In the past 12 months, has lack of transportation kept you from medical appointments or from getting medications? Pt Unable  (Nonverbal and at ICF)   In the past 12 months, has lack of transportation kept you from meetings, work, or from getting things needed for daily living? Pt Unable  (Nonverbal and at ICF)   Patient Choice   Patient / Family choosing to utilize agency / facility established prior to hospitalization Yes   Stroke Family Assessment   Stroke Family Assessment Needed No   Intensity of Service   Intensity of Service >30 min     4:05 PM  Spoke with legal guardian/patient's niece Avani Shaw [phone].  She  lives in [place].  She would like updated on status or changes in care.  Patient can return to [facility] when medically stable.

## 2025-02-06 PROBLEM — N17.9 AKI (ACUTE KIDNEY INJURY) (CMS-HCC): Status: ACTIVE | Noted: 2025-02-06

## 2025-02-06 LAB
ANION GAP SERPL CALC-SCNC: 11 MMOL/L (ref 10–20)
BNP SERPL-MCNC: 156 PG/ML (ref 0–99)
BUN SERPL-MCNC: 44 MG/DL (ref 6–23)
CALCIUM SERPL-MCNC: 7.8 MG/DL (ref 8.6–10.3)
CHLORIDE SERPL-SCNC: 107 MMOL/L (ref 98–107)
CO2 SERPL-SCNC: 26 MMOL/L (ref 21–32)
CREAT SERPL-MCNC: 1.63 MG/DL (ref 0.5–1.3)
EGFRCR SERPLBLD CKD-EPI 2021: 40 ML/MIN/1.73M*2
ERYTHROCYTE [DISTWIDTH] IN BLOOD BY AUTOMATED COUNT: 15.7 % (ref 11.5–14.5)
GLUCOSE BLD MANUAL STRIP-MCNC: 91 MG/DL (ref 74–99)
GLUCOSE BLD MANUAL STRIP-MCNC: 95 MG/DL (ref 74–99)
GLUCOSE SERPL-MCNC: 113 MG/DL (ref 74–99)
HCT VFR BLD AUTO: 34.5 % (ref 41–52)
HGB BLD-MCNC: 10.9 G/DL (ref 13.5–17.5)
MCH RBC QN AUTO: 27.9 PG (ref 26–34)
MCHC RBC AUTO-ENTMCNC: 31.6 G/DL (ref 32–36)
MCV RBC AUTO: 88 FL (ref 80–100)
NRBC BLD-RTO: 0 /100 WBCS (ref 0–0)
PLATELET # BLD AUTO: 235 X10*3/UL (ref 150–450)
POTASSIUM SERPL-SCNC: 3.9 MMOL/L (ref 3.5–5.3)
PROCALCITONIN SERPL-MCNC: 12.89 NG/ML
RBC # BLD AUTO: 3.91 X10*6/UL (ref 4.5–5.9)
SODIUM SERPL-SCNC: 140 MMOL/L (ref 136–145)
WBC # BLD AUTO: 15.4 X10*3/UL (ref 4.4–11.3)

## 2025-02-06 PROCEDURE — 80048 BASIC METABOLIC PNL TOTAL CA: CPT | Mod: IPSPLIT

## 2025-02-06 PROCEDURE — 2500000004 HC RX 250 GENERAL PHARMACY W/ HCPCS (ALT 636 FOR OP/ED): Mod: IPSPLIT

## 2025-02-06 PROCEDURE — 2500000002 HC RX 250 W HCPCS SELF ADMINISTERED DRUGS (ALT 637 FOR MEDICARE OP, ALT 636 FOR OP/ED): Mod: IPSPLIT

## 2025-02-06 PROCEDURE — 85027 COMPLETE CBC AUTOMATED: CPT | Mod: IPSPLIT

## 2025-02-06 PROCEDURE — 2500000001 HC RX 250 WO HCPCS SELF ADMINISTERED DRUGS (ALT 637 FOR MEDICARE OP): Mod: IPSPLIT

## 2025-02-06 PROCEDURE — 83880 ASSAY OF NATRIURETIC PEPTIDE: CPT | Mod: IPSPLIT

## 2025-02-06 PROCEDURE — 84145 PROCALCITONIN (PCT): CPT | Mod: GENLAB

## 2025-02-06 PROCEDURE — 82947 ASSAY GLUCOSE BLOOD QUANT: CPT | Mod: IPSPLIT

## 2025-02-06 PROCEDURE — 92610 EVALUATE SWALLOWING FUNCTION: CPT | Mod: GN,IPSPLIT

## 2025-02-06 PROCEDURE — 94640 AIRWAY INHALATION TREATMENT: CPT | Mod: IPSPLIT

## 2025-02-06 PROCEDURE — 94760 N-INVAS EAR/PLS OXIMETRY 1: CPT | Mod: IPSPLIT

## 2025-02-06 PROCEDURE — 36415 COLL VENOUS BLD VENIPUNCTURE: CPT | Mod: IPSPLIT

## 2025-02-06 PROCEDURE — 1200000002 HC GENERAL ROOM WITH TELEMETRY DAILY: Mod: IPSPLIT

## 2025-02-06 PROCEDURE — 2500000005 HC RX 250 GENERAL PHARMACY W/O HCPCS: Mod: IPSPLIT

## 2025-02-06 PROCEDURE — 99233 SBSQ HOSP IP/OBS HIGH 50: CPT

## 2025-02-06 RX ORDER — SODIUM CHLORIDE 9 MG/ML
75 INJECTION, SOLUTION INTRAVENOUS CONTINUOUS
Status: DISPENSED | OUTPATIENT
Start: 2025-02-06 | End: 2025-02-07

## 2025-02-06 RX ORDER — AZITHROMYCIN 250 MG/1
500 TABLET, FILM COATED ORAL
Status: DISCONTINUED | OUTPATIENT
Start: 2025-02-06 | End: 2025-02-06

## 2025-02-06 RX ORDER — BISACODYL 10 MG/1
10 SUPPOSITORY RECTAL DAILY
Status: DISCONTINUED | OUTPATIENT
Start: 2025-02-06 | End: 2025-02-09 | Stop reason: HOSPADM

## 2025-02-06 RX ADMIN — Medication 21 PERCENT: at 21:26

## 2025-02-06 RX ADMIN — ENOXAPARIN SODIUM 40 MG: 40 INJECTION SUBCUTANEOUS at 13:45

## 2025-02-06 RX ADMIN — CEFTRIAXONE 1 G: 1 INJECTION, SOLUTION INTRAVENOUS at 13:45

## 2025-02-06 RX ADMIN — EMPAGLIFLOZIN 10 MG: 10 TABLET, FILM COATED ORAL at 09:19

## 2025-02-06 RX ADMIN — IPRATROPIUM BROMIDE AND ALBUTEROL SULFATE 3 ML: .5; 3 SOLUTION RESPIRATORY (INHALATION) at 21:26

## 2025-02-06 RX ADMIN — OXCARBAZEPINE 300 MG: 150 TABLET, FILM COATED ORAL at 21:41

## 2025-02-06 RX ADMIN — SODIUM CHLORIDE 75 ML/HR: 9 INJECTION, SOLUTION INTRAVENOUS at 21:39

## 2025-02-06 RX ADMIN — FINASTERIDE 5 MG: 5 TABLET, FILM COATED ORAL at 09:19

## 2025-02-06 RX ADMIN — BISACODYL 10 MG: 10 SUPPOSITORY RECTAL at 12:52

## 2025-02-06 RX ADMIN — AZITHROMYCIN MONOHYDRATE 500 MG: 500 INJECTION, POWDER, LYOPHILIZED, FOR SOLUTION INTRAVENOUS at 14:10

## 2025-02-06 RX ADMIN — TAMSULOSIN HYDROCHLORIDE 0.4 MG: 0.4 CAPSULE ORAL at 09:18

## 2025-02-06 RX ADMIN — DOCUSATE SODIUM 100 MG: 100 CAPSULE, LIQUID FILLED ORAL at 09:19

## 2025-02-06 RX ADMIN — PANTOPRAZOLE SODIUM 40 MG: 40 TABLET, DELAYED RELEASE ORAL at 06:52

## 2025-02-06 RX ADMIN — CHOLECALCIFEROL TAB 125 MCG (5000 UNIT) 5000 UNITS: 125 TAB at 09:18

## 2025-02-06 RX ADMIN — SENNOSIDES 8.6 MG: 8.6 TABLET, FILM COATED ORAL at 09:18

## 2025-02-06 RX ADMIN — LEVOTHYROXINE SODIUM 88 MCG: 88 TABLET ORAL at 09:18

## 2025-02-06 RX ADMIN — ATORVASTATIN CALCIUM 5 MG: 10 TABLET, FILM COATED ORAL at 09:19

## 2025-02-06 RX ADMIN — SODIUM CHLORIDE 75 ML/HR: 9 INJECTION, SOLUTION INTRAVENOUS at 09:18

## 2025-02-06 RX ADMIN — IPRATROPIUM BROMIDE AND ALBUTEROL SULFATE 3 ML: .5; 3 SOLUTION RESPIRATORY (INHALATION) at 16:51

## 2025-02-06 RX ADMIN — POLYETHYLENE GLYCOL 3350 17 G: 17 POWDER, FOR SOLUTION ORAL at 09:18

## 2025-02-06 RX ADMIN — IPRATROPIUM BROMIDE AND ALBUTEROL SULFATE 3 ML: .5; 3 SOLUTION RESPIRATORY (INHALATION) at 09:38

## 2025-02-06 RX ADMIN — BUSPIRONE HYDROCHLORIDE 5 MG: 5 TABLET ORAL at 09:19

## 2025-02-06 RX ADMIN — GLIPIZIDE 5 MG: 5 TABLET ORAL at 09:19

## 2025-02-06 RX ADMIN — OXCARBAZEPINE 300 MG: 150 TABLET, FILM COATED ORAL at 09:18

## 2025-02-06 RX ADMIN — LORAZEPAM 0.5 MG: 0.5 TABLET ORAL at 12:52

## 2025-02-06 ASSESSMENT — PAIN SCALES - GENERAL
PAINLEVEL_OUTOF10: 0 - NO PAIN

## 2025-02-06 ASSESSMENT — PAIN - FUNCTIONAL ASSESSMENT
PAIN_FUNCTIONAL_ASSESSMENT: PAINAD (PAIN ASSESSMENT IN ADVANCED DEMENTIA SCALE)
PAIN_FUNCTIONAL_ASSESSMENT: 0-10
PAIN_FUNCTIONAL_ASSESSMENT: 0-10

## 2025-02-06 ASSESSMENT — COGNITIVE AND FUNCTIONAL STATUS - GENERAL
MOBILITY SCORE: 9
DRESSING REGULAR UPPER BODY CLOTHING: TOTAL
STANDING UP FROM CHAIR USING ARMS: TOTAL
HELP NEEDED FOR BATHING: TOTAL
WALKING IN HOSPITAL ROOM: TOTAL
WALKING IN HOSPITAL ROOM: TOTAL
PERSONAL GROOMING: TOTAL
DRESSING REGULAR LOWER BODY CLOTHING: TOTAL
DAILY ACTIVITIY SCORE: 9
MOVING FROM LYING ON BACK TO SITTING ON SIDE OF FLAT BED WITH BEDRAILS: A LOT
TOILETING: TOTAL
DAILY ACTIVITIY SCORE: 6
DRESSING REGULAR LOWER BODY CLOTHING: TOTAL
DRESSING REGULAR UPPER BODY CLOTHING: TOTAL
TOILETING: TOTAL
TURNING FROM BACK TO SIDE WHILE IN FLAT BAD: A LOT
TURNING FROM BACK TO SIDE WHILE IN FLAT BAD: A LOT
CLIMB 3 TO 5 STEPS WITH RAILING: TOTAL
MOBILITY SCORE: 9
HELP NEEDED FOR BATHING: TOTAL
PERSONAL GROOMING: TOTAL
EATING MEALS: TOTAL
MOVING TO AND FROM BED TO CHAIR: A LOT
CLIMB 3 TO 5 STEPS WITH RAILING: TOTAL
MOVING TO AND FROM BED TO CHAIR: A LOT
STANDING UP FROM CHAIR USING ARMS: TOTAL
MOVING FROM LYING ON BACK TO SITTING ON SIDE OF FLAT BED WITH BEDRAILS: A LOT

## 2025-02-06 ASSESSMENT — PAIN SCALES - PAIN ASSESSMENT IN ADVANCED DEMENTIA (PAINAD)
CONSOLABILITY: NO NEED TO CONSOLE
BREATHING: NORMAL
TOTALSCORE: 0
FACIALEXPRESSION: SMILING OR INEXPRESSIVE
BODYLANGUAGE: RELAXED

## 2025-02-06 ASSESSMENT — PAIN SCALES - WONG BAKER
WONGBAKER_NUMERICALRESPONSE: NO HURT
WONGBAKER_NUMERICALRESPONSE: NO HURT

## 2025-02-06 NOTE — CARE PLAN
The patient's goals for the shift include      The clinical goals for the shift include patient will remain afebrile this shift    Over the shift, the patient did make progress toward the following goals.       Problem: Fall/Injury  Goal: Not fall by end of shift  Outcome: Progressing     Problem: Fall/Injury  Goal: Be free from injury by end of the shift  Outcome: Progressing     Problem: Safety - Adult  Goal: Free from fall injury  Outcome: Progressing

## 2025-02-06 NOTE — PROGRESS NOTES
Inpatient Speech-Language Pathology Clinical Swallow Evaluation       Patient Name: Clive Woody  MRN: [id number]  : 1934  Patient Room: [address] [place], [place] [address]  Today's Date: 25  Time Calculation  Start Time: 1015  Stop Time: 1035  Time Calculation (min): 20 min       Recommendations:  Solid consistency: Pureed (IDDSI level 4) consider non-oral nutrition/goals of care discussion if pt continues to be agitated/orally defensive  Liquid consistency: Thin (IDDSI 0)  Medication administration: Crushed in puree, (verify with pharmacy/physician), consider non-oral medication administration if pt continues to be agitated/orally defensive  Compensatory swallow strategies: Seated upright - as close to 90 degrees as possible, Remain upright for 20-30 minutes after meals, 1:1 assistance with meals, Alternate solids and liquids, Single sips, Small bites/sips, Eat/feed slowly, and Stringent oral care routine - 4-6x/day (before/after meals)  MBSS: No  Additional Considerations: If pt's oral defensiveness and agitation continues when presented with solids, recommend goals of care discussion with pt's family/caregivers. Given pt's advanced age and advanced dementia, pt is not ideal candidate for alternative means to maintain nutrition/hydration per evidenced based practice.     Assessment:  Pt presents with suspected functional swallow upon completion of CSE. However, additional assessment is needed given limited pt participation this date. Given that there is no suspected laryngeal dysfunction and/or airway compromise, pt is at a low risk of developing pulmonary complications associated with suspected aspiration given the identified risk factors and prognostic factors. If nursing notes increase in s/s of aspiration, temperature spikes, or increase in O2 requirements, recommend downgrade to NPO until SLP can further evaluate.        Plan:   Skilled speech therapy services are indicated to provide training and instruction  "regarding the use of compensatory swallow strategies, for pt/caregiver education in order to reduce risk of aspiration, dehydration and malnutrition. , to assess tolerance of diet , to determine ability to upgrade diet after PO trials with SLP  Inpatient/Swing Bed or Outpatient: Inpatient  SLP Frequency: 3x per week  Discussed POC: Patient, Caregiver/family, Nursing, Physician  Discussed Risks/Benefits: Yes, Patient, Caregiver/Family, Nursing, Physician  Patient/Caregiver Agreeable: Yes   Discharge recommendation: Unable to determine at this time; please see follow-up notes for DC recommendation.  ST OK to Discharge: Yes     Impressions:  Current Diet: Regular (IDDSI level 7); Thin (IDDSI 0)  Relevant imaging results: CT CAP - \"1.  Clustered ground-glass opacities in the bilateral lower lobes  suspicious for acute infectious or inflammatory bronchiolitis.  Distended thoracic esophagus filled with debris.\"  Labs: WBC high, BUN high, and Creatinine high; concern for infection development, dehydration, confusion/AMS, weakness, lethargy, and fatigue  Temperature: afebrile  Respiratory Status: Room air; SPO2 92; RR19  Cranial Nerve Exam: Unable to complete d/t pt's cognitive status  Oral [facility] Exam: Unable to visualize oral cavity d/t oral defensive. Noted dried secretions on labial surface.   [place] Swallow Protocol: contraindicated d/t cognitive status  Textures Administered: Sips Water; pt declined all attempts to provide PO trials of pudding  Clinical Observations: Oral phase - No anterior loss of liquids. No oral residuals observed. Pharyngeal phase - no overt s/s of aspiration with straw sips of thin. Unable to assess oral/pharyngeal function with solid trials this date. Pt orally defensive when presented with trials of 5ml of pudding. Pt clamping his lips/jaw closed and turning his head away repeatedly. When SLP attempted to reassure pt and redirect to encourage PO trials of pudding, pt continued to be orally " defensive and began raising his hand to push spoon away. SLP discontinued swallow assessment at this time in order to preserve pt and clinician safety.    Risk factors for Aspiration PNA: Age (>65), UTI, Polypharmacy (>5), Immune Status (elevated WBC and/or febrile), Modified Diet, Dependent for oral care, and Dependent for feeding  Prognostic factors that mitigate risk: Stable respiratory status and Mobility        Goals:   Pt will maintain adequate hydration/nutrition (monitored via lab values) with optimal safety and efficiency via PO intake on least restrictive diet without evidence of pulmonary compromise or infection development (monitored via temp/WBC).   GOAL START: 2/6/2025  TIMEFRAME: 2 weeks  GOAL END: 2/20/2025   Status: Goal initiated this date   Progress this date: Labs: WBC high, BUN high, and Creatinine high; concern for infection development, dehydration, confusion/AMS, weakness, lethargy, and fatigue; Temperature: afebrile; Respiratory Status: Room air; SPO2 92; RR19     Pt will demonstrate understanding/carryover of compensatory safe swallow strategies (slow rate, small bites/sips, alternating consistencies) in 90% of opportunities with min verbal cues.   GOAL START: 2/6/2025  TIMEFRAME: 2 weeks  GOAL END: 2/20/2025   Status: Goal initiated this date   Progress this date: n/a    Pt will participate in continued assessment of swallow safety and efficiency in order to further inform POC and establish least restrictive diet.    GOAL START: 2/6/2025  TIMEFRAME: 2 weeks  GOAL END: 2/20/2025   Status: Goal initiated this date   Progress this date: n/a          General Information:  Admission Dx: PNA, unspecified  Chief Complaint: Medical team requesting swallow evaluation d/t concerns for aspiration following emetic episode. Primary bedside nursing reporting that pt has been defensive and resisting attempts to provide care. Pt declined all PO intake yesterday 2/5 and thus far this morning on 2/6.   Pt  Status: Pt alert, semi-reclined in bed. Pt unable to follow directions or imitate SLP model for CNE and oral mech exam.   Pt's goal for ST: Unable to state - pt nonverbal at baseline  SLP Received On: 02/06/25  Patient Class: Inpatient   Living Environment: Nursing home (skilled/long-term)  Ordering Physician: Marcello Dela Cruz MD  Reason for Referral: Suspected oral/pharyngeal dysphagia, r/o aspiration, determine if MBSS needed  Referred By: Zainab Taveras PA-C  Past Medical History Relevant to Rehab: Clive Woody is a 89 y.o. male presenting with vomiting. Patient is nonverbal at baseline and resides at [facility]  Per ED note, patient was brought to the ED last night after he had an episode of emesis during dinner. Patient had another bout of emesis en route to the ED per EMS. Patient required up to 3L O2 via NC in the ED; he is not on home O2. He was febrile with TMax 39.1C. He was also tachycardic up to the 110s; BP remained stable in the ED. Labs were significant for WBC 17.2, H/H 11.7/38.6. Flu, COVID negative. UA was indicative of infection  [employer] Diet: Reg/Thin  Current Diet : Reg/Thin    Pain:   Pain Assessment: 0-10  0-10 (Numeric) Pain Score: 0 - No pain              Treatment  Treatment provided: No      Education:  Learner Patient   Barriers to Learning Cognitive limitations Communication limitations   Method Verbal and Demonstration   Education - Topic ST provided patient education regarding role of ST, purpose of assessment, clinical impressions, goals of treatment, and plan of care. Patient verbalized questionable full comprehension, consistent with cognitive status. Education will be reinforced. ST further coordinated with RN regarding recommendations and precautions per this assessment, with RN verbalizing understanding.     Outcome    Education Needs: , Continued instruction, Review/reinforcement, Education Goals: , and Unable to meet

## 2025-02-06 NOTE — PROGRESS NOTES
"Clive Woody is a 89 y.o. male on day 1 of admission presenting with Pneumonia, unspecified organism.    Subjective     No overnight events reported.     Patient is on room air this morning with SpO2 94%. He does not appear to be in any distress. He is not eating much but is drinking fluids. SLP jazmyne completed, recommending pureed diet with thin liquids for now. NS @ 75 ml/hr was started today for BARBARA, will continue to monitor renal function.            Objective     Physical Exam  Constitutional:       General: He is not in acute distress.  HENT:      Head: Normocephalic and atraumatic.      Mouth/Throat:      Mouth: Mucous membranes are dry.   Eyes:      Conjunctiva/sclera: Conjunctivae normal.   Cardiovascular:      Rate and Rhythm: Normal rate and regular rhythm.   Pulmonary:      Effort: No respiratory distress.      Breath sounds: Rhonchi and rales present. No wheezing.      Comments: On room air, SpO2 94%  Abdominal:      General: Bowel sounds are normal. There is no distension.      Palpations: Abdomen is soft.      Tenderness: There is no abdominal tenderness.   Musculoskeletal:         General: Swelling (trace ankle edema bilaterally) present.   Skin:     General: Skin is warm and dry.   Neurological:      Mental Status: He is alert. Mental status is at baseline.      Comments: Nonverbal (baseline)         Last Recorded Vitals  Blood pressure 138/90, pulse 72, temperature 37.3 °C (99.1 °F), temperature source Temporal, resp. rate 19, height 1.727 m (5' 8\"), weight 83.9 kg (185 lb), SpO2 92%.  Intake/Output last 3 Shifts:  I/O last 3 completed shifts:  In: 775 (9.2 mL/kg) [I.V.:475 (5.7 mL/kg); IV Piggyback:300]  Out: - (0 mL/kg)   Weight: 83.9 kg     Relevant Results          Scheduled medications  atorvastatin, 5 mg, oral, Daily  azithromycin, 500 mg, oral, q24h MARTIN  bisacodyl, 10 mg, rectal, Daily  busPIRone, 5 mg, oral, BID  cefTRIAXone, 1 g, intravenous, q24h  cholecalciferol, 5,000 Units, oral, " Daily  docusate sodium, 100 mg, oral, BID  empagliflozin, 10 mg, oral, Daily  enoxaparin, 40 mg, subcutaneous, q24h  finasteride, 5 mg, oral, Daily  glipiZIDE, 5 mg, oral, Daily  insulin glargine, 10 Units, subcutaneous, Nightly  insulin lispro, 0-10 Units, subcutaneous, TID AC  ipratropium-albuteroL, 3 mL, nebulization, TID  levothyroxine, 88 mcg, oral, Daily  [Held by provider] lisinopril, 10 mg, oral, Daily  LORazepam, 0.5 mg, oral, Daily before lunch  [Held by provider] metFORMIN, 1,000 mg, oral, BID  OXcarbazepine, 300 mg, oral, BID  oxygen, , inhalation, Continuous - Inhalation  pantoprazole, 40 mg, oral, Daily before breakfast   Or  pantoprazole, 40 mg, intravenous, Daily before breakfast  polyethylene glycol, 17 g, oral, Daily  sennosides, 1 tablet, oral, Daily  tamsulosin, 0.4 mg, oral, Daily      Continuous medications  sodium chloride 0.9%, 75 mL/hr, Last Rate: 75 mL/hr (02/06/25 0918)      PRN medications  PRN medications: acetaminophen **OR** acetaminophen **OR** acetaminophen, acetaminophen **OR** acetaminophen **OR** acetaminophen, albuterol, alum-mag hydroxide-simeth, benzocaine-menthol, dextromethorphan-guaifenesin, dextrose, dextrose, glucagon, glucagon, guaiFENesin, melatonin, ondansetron ODT **OR** ondansetron    Results for orders placed or performed during the hospital encounter of 02/04/25 (from the past 24 hours)   POCT GLUCOSE   Result Value Ref Range    POCT Glucose 182 (H) 74 - 99 mg/dL   POCT GLUCOSE   Result Value Ref Range    POCT Glucose 189 (H) 74 - 99 mg/dL   CBC   Result Value Ref Range    WBC 15.4 (H) 4.4 - 11.3 x10*3/uL    nRBC 0.0 0.0 - 0.0 /100 WBCs    RBC 3.91 (L) 4.50 - 5.90 x10*6/uL    Hemoglobin 10.9 (L) 13.5 - 17.5 g/dL    Hematocrit 34.5 (L) 41.0 - 52.0 %    MCV 88 80 - 100 fL    MCH 27.9 26.0 - 34.0 pg    MCHC 31.6 (L) 32.0 - 36.0 g/dL    RDW 15.7 (H) 11.5 - 14.5 %    Platelets 235 150 - 450 x10*3/uL   Basic metabolic panel   Result Value Ref Range    Glucose 113 (H) 74 -  99 mg/dL    Sodium 140 136 - 145 mmol/L    Potassium 3.9 3.5 - 5.3 mmol/L    Chloride 107 98 - 107 mmol/L    Bicarbonate 26 21 - 32 mmol/L    Anion Gap 11 10 - 20 mmol/L    Urea Nitrogen 44 (H) 6 - 23 mg/dL    Creatinine 1.63 (H) 0.50 - 1.30 mg/dL    eGFR 40 (L) >60 mL/min/1.73m*2    Calcium 7.8 (L) 8.6 - 10.3 mg/dL   B-Type Natriuretic Peptide   Result Value Ref Range     (H) 0 - 99 pg/mL   POCT GLUCOSE   Result Value Ref Range    POCT Glucose 91 74 - 99 mg/dL     CT head wo IV contrast    Result Date: 2/5/2025  Interpreted By:  Osamn Maria, STUDY: CT HEAD WO IV CONTRAST;  2/5/2025 12:40 pm   INDICATION: Signs/Symptoms:Altered mental status also septic..     COMPARISON: 11/19/2020   ACCESSION NUMBER(S): [id number]   ORDERING CLINICIAN: CANDICE LOERA   TECHNIQUE: Noncontrast axial CT scan of head was performed. Angled reformats in brain and bone windows were generated. The images were reviewed in bone, brain, blood and soft tissue windows.   FINDINGS: The ventricles, cisterns and sulci are prominent, consistent with mild diffuse volume loss. There are areas of nonspecific white matter hypodensity, which are probably age-related or microvascular in nature.   Gray-white differentiation is intact and there is no evidence of acute cortical infarct. No mass, mass effect or midline shift is seen. There is no evidence of hemorrhage.   The visualized paranasal sinuses are remarkable for likely minimal mucosal thickening or fluid in the left maxillary sinus.         No evidence of acute cortical infarct or intracranial hemorrhage.   Minimal mucosal thickening in the left maxillary sinus.   MACRO: None   Signed by: Osman Maria 2/5/2025 1:07 PM Dictation workstation:   [id number]    ECG 12 lead    Result Date: 2/5/2025  Sinus rhythm with 1st degree AV block Nonspecific T wave abnormality Abnormal ECG When compared with ECG of 29-MAR-2022 21:20, RI interval has increased T wave inversion now evident in Inferior  leads    CT chest abdomen pelvis w IV contrast    Result Date: 2/5/2025  Interpreted By:  Rell Alfonso, STUDY: CT CHEST ABDOMEN PELVIS W IV CONTRAST;  2/4/2025 11:00 pm   INDICATION: Signs/Symptoms:vomiting   COMPARISON: CT abdomen pelvis 03/29/2022.   ACCESSION NUMBER(S): [id number]   ORDERING CLINICIAN: DEION RODRIGUES   TECHNIQUE: CT of the chest, abdomen, and pelvis was performed. Contiguous axial images were obtained through the chest, abdomen and pelvis. Coronal and sagittal reconstructions were performed.   The images were obtained in the portal venous phase.   75 ml of contrast material Omnipaque 350 were administered intravenously without immediate complication.   FINDINGS: CHEST:   LUNG/PLEURA/LARGE AIRWAYS: There are no pleural effusions. There is no evidence of pneumothorax. There are no consolidations. The tracheobronchial tree is patent.   There are bibasilar clustered ground-glass opacities suggestive of infectious or inflammatory bronchiolitis.   VESSELS: No traumatic aortic injury is appreciated within the limitations of this non-EKG gated study.  The thoracic aorta is of normal course and caliber.   Main pulmonary artery and its branches are normal in caliber.   HEART: The heart is normal in size.   There is no pericardial effusion.   MEDIASTINUM AND BRODERICK: No pneumomediastinum, abnormal mediastinal fluid collection or mediastinal hematoma is appreciated. No mediastinal, hilar or axillary adenopathy is present.   Esophagus is mildly distended with debris.   CHEST WALL AND LOWER NECK: No acute fracture or dislocation of the included osseous structures are appreciated.  No suspicious osseous lesions are identified.  The thoracic wall soft tissues are within normal limits.   ABDOMEN:   LIVER: The liver is normal in size and contour. There is no subcapsular hematoma, no perihepatic fluid collection.  There are no suspicious hepatic lesions.   GALLBLADDER: The gallbladder is nondistended, without evidence  of radiopaque stone.   BILE DUCTS: The intahepatic and extrahepatic bile ducts are not dilated.   PANCREAS: Mild dilatation of the pancreatic duct measuring up to 6 mm in caliber new from prior, however no evidence of peripancreatic edema or mass.   SPLEEN: No parenchymal perfusion deficit of the spleen is appreciated to suggest contusion or laceration. There is no subcapsular hematoma, no perisplenic fluid collection.   ADRENAL GLANDS: The bilateral adrenal glands are unremarkable in appearance.   KIDNEYS AND URETERS: No parenchymal perfusion deficit is appreciated in bilateral kidneys to suggest contusion or laceration. There is no subcapsular hematoma, no perinephric fluid collection. The kidneys are normal in size. There is no hydronephrosis. There is no nephrolithiasis. 1.5 cm right upper pole renal cyst similar to prior.   PELVIS:   BLADDER: The urinary bladder is grossly normal.   REPRODUCTIVE ORGANS: Enlarged prostate measuring 5.2 x 6.5 cm axially protruding into the bladder apex.   BOWEL: Small hiatal hernia. The stomach is unremarkable.  The small bowel is normal in caliber without evidence of focal wall thickening or obstruction.  There is no evidence of focal wall thickening or dilatation of the large bowel.  Moderate to large amount of formed stool throughout the colon and rectum without evidence of wall thickening or inflammatory change. Appendix not visualized without secondary signs of appendicitis.   VESSELS: The aorta and IVC are within normal limits.  The principal vasculature of the abdomen and pelvis is patent.   PERITONEUM/RETROPERITONEUM/LYMPH NODES: There is no evidence of intra- or retroperitoneal hematoma.  There is no free or loculated fluid collection, no free intraperitoneal air. No abdominopelvic lymphadenopathy is present.   BONES AND ABDOMINAL WALL: No evidence of acute fracture or dislocation of the included osseous structures.  No suspicious osseous lesions are identified.  There  is a right inguinal hernia containing small bowel new from prior, however the bowel is collapsed without evidence of incarceration or obstruction.       CHEST 1.  Clustered ground-glass opacities in the bilateral lower lobes suspicious for acute infectious or inflammatory bronchiolitis. Distended thoracic esophagus filled with debris.   ABDOMEN - PELVIS 1.  Evaluation of lower abdomen significantly limited by motion artifact. There is moderate to large amount of formed stool throughout the colon rectum without evidence of wall thickening or inflammatory change. Correlation with constipation is recommended. Right inguinal hernia containing loops of small bowel new from 2022, however without bowel obstruction or incarceration. 2. Marked prostatomegaly. 3. Mild dilatation of the pancreatic duct without peripancreatic edema is nonspecific and correlation with pancreatic enzymes is recommended.     Signed by: Rell Alfonso 2/5/2025 12:26 AM Dictation workstation:   [id number]                  Assessment/Plan   Assessment & Plan  Pneumonia, unspecified organism    Dementia    Other schizophrenia    Benign hypertension    Diabetes mellitus without complication (Multi)    ASHD (arteriosclerotic heart disease)    Benign prostatic hyperplasia without urinary obstruction    Acute hypoxic respiratory failure (Multi)    Fever    Nausea & vomiting    Constipation    Acute cystitis without hematuria    Hypothyroidism    Vitamin D deficiency    Anxiety    BARBARA (acute kidney injury) (CMS-[facility])    #Acute hypoxic respiratory failure, resolved   #Pneumonia, community acquired  #Fever, resolved   - Brought to ED after episode of emesis at [facility]  - TMax 39.1C in ED, Tylenol suppository given with improvement  - Required 3L O2 in the ED, not on home O2  - CT chest: Clustered ground-glass opacities in the bilateral lower lobes   suspicious for acute infectious or inflammatory bronchiolitis. Distended thoracic esophagus filled with debris.    - WBC 17.2 > 15.4   - COVID, flu negative   - MRSA nares negative   - Procal, BCx pending  - Sputum culture if able   - Continue azithromycin and ceftriaxone (day 2)- no recent hospital admissions   - DuoNebs TID  - Tylenol PRN for fever   - RT eval and treat protocol  - Bronchial hygiene  - Monitor SpO2 continuously   - Baseline O2 None   - Wean O2 as tolerated; patient currently on RA  - SLP consulted, recommending pureed diet/regular thin liquids      #Nausea and vomiting, resolved  #Constipation  - CT A/P:  Evaluation of lower abdomen significantly limited by motion   artifact. There is moderate to large amount of formed stool   throughout the colon rectum without evidence of wall thickening or   inflammatory change. Correlation with constipation is recommended.   Right inguinal hernia containing loops of small bowel new from 2022,   however without bowel obstruction or incarceration.   - No emesis since admission  - Continue Colace 100 mg BID, Miralax 17 g every day  - Continue Senokot 8.6 mg at bedtime   - Bisacodyl 10 mg suppository every day   - Diet as tolerated  - Zofran PRN for nausea/vomiting   - Monitor for BM     #BARBARA  - Cr 1.20 > 1.63 today  - Patient did receive IV contrast and Toradol in the ED on 2/5   - Lisinopril held today  - Start NS @ 75 ml/hr  - Encourage PO intake as tolerated   - Renally dose meds  - Daily BMP    #UTI  - UA: 250 leuk esterase, 21-50 WBCs, 6-10 RBCs, budding yeast  - UCx, BCx pending  - Received Zosyn in the ED  - Continue ceftriaxone (day 2)  - Adjust ATBs as needed pending final culture results     #HLD  #HTN  - Trop 8  - Continue atorvastatin 5 mg every day  - Lisinopril held 2/2 BARBARA; BP well controlled today 138/90  - Telemetry monitoring  - Monitor BP     #Hypothyroidism   - Continue levothyroxine 88 mcg every day     #T2DM  - Continue empagliflozin 10 mg every day, glipizide 5 mg every day, Lantus 10 units at bedtime  - Metformin held; patient received IV contrast on  2/5   - SSI three times a day before meals   - Hypoglycemia protocol  - Accuchecks ac/hs/prn  - Diabetic diet      #BPH  - Continue tamsulosin 0.4 mg every day, finasteride 5 mg every day  - Bladder scan PRN     #Schizophrenia  #Anxiety  #Dementia    - Continue buspirone 5 mg BID, lorazepam 0.5 mg every day, oxcarbazepine 300 mg BID   - Delirium precautions: Frequent reorientation, day/night normalization, shades open for sunlight, provide glasses/hearing aids as needed  - From [facility]; likely return on discharge   - PT/OT evals      #Vitamin D deficiency   - Continue cholecalciferol 5000 units every day     DVT PPx: Lovenox   GI PPx: Protonix   Diet: Consistent carb     Code Status: Full      Disposition: Patient requires inpatient management at this time.          Zainab Khoury PA-C

## 2025-02-06 NOTE — CARE PLAN
The patient's goals for the shift include      The clinical goals for the shift include will eat 25% of all 3 meals    Goal not met. Patient will only drink nfluids. Patient will not take in any type of food we have offered. Patient started on IV hydration and IV ATB will continue.

## 2025-02-06 NOTE — PROGRESS NOTES
Physical Therapy                 Therapy Communication Note    Patient Name: Clive Woody  MRN: [id number]  Department: [facility]  [facility]  Today's Date: 2/6/2025     Discipline: Physical Therapy          Missed Visit Reason: Missed Visit Reason: Patient sleeping Unable to arouse pt. for evaluation.     Missed Time: Attempt 1055

## 2025-02-06 NOTE — PROGRESS NOTES
Occupational Therapy                    Therapy Communication Note    Patient Name: Clive Woody  MRN: [id number]  Department: [facility]  Room: [address] [place], [place] [address]  Today's Date: 2/6/2025     Discipline: Occupational Therapy    OT Missed Visit: Yes     Missed Visit Reason: Missed Visit Reason: Patient sleeping (Pt unable to be aroused for therapy evaluation. Per RN, pt has also been agitated and refusing medication this date. Will follow up per pt status and as schedule allows.)    Missed Time: Attempt 1051

## 2025-02-07 LAB
ANION GAP SERPL CALC-SCNC: 11 MMOL/L (ref 10–20)
BACTERIA UR CULT: ABNORMAL
BUN SERPL-MCNC: 31 MG/DL (ref 6–23)
CALCIUM SERPL-MCNC: 8.1 MG/DL (ref 8.6–10.3)
CHLORIDE SERPL-SCNC: 110 MMOL/L (ref 98–107)
CO2 SERPL-SCNC: 24 MMOL/L (ref 21–32)
CREAT SERPL-MCNC: 1.16 MG/DL (ref 0.5–1.3)
EGFRCR SERPLBLD CKD-EPI 2021: 59 ML/MIN/1.73M*2
ERYTHROCYTE [DISTWIDTH] IN BLOOD BY AUTOMATED COUNT: 15.6 % (ref 11.5–14.5)
GLUCOSE BLD MANUAL STRIP-MCNC: 149 MG/DL (ref 74–99)
GLUCOSE BLD MANUAL STRIP-MCNC: 152 MG/DL (ref 74–99)
GLUCOSE BLD MANUAL STRIP-MCNC: 182 MG/DL (ref 74–99)
GLUCOSE BLD MANUAL STRIP-MCNC: 76 MG/DL (ref 74–99)
GLUCOSE BLD MANUAL STRIP-MCNC: 97 MG/DL (ref 74–99)
GLUCOSE SERPL-MCNC: 75 MG/DL (ref 74–99)
HCT VFR BLD AUTO: 35.7 % (ref 41–52)
HGB BLD-MCNC: 11.1 G/DL (ref 13.5–17.5)
MAGNESIUM SERPL-MCNC: 2.14 MG/DL (ref 1.6–2.4)
MCH RBC QN AUTO: 27.7 PG (ref 26–34)
MCHC RBC AUTO-ENTMCNC: 31.1 G/DL (ref 32–36)
MCV RBC AUTO: 89 FL (ref 80–100)
NRBC BLD-RTO: 0 /100 WBCS (ref 0–0)
PLATELET # BLD AUTO: 223 X10*3/UL (ref 150–450)
POTASSIUM SERPL-SCNC: 4.1 MMOL/L (ref 3.5–5.3)
RBC # BLD AUTO: 4.01 X10*6/UL (ref 4.5–5.9)
SODIUM SERPL-SCNC: 141 MMOL/L (ref 136–145)
WBC # BLD AUTO: 12.3 X10*3/UL (ref 4.4–11.3)

## 2025-02-07 PROCEDURE — 2500000002 HC RX 250 W HCPCS SELF ADMINISTERED DRUGS (ALT 637 FOR MEDICARE OP, ALT 636 FOR OP/ED): Mod: IPSPLIT

## 2025-02-07 PROCEDURE — 2500000001 HC RX 250 WO HCPCS SELF ADMINISTERED DRUGS (ALT 637 FOR MEDICARE OP): Mod: IPSPLIT

## 2025-02-07 PROCEDURE — 83735 ASSAY OF MAGNESIUM: CPT | Mod: IPSPLIT

## 2025-02-07 PROCEDURE — 2500000001 HC RX 250 WO HCPCS SELF ADMINISTERED DRUGS (ALT 637 FOR MEDICARE OP): Mod: IPSPLIT | Performed by: NURSE PRACTITIONER

## 2025-02-07 PROCEDURE — 97165 OT EVAL LOW COMPLEX 30 MIN: CPT | Mod: GO,IPSPLIT

## 2025-02-07 PROCEDURE — 2500000004 HC RX 250 GENERAL PHARMACY W/ HCPCS (ALT 636 FOR OP/ED): Mod: IPSPLIT | Performed by: NURSE PRACTITIONER

## 2025-02-07 PROCEDURE — 2500000004 HC RX 250 GENERAL PHARMACY W/ HCPCS (ALT 636 FOR OP/ED): Mod: IPSPLIT

## 2025-02-07 PROCEDURE — 99232 SBSQ HOSP IP/OBS MODERATE 35: CPT | Performed by: NURSE PRACTITIONER

## 2025-02-07 PROCEDURE — 85027 COMPLETE CBC AUTOMATED: CPT | Mod: IPSPLIT

## 2025-02-07 PROCEDURE — 97162 PT EVAL MOD COMPLEX 30 MIN: CPT | Mod: GP,IPSPLIT | Performed by: PHYSICAL THERAPIST

## 2025-02-07 PROCEDURE — 82947 ASSAY GLUCOSE BLOOD QUANT: CPT | Mod: IPSPLIT

## 2025-02-07 PROCEDURE — 36415 COLL VENOUS BLD VENIPUNCTURE: CPT | Mod: IPSPLIT

## 2025-02-07 PROCEDURE — 94760 N-INVAS EAR/PLS OXIMETRY 1: CPT | Mod: IPSPLIT

## 2025-02-07 PROCEDURE — 92526 ORAL FUNCTION THERAPY: CPT | Mod: GN,IPSPLIT | Performed by: SPEECH-LANGUAGE PATHOLOGIST

## 2025-02-07 PROCEDURE — 80048 BASIC METABOLIC PNL TOTAL CA: CPT | Mod: IPSPLIT

## 2025-02-07 PROCEDURE — 94640 AIRWAY INHALATION TREATMENT: CPT | Mod: IPSPLIT

## 2025-02-07 PROCEDURE — 1200000002 HC GENERAL ROOM WITH TELEMETRY DAILY: Mod: IPSPLIT

## 2025-02-07 RX ORDER — LISINOPRIL 10 MG/1
10 TABLET ORAL DAILY
Status: COMPLETED | OUTPATIENT
Start: 2025-02-07 | End: 2025-02-07

## 2025-02-07 RX ORDER — IPRATROPIUM BROMIDE AND ALBUTEROL SULFATE 2.5; .5 MG/3ML; MG/3ML
3 SOLUTION RESPIRATORY (INHALATION) EVERY 2 HOUR PRN
Status: DISCONTINUED | OUTPATIENT
Start: 2025-02-07 | End: 2025-02-09 | Stop reason: HOSPADM

## 2025-02-07 RX ORDER — SODIUM CHLORIDE 9 MG/ML
75 INJECTION, SOLUTION INTRAVENOUS CONTINUOUS
Status: DISCONTINUED | OUTPATIENT
Start: 2025-02-07 | End: 2025-02-07

## 2025-02-07 RX ADMIN — CEFTRIAXONE 1 G: 1 INJECTION, SOLUTION INTRAVENOUS at 13:06

## 2025-02-07 RX ADMIN — BISACODYL 10 MG: 10 SUPPOSITORY RECTAL at 08:28

## 2025-02-07 RX ADMIN — LISINOPRIL 10 MG: 10 TABLET ORAL at 19:51

## 2025-02-07 RX ADMIN — ENOXAPARIN SODIUM 40 MG: 40 INJECTION SUBCUTANEOUS at 12:50

## 2025-02-07 RX ADMIN — OXCARBAZEPINE 300 MG: 150 TABLET, FILM COATED ORAL at 08:16

## 2025-02-07 RX ADMIN — SODIUM CHLORIDE 75 ML/HR: 9 INJECTION, SOLUTION INTRAVENOUS at 08:40

## 2025-02-07 RX ADMIN — AZITHROMYCIN MONOHYDRATE 500 MG: 500 INJECTION, POWDER, LYOPHILIZED, FOR SOLUTION INTRAVENOUS at 14:10

## 2025-02-07 RX ADMIN — LORAZEPAM 0.5 MG: 0.5 TABLET ORAL at 13:06

## 2025-02-07 RX ADMIN — IPRATROPIUM BROMIDE AND ALBUTEROL SULFATE 3 ML: .5; 3 SOLUTION RESPIRATORY (INHALATION) at 15:37

## 2025-02-07 RX ADMIN — POLYETHYLENE GLYCOL 3350 17 G: 17 POWDER, FOR SOLUTION ORAL at 08:15

## 2025-02-07 RX ADMIN — EMPAGLIFLOZIN 10 MG: 10 TABLET, FILM COATED ORAL at 08:18

## 2025-02-07 RX ADMIN — LEVOTHYROXINE SODIUM 88 MCG: 88 TABLET ORAL at 08:18

## 2025-02-07 ASSESSMENT — ACTIVITIES OF DAILY LIVING (ADL)
ADL_ASSISTANCE: NEEDS ASSISTANCE
ADL_ASSISTANCE: NEEDS ASSISTANCE
BATHING_ASSISTANCE: MAXIMAL

## 2025-02-07 ASSESSMENT — COGNITIVE AND FUNCTIONAL STATUS - GENERAL
WALKING IN HOSPITAL ROOM: TOTAL
DRESSING REGULAR UPPER BODY CLOTHING: TOTAL
HELP NEEDED FOR BATHING: TOTAL
STANDING UP FROM CHAIR USING ARMS: TOTAL
TOILETING: TOTAL
MOVING FROM LYING ON BACK TO SITTING ON SIDE OF FLAT BED WITH BEDRAILS: A LITTLE
DRESSING REGULAR LOWER BODY CLOTHING: TOTAL
WALKING IN HOSPITAL ROOM: TOTAL
MOVING FROM LYING ON BACK TO SITTING ON SIDE OF FLAT BED WITH BEDRAILS: A LOT
DRESSING REGULAR LOWER BODY CLOTHING: A LOT
CLIMB 3 TO 5 STEPS WITH RAILING: TOTAL
TOILETING: TOTAL
EATING MEALS: TOTAL
HELP NEEDED FOR BATHING: TOTAL
TURNING FROM BACK TO SIDE WHILE IN FLAT BAD: A LOT
TOILETING: TOTAL
TURNING FROM BACK TO SIDE WHILE IN FLAT BAD: A LOT
MOBILITY SCORE: 8
CLIMB 3 TO 5 STEPS WITH RAILING: TOTAL
PERSONAL GROOMING: A LOT
CLIMB 3 TO 5 STEPS WITH RAILING: TOTAL
EATING MEALS: A LOT
DRESSING REGULAR UPPER BODY CLOTHING: A LOT
DAILY ACTIVITIY SCORE: 6
DAILY ACTIVITIY SCORE: 11
WALKING IN HOSPITAL ROOM: TOTAL
MOBILITY SCORE: 6
STANDING UP FROM CHAIR USING ARMS: TOTAL
MOVING FROM LYING ON BACK TO SITTING ON SIDE OF FLAT BED WITH BEDRAILS: TOTAL
HELP NEEDED FOR BATHING: A LOT
PERSONAL GROOMING: TOTAL
EATING MEALS: TOTAL
DRESSING REGULAR LOWER BODY CLOTHING: TOTAL
PERSONAL GROOMING: TOTAL
STANDING UP FROM CHAIR USING ARMS: TOTAL
MOVING TO AND FROM BED TO CHAIR: TOTAL
DRESSING REGULAR UPPER BODY CLOTHING: TOTAL
DAILY ACTIVITIY SCORE: 6
MOVING TO AND FROM BED TO CHAIR: A LOT
TURNING FROM BACK TO SIDE WHILE IN FLAT BAD: TOTAL
MOBILITY SCORE: 10
MOVING TO AND FROM BED TO CHAIR: TOTAL

## 2025-02-07 ASSESSMENT — PAIN SCALES - PAIN ASSESSMENT IN ADVANCED DEMENTIA (PAINAD)
BODYLANGUAGE: RELAXED
TOTALSCORE: 0
BREATHING: NORMAL
BREATHING: NORMAL
FACIALEXPRESSION: SMILING OR INEXPRESSIVE
CONSOLABILITY: NO NEED TO CONSOLE

## 2025-02-07 ASSESSMENT — PAIN SCALES - WONG BAKER: WONGBAKER_NUMERICALRESPONSE: NO HURT

## 2025-02-07 ASSESSMENT — PAIN - FUNCTIONAL ASSESSMENT
PAIN_FUNCTIONAL_ASSESSMENT: WONG-BAKER FACES
PAIN_FUNCTIONAL_ASSESSMENT: 0-10

## 2025-02-07 ASSESSMENT — PAIN SCALES - GENERAL
PAINLEVEL_OUTOF10: 0 - NO PAIN
PAINLEVEL_OUTOF10: 0 - NO PAIN

## 2025-02-07 NOTE — PROGRESS NOTES
Speech-Language Pathology    SLP Adult Inpatient  Speech-Language Pathology Treatment     Patient Name: Clive Woody  MRN: [id number]  Today's Date: 2/7/2025  Time Calculation  Start Time: 1210  Stop Time: 1225  Time Calculation (min): 15 min     Current Problem:   1. Pneumonia, unspecified organism        2. Nausea and vomiting, unspecified vomiting type        3. Pneumonia of both lower lobes due to infectious organism        4. Hypoxia        5. Oropharyngeal dysphagia        Current Problem:   Assessed dysphagia     Subjective   Pt was resistant to oral intake, turning head away and limited response to presentation of holding spoon.      Pain Assessment:  The pt was non-verbal, there was no pain indicated impacting therapy session.    Objective:  Pt seen during lunch meal which consisted of puree solids and thin liquids. The pt was resistant to oral intake. He was more acceptable of liquid and drank 90% of lemonade and 75% of nutrition drink. He refused all presented puree trials from meal, but did accept 4 bites of sherbet. The pt demonstrated an overall inconsistent cough consistent with PN, but dysphagia CN be ruled out due to the limited amount of trials.     SLP called the nursing home who confirmed that this pt's baseline diet is Regular (IDDSI Level 7) consistency solids and Thin (IDDSI Level 0) liquids, foods are cut for the pt.    Recommend pt continue current diet level and SLP will plan to con't following this pt and continue further assessment.      Recommendations:  Continue current diet level, max setup and assist.      Plan:  SLP TX Plan: Continue Plan of Care    Goals:   Long term goal:  1. Pt will maintain adequate hydration/nutrition (monitored via lab values) with optimal safety and efficiency via PO intake on least restrictive diet without evidence of pulmonary compromise or infection development (monitored via temp/WBC).    *progressing    Short term goals:  1. Pt will demonstrate  understanding/carryover of compensatory safe swallow strategies (slow rate, small bites/sips, alternating consistencies) in 90% of opportunities with min   verbal cues     *no progress made    2. Pt will participate in continued assessment of swallow safety and efficiency in order to further inform POC and establish least restrictive diet.     *progressing     GOAL START:   2/6/2025         TIME FRAME: 2 weeks              GOAL END:  2/20/25      Education:  Learner Patient    Barriers to Learning None   Method Verbal and Demonstration   Education - Topic ST provided patient with oral trials, cues/prompts   Outcome    Limited pt response  Education Goals: Partially met.

## 2025-02-07 NOTE — PROGRESS NOTES
Physical Therapy    Physical Therapy Evaluation    Patient Name: Clive Woody  MRN: [id number]  Department: [facility]  Room: [address] [place] [address]  Today's Date: 2/7/2025   Time Calculation  Start Time: 1000  Stop Time: 1030  Time Calculation (min): 30 min    Assessment/Plan   PT Assessment  PT Assessment Results: Decreased strength, Decreased range of motion, Decreased endurance, Impaired balance, Decreased mobility, Decreased coordination, Decreased cognition, Impaired judgement, Decreased safety awareness  Rehab Prognosis: Fair  Barriers to Discharge Home: Cognition needs  Cognition Needs: 24hr supervision for safety awareness needed, Medication and/or medical management daily assist needed, Cognition-related high falls risk  Evaluation/Treatment Tolerance:  (Pt with no response verbal or visual with evaluation.  Appears that he tolerated what we were able to do without undue stress.)  Medical Staff Made Aware: Yes  Strengths: Living arrangement secure  Barriers to Participation: Comorbidities, Insight into problems, Language, Premorbid level of function  End of Session Communication: Bedside nurse  Assessment Comment: 89 M with pneumonia, UTI.  Demonstrating impaired mobility and stregnth.  Pt is non verbal and does not respond or give any indication that he understands what is being asked of him.  He was mod a with bed mobility but suprisingly did a bridge when we wer trying to move him up in bed.  The pt will need 24 hr care / mod level of PT services.  Expect return to the ECF  End of Session Patient Position: Bed, 2 rail up, Alarm on  IP OR SWING BED PT PLAN  Inpatient or Swing Bed: Inpatient  PT Plan  Treatment/Interventions: Bed mobility, Transfer training, Gait training, Balance training, Strengthening, Endurance training, Range of motion, Therapeutic exercise, Therapeutic activity  PT Plan: Ongoing PT  PT Frequency: 3 times per week  PT Discharge Recommendations: Moderate intensity level of continued care  PT  Recommended Transfer Status: Assist x2 (MOD/ Max)  PT - OK to Discharge: Yes    Subjective   General Visit Information:  General  Reason for Referral: Impaired Mobility 2/2 Pneumonia and UTI  Referred By: Zainab Khoury PA-C  Past Medical History Relevant to Rehab: Dementia, hypertension, acute hypoxic respiratory failure, hypothyroidism, anxiety, asthnia, BARBARA, DM, Abcess, Agitation, Aphasia, Falls, BPHPsychosis, Seizures, Schizo  Family/Caregiver Present: No  Prior to Session Communication: Bedside nurse (Ok to see the patient.  Reports that he  is non verbal and does not respond well to directions.  Has been agitated and scared.)  Patient Position Received: Alarm on, Bed, 2 rail up  Preferred Learning Style: Other:(comment) (Pt not responding to verbal,  Does not verbalize.  Tried Hand signals with commands.  Pt did not follow.)  General Comment: 89 M Awake, Non verbal, Not responding to questions or commands.  Not following directions for testing.  Home Living:  Home Living  Type of Home: Skilled Nursing facility  Lives With: Other (Comment) (At a facility)  Home Adaptive Equipment:  (Unknown)  Home Living Comments: Pt lives in a SNF.  Per the nurse the patient has some responses to the staff that work with him.  He can do transfers with the right cueing  Prior Level of Function:  Prior Function Per Pt/Caregiver Report  Level of [place]: Needs assistance with ADLs, Needs assistance with homemaking, Needs assistance with functional transfers  Receives Help From: Other (Comment) (Staff)  ADL Assistance: Needs assistance  Precautions:  Precautions  Hearing/Visual Limitations: Unknown  No glasses present.  Pt does not respond or act like he has heard what i said  Medical Precautions: Fall precautions, Infection precautions, Seizure precautions, Swallowing precautions  Precautions Comment: Hx Seizures, Safety precautions.      Date/Time Vitals Session Patient Position Pulse Resp SpO2 BP MAP (mmHg)    02/07/25  1000 During PT  Lying  90  --  95 %  --  --              Objective   Pain:  Pain Assessment  Pain Assessment: 0-10  0-10 (Numeric) Pain Score: 0 - No pain (Pt does not answer to questions of pain.  Pt does not make any facial or other pain response with prom testing.)  Cognition:  Cognition  Overall Cognitive Status: Impaired at baseline  Orientation Level: Unable to assess  Following Commands: Other (Comment) (Does not follow any verbal or hand signal commands)  Attention: Exceptions to WFL (Pt does not look at me when calling his name or trying to get his attention.)    General Assessments:  General Observation  General Observation: 89 M who is non verbal and does not respond to questions or hand signals.  Not looking at the therapist when the therapist is talking to him.  It is hard to know what he understands.  He does not follow any commands.     Sensation  Light Touch: No apparent deficits (Pt responded by looking at the arm , hand, or leg when being touched)    Strength  Strength Comments: Unable to accurately assess stregnth.  Pt was passive range of motion For B UE and B LE's  But did show active hip flexion to bend the knees up while lying in bed.  He also demonstrated a bridge with out being asked to do so.  Also, Held the knee in ext for 1 sec on the left but not on the right.  Would not follow directions for MMT.  Primarily PROM with an occasional hold.  Strength  Strength Comments: Unable to accurately assess stregnth.  Pt was passive range of motion For B UE and B LE's  But did show active hip flexion to bend the knees up while lying in bed.  He also demonstrated a bridge with out being asked to do so.  Also, Held the knee in ext for 1 sec on the left but not on the right.  Would not follow directions for MMT.  Primarily PROM with an occasional hold.     Coordination  Movements are Fluid and Coordinated: No  Coordination Comment: Not following directions for testing    Postural Control  Postural  Control: Impaired  Posture Comment: Pt able to hold himself up while sitting edge of bed but loses balance to the left    Static Sitting Balance  Static Sitting-Balance Support: Feet supported  Static Sitting-Level of Assistance: Moderate assistance (1-2 initially mod a.  Then could hold self for brief time)  Static Sitting-Comment/Number of Minutes: ~ 5 seconds on his own before falling to the left to get back into the bed    Static Standing Balance  Static Standing-Balance Support:  (Unable)  Dynamic Standing Balance  Dynamic Standing-Balance Support:  (Unable)  Functional Assessments:  Bed Mobility  Bed Mobility: Yes  Bed Mobility 1  Bed Mobility 1: Rolling left, Side lying left to sit  Level of Assistance 1: Moderate assistance, Maximum verbal cues, +2, Moderate tactile cues  Bed Mobility Comments 1: Explained eveything we were doing to the patient.  He was not able to follow commands.  Required 2 people mod a  Bed Mobility 2  Bed Mobility  2: Sitting to supine  Level of Assistance 2: Moderate assistance, Moderate verbal cues, +2  Bed Mobility Comments 2: Pt wanted to lay back down and started the procedure on his own by starting to lie down and draw his legs up.  Bed Mobility 3  Bed Mobility 3:  (Up in bed with Max 2/ draw sheet)    Transfers  Transfer: No    Ambulation/Gait Training  Ambulation/Gait Training Performed: No  Extremity/Trunk Assessments:  RUE   RUE :  (PROM wfl)  LUE   LUE:  (PROM WFL.  Demo's 1 episode of holding the arm up on his own for a brief second.)  RLE   RLE :  (PROM WFL.  Demo's pulling B legs up While lying in bed.)  LLE   LLE :  (PROM WFL)  Outcome Measures:  [facility] Basic Mobility  Turning from your back to your side while in a flat bed without using bedrails: A lot  Moving from lying on your back to sitting on the side of a flat bed without using bedrails: A lot  Moving to and from bed to chair (including a wheelchair): Total  Standing up from a chair using your arms (e.g. wheelchair  or bedside chair): Total  To walk in hospital room: Total  Climbing 3-5 steps with railing: Total  Basic Mobility - Total Score: 8    Encounter Problems       Encounter Problems (Active)       Balance       STG - Maintains static sitting balance with upper extremity support for 5 min with supervision       Start:  02/07/25    Expected End:  02/21/25       INTERVENTIONS:  1. Practice sitting on the edge of a bed/mat with minimal support.  2. Educate patient about maintining total hip precautions while maintaining balance.  3. Educate patient about pressure relief.  4. Educate patient about use of assistive device.            Mobility       STG - Patient will ambulate 5-10 feet with approp a device with mod a 2       Start:  02/07/25    Expected End:  02/21/25               PT Transfers       STG - Transfer from bed to chair with mod a 1-2       Start:  02/07/25    Expected End:  02/21/25            STG - Patient to transfer to and from sit to supine min 1       Start:  02/07/25    Expected End:  02/21/25            STG - Patient will transfer sit to and from stand with mod 1-2       Start:  02/07/25    Expected End:  02/21/25               Pain - Adult

## 2025-02-07 NOTE — CONSULTS
Nutrition Initial Assessment:   Nutrition Assessment    Reason for Assessment: Admission nursing screening (MST=2)    Patient is a 89 y.o. male presenting with Pneumonia, unspecified organism, from skilled nursing facility (SNF).      PMH: Acute hypoxic respiratory failure, acute kidney injury (BARBARA), dementia, arteriosclerotic heart disease (ASHD), diabetes mellitus (DM) without complication, other schizophrenia, benign hypertension (HTN), hypothyroidism, benign prostatic hyperplasia without urinary obstruction, acute cystitis without hematuria, constipation, nausea and vomiting, fever, vitamin D deficiency, and anxiety.    Nutrition History:  Energy Intake: Poor < 50 %  Food and Nutrient History: Attempted to visit the patient today, but he was sleeping after multiple therapy sessions. Assessment is based on chart information, IDT rounds, and communication with nursing staff.  The patient is non-verbal and unable to follow commands but does respond to voice. Rena RN, reported that he tends to take his medications and food better when a spoon is placed in his hand, allowing him to feed himself. He drinks liquids well and consumed most of the oral nutrition supplement (ONS) provided to him this morning. Nursing also reports a loss of appetite, with less than 50% of meals being consumed.  Per the nursing flowsheet, the patient ate 25% of his breakfast. Speech therapy noted that he refused most of his pureed meal but did accept 90% of lemonade, 75% of the nutrition drink, and four bites of sherbet. He has an inconsistent cough, and there is concern for possible pneumonia-related dysphagia. The speech therapy plan includes continuing the pureed diet with thin liquids and providing maximal setup and assistance. Further assessment and monitoring for aspiration risk are planned.  Physical and occupational therapy documented that the patient does not follow commands, and has impaired mobility. He requires maximum  "assistance for activities of daily living (ADLs) and transfers. He is currently bedbound and refused standing or gait training. The therapy plan includes moderate-intensity therapy three times per week.  The provider note indicates constipation, with imaging showing a moderate to large stool burden. Medications for management include Senna, bisacodyl, docusate, and polyethylene glycol. The last recorded bowel movement was on 2/5/25 and was noted to be soft and brown.       Anthropometrics:  Height: 172.7 cm (5' 8\")   Weight: 83.9 kg (185 lb)   BMI (Calculated): 28.14  IBW/kg (Dietitian Calculated): 70 kg  Percent of IBW: 120 %       Weight History:   Wt Readings from Last 10 Encounters:   02/04/25 83.9 kg (185 lb)   10/03/2024 83.6 kg (184 lb) - wt from 10/10 nursing home visit encounter.     Weight Change %:  Weight History / % Weight Change: Stable weight from Oct 2024 to Feb 2025  Significant Weight Loss: No    Nutrition Focused Physical Exam Findings:    Subcutaneous Fat Loss:   Defer Subcutaneous Fat Loss Assessment: Defer all  Defer All Reason: pt sleeping  Muscle Wasting:  Defer Muscle Wasting Assessment: Defer all  Defer All Reason: pt sleeping  Edema:  Edema: +1 trace  Edema Location: BLE per nursing flowsheet  Physical Findings:  Teeth Findings: Edentulous    Nutrition Significant Labs:  CBC Trend:   Results from last 7 days   Lab Units 02/07/25  0602 02/06/25  0708 02/04/25 2139   WBC AUTO x10*3/uL 12.3* 15.4* 17.2*   RBC AUTO x10*6/uL 4.01* 3.91* 4.23*   HEMOGLOBIN g/dL 11.1* 10.9* 11.7*   HEMATOCRIT % 35.7* 34.5* 38.6*   MCV fL 89 88 91   PLATELETS AUTO x10*3/uL 223 235 243    , BMP Trend:   Results from last 7 days   Lab Units 02/07/25  0602 02/06/25  0708 02/04/25  2139   GLUCOSE mg/dL 75 113* 188*   CALCIUM mg/dL 8.1* 7.8* 9.2   SODIUM mmol/L 141 140 138   POTASSIUM mmol/L 4.1 3.9 4.2   CO2 mmol/L 24 26 22   CHLORIDE mmol/L 110* 107 103   BUN mg/dL 31* 44* 31*   CREATININE mg/dL 1.16 1.63* 1.20    , " A1C:  Lab Results   Component Value Date    HGBA1C 8.3 11/20/2020   , BG POCT trend:   Results from last 7 days   Lab Units 02/07/25  1137 02/07/25  0655 02/06/25 2026 02/06/25  1658 02/06/25  1249   POCT GLUCOSE mg/dL 152* 76 97 95 91      Nutrition Specific Medications:  medications  atorvastatin, 5 mg, oral, Daily  azithromycin, 500 mg, intravenous, q24h  bisacodyl, 10 mg, rectal, Daily  busPIRone, 5 mg, oral, BID  cefTRIAXone, 1 g, intravenous, q24h  cholecalciferol, 5,000 Units, oral, Daily  docusate sodium, 100 mg, oral, BID  empagliflozin, 10 mg, oral, Daily  enoxaparin, 40 mg, subcutaneous, q24h  finasteride, 5 mg, oral, Daily  glipiZIDE, 5 mg, oral, Daily  insulin glargine, 10 Units, subcutaneous, Nightly  insulin lispro, 0-10 Units, subcutaneous, TID AC  levothyroxine, 88 mcg, oral, Daily  LORazepam, 0.5 mg, oral, Daily before lunch  OXcarbazepine, 300 mg, oral, BID  pantoprazole  polyethylene glycol, 17 g, oral, Daily  sennosides, 1 tablet, oral, Daily  tamsulosin, 0.4 mg, oral, Daily    Continuous medications  sodium chloride 0.9%, 75 mL/hr, Last Rate: 75 mL/hr (02/07/25 0840)      I/O:   Last BM Date: 02/07/25 (smear); Stool Appearance: Loose, Soft (02/07/25 0900)    Dietary Orders (From admission, onward)       Start     Ordered    02/07/25 1025  Adult diet Regular; Pureed 4  Diet effective now        Question Answer Comment   Diet type Regular    Texture Pureed 4        02/07/25 1025    02/07/25 0505  Oral nutritional supplements  Until discontinued        Question Answer Comment   Deliver with Breakfast    Deliver with Dinner    Deliver with Lunch    Select supplement: Glucerna Shake        02/07/25 0504    02/05/25 1326  May Participate in Room Service With Assistance  ( ROOM SERVICE MAY PARTICIPATE WITH ASSISTANCE)  Once        Question:  .  Answer:  Yes    02/05/25 1325                Estimated Needs:   Total Energy Estimated Needs in 24 hours (kCal):  (5702-3786kcal/day)  Method for Estimating  Needs: 25-30 kcal/kg of actual BW  Total Protein Estimated Needs in 24 Hours (g):  (84-101g)  Method for Estimating 24 Hour Protein Needs: 1.0-1.2 g/kg of actual BW  Total Fluid Estimated Needs in 24 Hours (mL):  (2098-2517mL/day)  Method for Estimating 24 Hour Fluid Needs: 1 mL/kcal or per medical team.        Nutrition Diagnosis         Nutrition Diagnosis  Patient has Nutrition Diagnosis: Yes  Nutrition Diagnosis 1: Inadequate protein energy intake  Related to (1): loss of Appetite, and increased confussion  As Evidenced by (1): <50% of meal intake over hospital stay       Nutrition Interventions/Recommendations   Nutrition prescription for oral nutrition    Nutrition Recommendations:  Individualized Nutrition Prescription Provided for : Suggest liberalizing diet to regular diet, add Glucerna TID    Nutrition Interventions/Goals:   Interventions: Medical food supplement, Meals and snacks  Goal: Suggest liberalizing diet  Medical Food Supplement: Commercial beverage medical food supplement therapy  Goal: Glucerna TID (220kcal and 10g protein per 8 fluid oz)      Education Documentation  Not appropriate at this time.        Nutrition Monitoring and Evaluation   Food/Nutrient Related History Monitoring  Monitoring and Evaluation Plan: Estimated Energy Intake  Estimated Energy Intake: Energy intake 50 -75% of estimated energy needs    Anthropometric Measurements  Monitoring and Evaluation Plan: Body weight  Body Weight: Body weight - Maintain stable weight         Physical Exam Findings  Other: Evaluate nutrition intervention as compared to nutrition goal(s) and estimated nutrient need criteria.    Goal Status: New goal(s) identified    Time Spent (min): 60 minutes

## 2025-02-07 NOTE — CARE PLAN
The patient's goals for the shift include      The clinical goals for the shift include pt will be free of falls and injuries tonight    No falls or injuries. Pt refuses to take meds. Slept well and had an otherwise uneventful night

## 2025-02-07 NOTE — CARE PLAN
Problem: Fall/Injury  Goal: Not fall by end of shift  Outcome: Progressing  Goal: Be free from injury by end of the shift  Outcome: Progressing  Goal: Verbalize understanding of personal risk factors for fall in the hospital  Outcome: Progressing  Goal: Verbalize understanding of risk factor reduction measures to prevent injury from fall in the home  Outcome: Progressing  Goal: Use assistive devices by end of the shift  Outcome: Progressing  Goal: Pace activities to prevent fatigue by end of the shift  Outcome: Progressing     Problem: Respiratory  Goal: Clear secretions with interventions this shift  Outcome: Progressing  Goal: Minimize anxiety/maximize coping throughout shift  Outcome: Progressing  Goal: Minimal/no exertional discomfort or dyspnea this shift  Outcome: Progressing  Goal: No signs of respiratory distress (eg. Use of accessory muscles. Peds grunting)  Outcome: Progressing  Goal: Patent airway maintained this shift  Outcome: Progressing  Goal: Verbalize decreased shortness of breath this shift  Outcome: Progressing  Goal: Wean oxygen to maintain O2 saturation per order/standard this shift  Outcome: Progressing  Goal: Increase self care and/or family involvement in next 24 hours  Outcome: Progressing     Problem: Pain - Adult  Goal: Verbalizes/displays adequate comfort level or baseline comfort level  Outcome: Progressing     Problem: Safety - Adult  Goal: Free from fall injury  Outcome: Progressing     Problem: Discharge Planning  Goal: Discharge to home or other facility with appropriate resources  Outcome: Progressing     Problem: Chronic Conditions and Co-morbidities  Goal: Patient's chronic conditions and co-morbidity symptoms are monitored and maintained or improved  Outcome: Progressing     Problem: Nutrition  Goal: Nutrient intake appropriate for maintaining nutritional needs  Outcome: Progressing     Problem: Diabetes  Goal: Achieve decreasing blood glucose levels by end of shift  Outcome:  Progressing  Goal: Increase stability of blood glucose readings by end of shift  Outcome: Progressing  Goal: Decrease in ketones present in urine by end of shift  Outcome: Progressing  Goal: Maintain electrolyte levels within acceptable range throughout shift  Outcome: Progressing  Goal: Maintain glucose levels >70mg/dl to <250mg/dl throughout shift  Outcome: Progressing  Goal: No changes in neurological exam by end of shift  Outcome: Progressing  Goal: Learn about and adhere to nutrition recommendations by end of shift  Outcome: Progressing  Goal: Vital signs within normal range for age by end of shift  Outcome: Progressing  Goal: Increase self care and/or family involovement by end of shift  Outcome: Progressing  Goal: Receive DSME education by end of shift  Outcome: Progressing       The clinical goals for the shift include Have a bowel movement

## 2025-02-07 NOTE — PROGRESS NOTES
02/07/25 1451   Discharge Planning   Living Arrangements   ([facility])   Support Systems Organized support group (Comment)  (Supportive staff)   Assistance Needed Per [facility], patient ambulation is independent; assistance with care and doesn't use oxygen. Patient is nonverbal. Patient is long term as facility and can return without barriers.   Type of Residence Nursing home/residential care   Home or Post Acute Services Post acute facilities (Rehab/SNF/etc)   Type of Post Acute Facility Services Long term care   Expected Discharge Disposition Inter  ([facility])   Does the patient need discharge transport arranged? Yes   RoundTrip coordination needed? Yes   Has discharge transport been arranged? No   Patient Choice   Patient / Family choosing to utilize agency / facility established prior to hospitalization Yes   Intensity of Service   Intensity of Service 0-30 min     2:55 pm   Per discharge meeting, NP unsure if patient will be discharging this weekend.  Updates sent to [facility].  There are no barriers to return.  If discharged, bedside nurse should call his niece/POA Avani [phone] to inform of discharge and call nurse report to [phone].    Weekend DSC will send final orders and set up transportation.      DC PLAN:  [facility]

## 2025-02-07 NOTE — PROGRESS NOTES
Clive Woody is a 89 y.o. male on day 2 of admission presenting with Pneumonia, unspecified organism.      Subjective   Pt is non verbal and does not maintain eye contact or nod to questions - alert and no indications of pain upon exam        Objective     Last Recorded Vitals  BP (!) 168/91 (BP Location: Right arm, Patient Position: Lying) Comment: rn notified  Pulse 85   Temp 37 °C (98.6 °F) (Temporal)   Resp 18   Wt 83.9 kg (185 lb)   SpO2 95%   Intake/Output last 3 Shifts:    Intake/Output Summary (Last 24 hours) at 2/7/2025 1541  Last data filed at 2/7/2025 1535  Gross per 24 hour   Intake 3857.06 ml   Output 1250 ml   Net 2607.06 ml       Admission Weight  Weight: 83.9 kg (185 lb) (02/04/25 2102)    Daily Weight  02/04/25 : 83.9 kg (185 lb)    Image Results  CT head wo IV contrast  Narrative: Interpreted By:  Osman Maria,   STUDY:  CT HEAD WO IV CONTRAST;  2/5/2025 12:40 pm      INDICATION:  Signs/Symptoms:Altered mental status also septic..          COMPARISON:  11/19/2020      ACCESSION NUMBER(S):  [id number]      ORDERING CLINICIAN:  CANDICE LOERA      TECHNIQUE:  Noncontrast axial CT scan of head was performed. Angled reformats in  brain and bone windows were generated. The images were reviewed in  bone, brain, blood and soft tissue windows.      FINDINGS:  The ventricles, cisterns and sulci are prominent, consistent with  mild diffuse volume loss. There are areas of nonspecific white matter  hypodensity, which are probably age-related or microvascular in  nature.      Gray-white differentiation is intact and there is no evidence of  acute cortical infarct. No mass, mass effect or midline shift is  seen. There is no evidence of hemorrhage.      The visualized paranasal sinuses are remarkable for likely minimal  mucosal thickening or fluid in the left maxillary sinus.          Impression: No evidence of acute cortical infarct or intracranial hemorrhage.      Minimal mucosal thickening in the  left maxillary sinus.      MACRO:  None      Signed by: Osman Maria 2/5/2025 1:07 PM  Dictation workstation:   [id number]  ECG 12 lead  Sinus rhythm with 1st degree AV block  Nonspecific T wave abnormality  Abnormal ECG  When compared with ECG of 29-MAR-2022 21:20,  MO interval has increased  T wave inversion now evident in Inferior leads  CT chest abdomen pelvis w IV contrast  Narrative: Interpreted By:  Rell Alfonso,   STUDY:  CT CHEST ABDOMEN PELVIS W IV CONTRAST;  2/4/2025 11:00 pm      INDICATION:  Signs/Symptoms:vomiting      COMPARISON:  CT abdomen pelvis 03/29/2022.      ACCESSION NUMBER(S):  [id number]      ORDERING CLINICIAN:  DEION RODRIGUES      TECHNIQUE:  CT of the chest, abdomen, and pelvis was performed.  Contiguous axial images were obtained through the chest, abdomen and  pelvis. Coronal and sagittal reconstructions were performed.      The images were obtained in the portal venous phase.      75 ml of contrast material Omnipaque 350 were administered  intravenously without immediate complication.      FINDINGS:  CHEST:      LUNG/PLEURA/LARGE AIRWAYS:  There are no pleural effusions. There is no evidence of pneumothorax.  There are no consolidations. The tracheobronchial tree is patent.      There are bibasilar clustered ground-glass opacities suggestive of  infectious or inflammatory bronchiolitis.      VESSELS:  No traumatic aortic injury is appreciated within the limitations of  this non-EKG gated study.  The thoracic aorta is of normal course and  caliber.      Main pulmonary artery and its branches are normal in caliber.      HEART:  The heart is normal in size.   There is no pericardial effusion.      MEDIASTINUM AND BRODERICK:  No pneumomediastinum, abnormal mediastinal fluid collection or  mediastinal hematoma is appreciated. No mediastinal, hilar or  axillary adenopathy is present.      Esophagus is mildly distended with debris.      CHEST WALL AND LOWER NECK:  No acute fracture or  dislocation of the included osseous structures  are appreciated.  No suspicious osseous lesions are identified.  The  thoracic wall soft tissues are within normal limits.      ABDOMEN:      LIVER:  The liver is normal in size and contour. There is no subcapsular  hematoma, no perihepatic fluid collection.  There are no suspicious  hepatic lesions.      GALLBLADDER:  The gallbladder is nondistended, without evidence of radiopaque stone.      BILE DUCTS:  The intahepatic and extrahepatic bile ducts are not dilated.      PANCREAS:  Mild dilatation of the pancreatic duct measuring up to 6 mm in  caliber new from prior, however no evidence of peripancreatic edema  or mass.      SPLEEN:  No parenchymal perfusion deficit of the spleen is appreciated to  suggest contusion or laceration. There is no subcapsular hematoma, no  perisplenic fluid collection.      ADRENAL GLANDS:  The bilateral adrenal glands are unremarkable in appearance.      KIDNEYS AND URETERS:  No parenchymal perfusion deficit is appreciated in bilateral kidneys  to suggest contusion or laceration. There is no subcapsular hematoma,  no perinephric fluid collection. The kidneys are normal in size.  There is no hydronephrosis. There is no nephrolithiasis. 1.5 cm right  upper pole renal cyst similar to prior.      PELVIS:      BLADDER:  The urinary bladder is grossly normal.      REPRODUCTIVE ORGANS:  Enlarged prostate measuring 5.2 x 6.5 cm axially protruding into the  bladder apex.      BOWEL:  Small hiatal hernia. The stomach is unremarkable.  The small bowel is  normal in caliber without evidence of focal wall thickening or  obstruction.  There is no evidence of focal wall thickening or  dilatation of the large bowel.  Moderate to large amount of formed  stool throughout the colon and rectum without evidence of wall  thickening or inflammatory change. Appendix not visualized without  secondary signs of appendicitis.      VESSELS:  The aorta and IVC are  within normal limits.  The principal  vasculature of the abdomen and pelvis is patent.      PERITONEUM/RETROPERITONEUM/LYMPH NODES:  There is no evidence of intra- or retroperitoneal hematoma.  There is  no free or loculated fluid collection, no free intraperitoneal air.  No abdominopelvic lymphadenopathy is present.      BONES AND ABDOMINAL WALL:  No evidence of acute fracture or dislocation of the included osseous  structures.  No suspicious osseous lesions are identified.  There is  a right inguinal hernia containing small bowel new from prior,  however the bowel is collapsed without evidence of incarceration or  obstruction.      Impression: CHEST  1.  Clustered ground-glass opacities in the bilateral lower lobes  suspicious for acute infectious or inflammatory bronchiolitis.  Distended thoracic esophagus filled with debris.      ABDOMEN - PELVIS  1.  Evaluation of lower abdomen significantly limited by motion  artifact. There is moderate to large amount of formed stool  throughout the colon rectum without evidence of wall thickening or  inflammatory change. Correlation with constipation is recommended.  Right inguinal hernia containing loops of small bowel new from 2022,  however without bowel obstruction or incarceration.  2. Marked prostatomegaly.  3. Mild dilatation of the pancreatic duct without peripancreatic  edema is nonspecific and correlation with pancreatic enzymes is  recommended.          Signed by: Rell Alfonso 2/5/2025 12:26 AM  Dictation workstation:   [id number]      Physical Exam  Constitutional:       Appearance: Normal appearance.   HENT:      Head: Normocephalic.      Mouth/Throat:      Mouth: Mucous membranes are moist.   Eyes:      Extraocular Movements: Extraocular movements intact.   Cardiovascular:      Rate and Rhythm: Normal rate and regular rhythm.      Pulses: Normal pulses.      Heart sounds: Normal heart sounds.   Pulmonary:      Effort: Pulmonary effort is normal.      Breath  sounds: Rhonchi present.   Abdominal:      General: Bowel sounds are normal.      Palpations: Abdomen is soft.   Musculoskeletal:         General: Normal range of motion.      Cervical back: Normal range of motion.   Skin:     General: Skin is warm and dry.      Capillary Refill: Capillary refill takes less than 2 seconds.   Neurological:      General: No focal deficit present.      Mental Status: He is alert. Mental status is at baseline.   Psychiatric:      Comments: Non verbal and poor eye contact even with direct questions          Relevant Results           Scheduled medications  atorvastatin, 5 mg, oral, Daily  bisacodyl, 10 mg, rectal, Daily  busPIRone, 5 mg, oral, BID  cefTRIAXone, 1 g, intravenous, q24h  cholecalciferol, 5,000 Units, oral, Daily  docusate sodium, 100 mg, oral, BID  empagliflozin, 10 mg, oral, Daily  enoxaparin, 40 mg, subcutaneous, q24h  finasteride, 5 mg, oral, Daily  glipiZIDE, 5 mg, oral, Daily  insulin glargine, 10 Units, subcutaneous, Nightly  insulin lispro, 0-10 Units, subcutaneous, TID AC  ipratropium-albuteroL, 3 mL, nebulization, TID  levothyroxine, 88 mcg, oral, Daily  [Held by provider] lisinopril, 10 mg, oral, Daily  LORazepam, 0.5 mg, oral, Daily before lunch  [Held by provider] metFORMIN, 1,000 mg, oral, BID  OXcarbazepine, 300 mg, oral, BID  pantoprazole, 40 mg, oral, Daily before breakfast   Or  pantoprazole, 40 mg, intravenous, Daily before breakfast  polyethylene glycol, 17 g, oral, Daily  sennosides, 1 tablet, oral, Daily  tamsulosin, 0.4 mg, oral, Daily      Continuous medications  sodium chloride 0.9%, 75 mL/hr, Last Rate: 75 mL/hr (02/07/25 1535)      PRN medications  PRN medications: acetaminophen **OR** acetaminophen **OR** acetaminophen, acetaminophen **OR** acetaminophen **OR** acetaminophen, albuterol, alum-mag hydroxide-simeth, benzocaine-menthol, dextromethorphan-guaifenesin, dextrose, dextrose, glucagon, glucagon, guaiFENesin, melatonin, ondansetron ODT **OR**  ondansetron, oxygen  Results for orders placed or performed during the hospital encounter of 02/04/25 (from the past 24 hours)   POCT GLUCOSE   Result Value Ref Range    POCT Glucose 95 74 - 99 mg/dL   POCT GLUCOSE   Result Value Ref Range    POCT Glucose 97 74 - 99 mg/dL   CBC   Result Value Ref Range    WBC 12.3 (H) 4.4 - 11.3 x10*3/uL    nRBC 0.0 0.0 - 0.0 /100 WBCs    RBC 4.01 (L) 4.50 - 5.90 x10*6/uL    Hemoglobin 11.1 (L) 13.5 - 17.5 g/dL    Hematocrit 35.7 (L) 41.0 - 52.0 %    MCV 89 80 - 100 fL    MCH 27.7 26.0 - 34.0 pg    MCHC 31.1 (L) 32.0 - 36.0 g/dL    RDW 15.6 (H) 11.5 - 14.5 %    Platelets 223 150 - 450 x10*3/uL   Basic Metabolic Panel   Result Value Ref Range    Glucose 75 74 - 99 mg/dL    Sodium 141 136 - 145 mmol/L    Potassium 4.1 3.5 - 5.3 mmol/L    Chloride 110 (H) 98 - 107 mmol/L    Bicarbonate 24 21 - 32 mmol/L    Anion Gap 11 10 - 20 mmol/L    Urea Nitrogen 31 (H) 6 - 23 mg/dL    Creatinine 1.16 0.50 - 1.30 mg/dL    eGFR 59 (L) >60 mL/min/1.73m*2    Calcium 8.1 (L) 8.6 - 10.3 mg/dL   Magnesium   Result Value Ref Range    Magnesium 2.14 1.60 - 2.40 mg/dL   POCT GLUCOSE   Result Value Ref Range    POCT Glucose 76 74 - 99 mg/dL   POCT GLUCOSE   Result Value Ref Range    POCT Glucose 152 (H) 74 - 99 mg/dL     CT head wo IV contrast    Result Date: 2/5/2025  Interpreted By:  Osman Maria, STUDY: CT HEAD WO IV CONTRAST;  2/5/2025 12:40 pm   INDICATION: Signs/Symptoms:Altered mental status also septic..     COMPARISON: 11/19/2020   ACCESSION NUMBER(S): [id number]   ORDERING CLINICIAN: CANDICE LOERA   TECHNIQUE: Noncontrast axial CT scan of head was performed. Angled reformats in brain and bone windows were generated. The images were reviewed in bone, brain, blood and soft tissue windows.   FINDINGS: The ventricles, cisterns and sulci are prominent, consistent with mild diffuse volume loss. There are areas of nonspecific white matter hypodensity, which are probably age-related or microvascular in  nature.   Gray-white differentiation is intact and there is no evidence of acute cortical infarct. No mass, mass effect or midline shift is seen. There is no evidence of hemorrhage.   The visualized paranasal sinuses are remarkable for likely minimal mucosal thickening or fluid in the left maxillary sinus.         No evidence of acute cortical infarct or intracranial hemorrhage.   Minimal mucosal thickening in the left maxillary sinus.   MACRO: None   Signed by: Osman Maria 2/5/2025 1:07 PM Dictation workstation:   [id number]    ECG 12 lead    Result Date: 2/5/2025  Sinus rhythm with 1st degree AV block Nonspecific T wave abnormality Abnormal ECG When compared with ECG of 29-MAR-2022 21:20, HI interval has increased T wave inversion now evident in Inferior leads    CT chest abdomen pelvis w IV contrast    Result Date: 2/5/2025  Interpreted By:  Rell Alfonso, STUDY: CT CHEST ABDOMEN PELVIS W IV CONTRAST;  2/4/2025 11:00 pm   INDICATION: Signs/Symptoms:vomiting   COMPARISON: CT abdomen pelvis 03/29/2022.   ACCESSION NUMBER(S): [id number]   ORDERING CLINICIAN: DEION RODRIGUES   TECHNIQUE: CT of the chest, abdomen, and pelvis was performed. Contiguous axial images were obtained through the chest, abdomen and pelvis. Coronal and sagittal reconstructions were performed.   The images were obtained in the portal venous phase.   75 ml of contrast material Omnipaque 350 were administered intravenously without immediate complication.   FINDINGS: CHEST:   LUNG/PLEURA/LARGE AIRWAYS: There are no pleural effusions. There is no evidence of pneumothorax. There are no consolidations. The tracheobronchial tree is patent.   There are bibasilar clustered ground-glass opacities suggestive of infectious or inflammatory bronchiolitis.   VESSELS: No traumatic aortic injury is appreciated within the limitations of this non-EKG gated study.  The thoracic aorta is of normal course and caliber.   Main pulmonary artery and its branches are  normal in caliber.   HEART: The heart is normal in size.   There is no pericardial effusion.   MEDIASTINUM AND BRODERICK: No pneumomediastinum, abnormal mediastinal fluid collection or mediastinal hematoma is appreciated. No mediastinal, hilar or axillary adenopathy is present.   Esophagus is mildly distended with debris.   CHEST WALL AND LOWER NECK: No acute fracture or dislocation of the included osseous structures are appreciated.  No suspicious osseous lesions are identified.  The thoracic wall soft tissues are within normal limits.   ABDOMEN:   LIVER: The liver is normal in size and contour. There is no subcapsular hematoma, no perihepatic fluid collection.  There are no suspicious hepatic lesions.   GALLBLADDER: The gallbladder is nondistended, without evidence of radiopaque stone.   BILE DUCTS: The intahepatic and extrahepatic bile ducts are not dilated.   PANCREAS: Mild dilatation of the pancreatic duct measuring up to 6 mm in caliber new from prior, however no evidence of peripancreatic edema or mass.   SPLEEN: No parenchymal perfusion deficit of the spleen is appreciated to suggest contusion or laceration. There is no subcapsular hematoma, no perisplenic fluid collection.   ADRENAL GLANDS: The bilateral adrenal glands are unremarkable in appearance.   KIDNEYS AND URETERS: No parenchymal perfusion deficit is appreciated in bilateral kidneys to suggest contusion or laceration. There is no subcapsular hematoma, no perinephric fluid collection. The kidneys are normal in size. There is no hydronephrosis. There is no nephrolithiasis. 1.5 cm right upper pole renal cyst similar to prior.   PELVIS:   BLADDER: The urinary bladder is grossly normal.   REPRODUCTIVE ORGANS: Enlarged prostate measuring 5.2 x 6.5 cm axially protruding into the bladder apex.   BOWEL: Small hiatal hernia. The stomach is unremarkable.  The small bowel is normal in caliber without evidence of focal wall thickening or obstruction.  There is no  evidence of focal wall thickening or dilatation of the large bowel.  Moderate to large amount of formed stool throughout the colon and rectum without evidence of wall thickening or inflammatory change. Appendix not visualized without secondary signs of appendicitis.   VESSELS: The aorta and IVC are within normal limits.  The principal vasculature of the abdomen and pelvis is patent.   PERITONEUM/RETROPERITONEUM/LYMPH NODES: There is no evidence of intra- or retroperitoneal hematoma.  There is no free or loculated fluid collection, no free intraperitoneal air. No abdominopelvic lymphadenopathy is present.   BONES AND ABDOMINAL WALL: No evidence of acute fracture or dislocation of the included osseous structures.  No suspicious osseous lesions are identified.  There is a right inguinal hernia containing small bowel new from prior, however the bowel is collapsed without evidence of incarceration or obstruction.       CHEST 1.  Clustered ground-glass opacities in the bilateral lower lobes suspicious for acute infectious or inflammatory bronchiolitis. Distended thoracic esophagus filled with debris.   ABDOMEN - PELVIS 1.  Evaluation of lower abdomen significantly limited by motion artifact. There is moderate to large amount of formed stool throughout the colon rectum without evidence of wall thickening or inflammatory change. Correlation with constipation is recommended. Right inguinal hernia containing loops of small bowel new from 2022, however without bowel obstruction or incarceration. 2. Marked prostatomegaly. 3. Mild dilatation of the pancreatic duct without peripancreatic edema is nonspecific and correlation with pancreatic enzymes is recommended.     Signed by: Rell Alfonso 2/5/2025 12:26 AM Dictation workstation:   [id number]       Assessment/Plan      Assessment & Plan  Pneumonia, unspecified organism    Dementia    Other schizophrenia    Benign hypertension    Diabetes mellitus without complication  (Multi)    ASHD (arteriosclerotic heart disease)    Benign prostatic hyperplasia without urinary obstruction    Acute hypoxic respiratory failure (Multi)    Fever    Nausea & vomiting    Constipation    Acute cystitis without hematuria    Hypothyroidism    Vitamin D deficiency    Anxiety    BARBARA (acute kidney injury) (CMS-[facility])    #Acute hypoxic respiratory failure, resolved   #Pneumonia, community acquired  #Fever, resolved   - Brought to ED after episode of emesis at [facility]  - TMax 39.1C in ED, Tylenol suppository given with improvement  - Required 3L O2 in the ED, not on home O2  - CT chest: Clustered ground-glass opacities in the bilateral lower lobes   suspicious for acute infectious or inflammatory bronchiolitis. Distended thoracic esophagus filled with debris.   - WBC 17.2 > 15.4   - COVID, flu negative   - MRSA nares negative   - Procal 12.89 , BCx pending  - Sputum culture if able   - Continue azithromycin and ceftriaxone (day 2)- no recent hospital admissions   - DuoNebs TID  - Tylenol PRN for fever   - RT eval and treat protocol  - Bronchial hygiene  - Monitor SpO2 continuously   - Baseline O2 None   - Wean O2 as tolerated; patient currently on RA  - SLP consulted, recommending pureed diet/regular thin liquids      #Nausea and vomiting, resolved  #Constipation  - CT A/P:  Evaluation of lower abdomen significantly limited by motion   artifact. There is moderate to large amount of formed stool   throughout the colon rectum without evidence of wall thickening or   inflammatory change. Correlation with constipation is recommended.   Right inguinal hernia containing loops of small bowel new from 2022,   however without bowel obstruction or incarceration.   - No emesis since admission  - Continue Colace 100 mg BID, Miralax 17 g every day  - Continue Senokot 8.6 mg at bedtime   - Bisacodyl 10 mg suppository every day   - Diet as tolerated  - Zofran PRN for nausea/vomiting   - Monitor for BM     #BARBARA  - Cr 1.20 >  1.63> 1.16  today  - Patient did receive IV contrast and Toradol in the ED on 2/5   - Lisinopril held today  - Start NS @ 75 ml/hr - discontinued 2/7/25   - Encourage PO intake as tolerated   - Renally dose meds  - Daily BMP     #UTI  - UA: 250 leuk esterase, 21-50 WBCs, 6-10 RBCs, budding yeast  - Ucx 20,000 - 80,000 CFU/mL Candida glabrata, BCx pending  - Received Zosyn in the ED  - Continue ceftriaxone (day 3)  - Adjust ATBs as needed pending final culture results     #HLD  #HTN  - Trop 8  - Continue atorvastatin 5 mg every day  - Lisinopril held 2/2 BARBARA; BP well controlled today 138/90  - Telemetry monitoring  - Monitor BP     #Hypothyroidism   - Continue levothyroxine 88 mcg every day     #T2DM  - Continue empagliflozin 10 mg every day, glipizide 5 mg every day, Lantus 10 units at bedtime  - Metformin held; patient received IV contrast on 2/5   - SSI three times a day before meals   - Hypoglycemia protocol  - Accuchecks ac/hs/prn  - Diabetic diet      #BPH  - Continue tamsulosin 0.4 mg every day, finasteride 5 mg every day  - Bladder scan PRN     #Schizophrenia  #Anxiety  #Dementia    - Continue buspirone 5 mg BID, lorazepam 0.5 mg every day, oxcarbazepine 300 mg BID   - Delirium precautions: Frequent reorientation, day/night normalization, shades open for sunlight, provide glasses/hearing aids as needed  - From [facility]; likely return on discharge   - PT/OT evals      #Vitamin D deficiency   - Continue cholecalciferol 5000 units every day     DVT PPx: Lovenox   GI PPx: Protonix   Diet: Consistent carb     Code Status: Full      Disposition: Patient requires inpatient management at this time.               Elaine Pringle, APRN-CNP

## 2025-02-07 NOTE — PROGRESS NOTES
Occupational Therapy    Evaluation    Patient Name: Clive Woody  MRN: [id number]  Department: [facility]  Room: [address] [place] [address]  Today's Date: 2/7/2025  Time Calculation  Start Time: 0859  Stop Time: 0916  Time Calculation (min): 17 min    Assessment  IP OT Assessment  OT Assessment: Pt is a 89 yo M referred to occupational therapy for impaired self-care and functional mobility 2/2 hospitalization for pneumonia. Pt from long-term care facility, requires assist for ADLs and completes functional mobility ind w/ FWW per staff. Pt nonverbal w/ no responses during evaluation or indication he understands what is being asked of him. Pt demonstrates impaired self-care, functional mobility/transfers, and endurance this date. Pt required max A to roll and max A x2 for sup to sit. Pt able to complete sit to sup w/ CGA. Pt would benefit from continued OT services at the MOD intensity level to increase functional independence and help w/ return to PLOF.  Prognosis: Good  Barriers to Discharge Home: No anticipated barriers  Evaluation/Treatment Tolerance: Patient limited by fatigue  Medical Staff Made Aware: Yes  End of Session Communication: Bedside nurse  End of Session Patient Position: Alarm on, Bed, 2 rail up  Plan:  Treatment Interventions: ADL retraining, Functional transfer training, UE strengthening/ROM, Endurance training, Equipment evaluation/education, Fine motor coordination activities, Compensatory technique education, Patient/family training  OT Frequency: 3 times per week  OT Discharge Recommendations: Moderate intensity level of continued care  OT Recommended Transfer Status: Maximum assist, Assist of 2  OT - OK to Discharge: Yes (Based on completed evaluation and care plan recommendations, no barriers to discharge to next site of care)    Subjective   Current Problem:  1. Pneumonia, unspecified organism        2. Nausea and vomiting, unspecified vomiting type        3. Pneumonia of both lower lobes due to infectious  organism        4. Hypoxia          General:  General  Reason for Referral: Pt is a 89 yo M referred to occupational therapy for impaired self-care and functional mobility 2/2 hospitalization for pneumonia.  Referred By: Zainab Khoury PA-C  Past Medical History Relevant to Rehab: Dementia, hypertension, acute hypoxic respiratory failure, hypothyroidism, anxiety, asthnia, BARBARA,  Family/Caregiver Present: No  Prior to Session Communication: Bedside nurse  Patient Position Received: Bed, 2 rail up, Alarm on (Nursing students in room w/ pt)  Preferred Learning Style: verbal  General Comment: Pt agreeable to therapy evaluation. Pt nonverbal, cleared by RN prior to session.  Precautions:  Medical Precautions: Fall precautions  Precautions Comment: nonverbal, purewick, tele, masimo     Date/Time Vitals Session Patient Position Pulse Resp SpO2 BP MAP (mmHg)    02/07/25 1000 During PT  Lying  90  --  95 %  --  --           Pain:  Pain Assessment  Pain Assessment: Blanca-Baker FACES  Blanca-Baker FACES Pain Rating: No hurt    Objective   Cognition:  Overall Cognitive Status: Unable to assess  Arousal/Alertness: Delayed responses to stimuli  Orientation Level: Unable to assess (Pt nonverbal)    Home Living:  Type of Home: Long Term Care facility  Lives With: Alone  Home Adaptive Equipment: Walker rolling or standard  Home Layout: One level  Home Access: Level entry   Prior Function:  Level of Morgan: Independent with homemaking with ambulation, Needs assistance with ADLs  Receives Help From: Personal care attendant (Staff at [facility])  ADL Assistance: Needs assistance (Staff assists w/ ADLs as needed)  Homemaking Assistance: Needs assistance (Staff at [facility] complete all IADL tasks)  Ambulatory Assistance: Independent (Per staff, ind w/ walker for all mobility)    ADL:  Eating Assistance: Maximal  Grooming Assistance: Maximal  Bathing Assistance: Maximal  UE Dressing Assistance: Moderate  LE  Dressing Assistance: Maximal  Toileting Assistance with Device: Total  Toileting Deficit: Urinary Catheter, Perineal hygiene  Functional Assistance: Maximal  ADL Comments: Pt required max x1 to roll in bed for perineal hygiene. Pt completed supine to sit transfer w/ max A x2 and refused further mobility. Other ADLs anticipated.  Activity Tolerance:  Endurance: Tolerates less than 10 min exercise, no significant change in vital signs  Bed Mobility/Transfers:   Bed Mobility  Bed Mobility: Yes  Bed Mobility 1  Bed Mobility 1: Rolling right, Rolling left  Level of Assistance 1: Maximum assistance  Bed Mobility Comments 1: Rolling to allow for perineal hygiene  Bed Mobility 2  Bed Mobility  2: Supine to sitting  Level of Assistance 2: Maximum assistance, +2  Bed Mobility 3  Bed Mobility 3: Sitting to supine  Level of Assistance 3: Contact guard  Bed Mobility Comments 3: Pt able to bring BLE into bed, min A to adjust shoulders into midline position    Transfers  Transfer: No (Pt refused STS transfer this date. Pt nonverbal, got back into bed when asked if he wanted to stand w/ walker)    Sitting Balance:  Static Sitting Balance  Static Sitting-Balance Support: Feet supported  Static Sitting-Level of Assistance: Contact guard  Dynamic Sitting Balance  Dynamic Sitting-Balance Support: Feet supported  Dynamic Sitting-Level of Assistance: Contact guard    Sensation:  Light Touch: No apparent deficits  Strength:  Strength Comments: not formally tested, at least 3+/5 during functional activities  Coordination:  Movements are Fluid and Coordinated: No   Hand Function:  Hand Function  Gross Grasp: Functional  Coordination: Functional  Extremities:  RUE: Within Functional Limits (PROM, AROM not tested)  LUE: Within Functional Limits (PROM, AROM not tested)    Outcome Measures: [facility] Daily Activity  Putting on and taking off regular lower body clothing: A lot  Bathing (including washing, rinsing, drying): A lot  Putting on and  taking off regular upper body clothing: A lot  Toileting, which includes using toilet, bedpan or urinal: Total  Taking care of personal grooming such as brushing teeth: A lot  Eating Meals: A lot  Daily Activity - Total Score: 11    Education Documentation  Body Mechanics, taught by Olamide Doe OT at 2/7/2025 11:26 AM.  Learner: Patient  Readiness: Acceptance  Method: Explanation  Response: Needs Reinforcement  Comment: Pt education on POC, DC recs, safety and body mechanics when completing ADLs and transfers.    ADL Training, taught by Olamide Doe OT at 2/7/2025 11:26 AM.  Learner: Patient  Readiness: Acceptance  Method: Explanation  Response: Needs Reinforcement  Comment: Pt education on POC, DC recs, safety and body mechanics when completing ADLs and transfers.    Goals:   Encounter Problems       Encounter Problems (Active)       ADLs       Patient will perform UB bathing with minimal assist  level of assistance and PRN bathroom equipment.       Start:  02/07/25    Expected End:  02/21/25            Patient with complete upper body dressing with minimal assist  level of assistance donning and doffing all UE clothes with PRN adaptive equipment while supported sitting or edge of bed        Start:  02/07/25    Expected End:  02/21/25            Patient with complete lower body dressing with minimal assist  level of assistance donning and doffing all LE clothes  with PRN adaptive equipment while supported sitting and standing       Start:  02/07/25    Expected End:  02/21/25            Patient will complete daily grooming tasks with minimal assist  level of assistance and PRN adaptive equipment while supported sitting.       Start:  02/07/25    Expected End:  02/21/25            Patient will complete toileting including hygiene clothing management/hygiene with minimal assist  level of assistance and PRN bathroom equipment.       Start:  02/07/25    Expected End:  02/21/25               BALANCE       Pt  will maintain dynamic seated balance during ADL task with stand by assist level of assistance in order to demonstrate decreased risk of falling and improved postural control.       Start:  02/07/25    Expected End:  02/21/25               TRANSFERS       Patient will perform bed mobility minimal assist  level of assistance and bed rails in order to improve safety and independence with mobility       Start:  02/07/25    Expected End:  02/21/25            Patient will complete functional transfer to chair/toilet/BSC with least restrictive device with minimal assist  level of assistance.       Start:  02/07/25    Expected End:  02/21/25

## 2025-02-08 LAB
ANION GAP SERPL CALC-SCNC: 14 MMOL/L (ref 10–20)
BASOPHILS # BLD AUTO: 0.03 X10*3/UL (ref 0–0.1)
BASOPHILS NFR BLD AUTO: 0.4 %
BNP SERPL-MCNC: 180 PG/ML (ref 0–99)
BUN SERPL-MCNC: 20 MG/DL (ref 6–23)
CALCIUM SERPL-MCNC: 7.8 MG/DL (ref 8.6–10.3)
CHLORIDE SERPL-SCNC: 103 MMOL/L (ref 98–107)
CO2 SERPL-SCNC: 20 MMOL/L (ref 21–32)
CREAT SERPL-MCNC: 0.87 MG/DL (ref 0.5–1.3)
EGFRCR SERPLBLD CKD-EPI 2021: 81 ML/MIN/1.73M*2
EOSINOPHIL # BLD AUTO: 0.05 X10*3/UL (ref 0–0.4)
EOSINOPHIL NFR BLD AUTO: 0.6 %
ERYTHROCYTE [DISTWIDTH] IN BLOOD BY AUTOMATED COUNT: 15 % (ref 11.5–14.5)
GLUCOSE BLD MANUAL STRIP-MCNC: 110 MG/DL (ref 74–99)
GLUCOSE BLD MANUAL STRIP-MCNC: 120 MG/DL (ref 74–99)
GLUCOSE BLD MANUAL STRIP-MCNC: 98 MG/DL (ref 74–99)
GLUCOSE SERPL-MCNC: 112 MG/DL (ref 74–99)
HCT VFR BLD AUTO: 37.6 % (ref 41–52)
HGB BLD-MCNC: 12.1 G/DL (ref 13.5–17.5)
IMM GRANULOCYTES # BLD AUTO: 0.03 X10*3/UL (ref 0–0.5)
IMM GRANULOCYTES NFR BLD AUTO: 0.4 % (ref 0–0.9)
LYMPHOCYTES # BLD AUTO: 1.31 X10*3/UL (ref 0.8–3)
LYMPHOCYTES NFR BLD AUTO: 15.4 %
MCH RBC QN AUTO: 28.3 PG (ref 26–34)
MCHC RBC AUTO-ENTMCNC: 32.2 G/DL (ref 32–36)
MCV RBC AUTO: 88 FL (ref 80–100)
MONOCYTES # BLD AUTO: 0.37 X10*3/UL (ref 0.05–0.8)
MONOCYTES NFR BLD AUTO: 4.4 %
NEUTROPHILS # BLD AUTO: 6.71 X10*3/UL (ref 1.6–5.5)
NEUTROPHILS NFR BLD AUTO: 78.8 %
NRBC BLD-RTO: 0 /100 WBCS (ref 0–0)
PLATELET # BLD AUTO: 223 X10*3/UL (ref 150–450)
POTASSIUM SERPL-SCNC: 4.5 MMOL/L (ref 3.5–5.3)
RBC # BLD AUTO: 4.28 X10*6/UL (ref 4.5–5.9)
SODIUM SERPL-SCNC: 132 MMOL/L (ref 136–145)
WBC # BLD AUTO: 8.5 X10*3/UL (ref 4.4–11.3)

## 2025-02-08 PROCEDURE — 80048 BASIC METABOLIC PNL TOTAL CA: CPT | Mod: IPSPLIT | Performed by: NURSE PRACTITIONER

## 2025-02-08 PROCEDURE — 2500000004 HC RX 250 GENERAL PHARMACY W/ HCPCS (ALT 636 FOR OP/ED): Mod: IPSPLIT | Performed by: STUDENT IN AN ORGANIZED HEALTH CARE EDUCATION/TRAINING PROGRAM

## 2025-02-08 PROCEDURE — 2500000004 HC RX 250 GENERAL PHARMACY W/ HCPCS (ALT 636 FOR OP/ED): Mod: IPSPLIT | Performed by: NURSE PRACTITIONER

## 2025-02-08 PROCEDURE — 2500000001 HC RX 250 WO HCPCS SELF ADMINISTERED DRUGS (ALT 637 FOR MEDICARE OP): Mod: IPSPLIT | Performed by: NURSE PRACTITIONER

## 2025-02-08 PROCEDURE — 94760 N-INVAS EAR/PLS OXIMETRY 1: CPT | Mod: IPSPLIT

## 2025-02-08 PROCEDURE — 97110 THERAPEUTIC EXERCISES: CPT | Mod: GP,CQ,IPSPLIT

## 2025-02-08 PROCEDURE — 97530 THERAPEUTIC ACTIVITIES: CPT | Mod: GP,CQ,IPSPLIT

## 2025-02-08 PROCEDURE — 97535 SELF CARE MNGMENT TRAINING: CPT | Mod: GO,IPSPLIT

## 2025-02-08 PROCEDURE — 36415 COLL VENOUS BLD VENIPUNCTURE: CPT | Mod: IPSPLIT | Performed by: NURSE PRACTITIONER

## 2025-02-08 PROCEDURE — 2500000004 HC RX 250 GENERAL PHARMACY W/ HCPCS (ALT 636 FOR OP/ED): Mod: IPSPLIT

## 2025-02-08 PROCEDURE — 82947 ASSAY GLUCOSE BLOOD QUANT: CPT | Mod: IPSPLIT

## 2025-02-08 PROCEDURE — 2500000001 HC RX 250 WO HCPCS SELF ADMINISTERED DRUGS (ALT 637 FOR MEDICARE OP): Mod: IPSPLIT

## 2025-02-08 PROCEDURE — 2500000002 HC RX 250 W HCPCS SELF ADMINISTERED DRUGS (ALT 637 FOR MEDICARE OP, ALT 636 FOR OP/ED): Mod: IPSPLIT

## 2025-02-08 PROCEDURE — 1200000002 HC GENERAL ROOM WITH TELEMETRY DAILY: Mod: IPSPLIT

## 2025-02-08 PROCEDURE — 85025 COMPLETE CBC W/AUTO DIFF WBC: CPT | Mod: IPSPLIT | Performed by: NURSE PRACTITIONER

## 2025-02-08 PROCEDURE — 99232 SBSQ HOSP IP/OBS MODERATE 35: CPT | Performed by: NURSE PRACTITIONER

## 2025-02-08 PROCEDURE — 82565 ASSAY OF CREATININE: CPT | Mod: IPSPLIT | Performed by: NURSE PRACTITIONER

## 2025-02-08 PROCEDURE — 83880 ASSAY OF NATRIURETIC PEPTIDE: CPT | Mod: IPSPLIT | Performed by: NURSE PRACTITIONER

## 2025-02-08 RX ORDER — HYDRALAZINE HYDROCHLORIDE 20 MG/ML
10 INJECTION INTRAMUSCULAR; INTRAVENOUS EVERY 4 HOURS PRN
Status: DISCONTINUED | OUTPATIENT
Start: 2025-02-08 | End: 2025-02-09 | Stop reason: HOSPADM

## 2025-02-08 RX ADMIN — LEVOTHYROXINE SODIUM 88 MCG: 88 TABLET ORAL at 08:10

## 2025-02-08 RX ADMIN — DOCUSATE SODIUM 100 MG: 100 CAPSULE, LIQUID FILLED ORAL at 08:10

## 2025-02-08 RX ADMIN — ATORVASTATIN CALCIUM 5 MG: 10 TABLET, FILM COATED ORAL at 08:10

## 2025-02-08 RX ADMIN — CHOLECALCIFEROL TAB 125 MCG (5000 UNIT) 5000 UNITS: 125 TAB at 08:10

## 2025-02-08 RX ADMIN — POLYETHYLENE GLYCOL 3350 17 G: 17 POWDER, FOR SOLUTION ORAL at 08:12

## 2025-02-08 RX ADMIN — HYDRALAZINE HYDROCHLORIDE 10 MG: 20 INJECTION INTRAMUSCULAR; INTRAVENOUS at 01:12

## 2025-02-08 RX ADMIN — LISINOPRIL 10 MG: 10 TABLET ORAL at 08:10

## 2025-02-08 RX ADMIN — BUSPIRONE HYDROCHLORIDE 5 MG: 5 TABLET ORAL at 08:10

## 2025-02-08 RX ADMIN — OXCARBAZEPINE 300 MG: 150 TABLET, FILM COATED ORAL at 08:10

## 2025-02-08 RX ADMIN — SENNOSIDES 8.6 MG: 8.6 TABLET, FILM COATED ORAL at 08:10

## 2025-02-08 RX ADMIN — GLIPIZIDE 5 MG: 5 TABLET ORAL at 08:10

## 2025-02-08 RX ADMIN — EMPAGLIFLOZIN 10 MG: 10 TABLET, FILM COATED ORAL at 08:12

## 2025-02-08 RX ADMIN — TAMSULOSIN HYDROCHLORIDE 0.4 MG: 0.4 CAPSULE ORAL at 08:10

## 2025-02-08 RX ADMIN — ENOXAPARIN SODIUM 40 MG: 40 INJECTION SUBCUTANEOUS at 12:57

## 2025-02-08 RX ADMIN — FINASTERIDE 5 MG: 5 TABLET, FILM COATED ORAL at 08:10

## 2025-02-08 RX ADMIN — CEFTRIAXONE 1 G: 1 INJECTION, SOLUTION INTRAVENOUS at 08:10

## 2025-02-08 ASSESSMENT — COGNITIVE AND FUNCTIONAL STATUS - GENERAL
DRESSING REGULAR UPPER BODY CLOTHING: TOTAL
STANDING UP FROM CHAIR USING ARMS: A LOT
EATING MEALS: A LOT
HELP NEEDED FOR BATHING: TOTAL
DAILY ACTIVITIY SCORE: 7
HELP NEEDED FOR BATHING: TOTAL
DRESSING REGULAR UPPER BODY CLOTHING: TOTAL
DRESSING REGULAR UPPER BODY CLOTHING: A LOT
MOVING FROM LYING ON BACK TO SITTING ON SIDE OF FLAT BED WITH BEDRAILS: A LITTLE
MOVING FROM LYING ON BACK TO SITTING ON SIDE OF FLAT BED WITH BEDRAILS: A LITTLE
TOILETING: TOTAL
DAILY ACTIVITIY SCORE: 7
EATING MEALS: A LITTLE
MOVING TO AND FROM BED TO CHAIR: A LOT
WALKING IN HOSPITAL ROOM: A LOT
EATING MEALS: A LOT
PERSONAL GROOMING: A LOT
DAILY ACTIVITIY SCORE: 10
MOBILITY SCORE: 13
MOBILITY SCORE: 13
CLIMB 3 TO 5 STEPS WITH RAILING: TOTAL
PERSONAL GROOMING: TOTAL
PERSONAL GROOMING: TOTAL
WALKING IN HOSPITAL ROOM: A LITTLE
WALKING IN HOSPITAL ROOM: A LOT
TURNING FROM BACK TO SIDE WHILE IN FLAT BAD: A LITTLE
MOVING TO AND FROM BED TO CHAIR: A LOT
TOILETING: TOTAL
STANDING UP FROM CHAIR USING ARMS: A LOT
TURNING FROM BACK TO SIDE WHILE IN FLAT BAD: A LOT
DRESSING REGULAR LOWER BODY CLOTHING: TOTAL
STANDING UP FROM CHAIR USING ARMS: A LITTLE
MOVING FROM LYING ON BACK TO SITTING ON SIDE OF FLAT BED WITH BEDRAILS: A LOT
HELP NEEDED FOR BATHING: TOTAL
DRESSING REGULAR LOWER BODY CLOTHING: TOTAL
MOBILITY SCORE: 14
CLIMB 3 TO 5 STEPS WITH RAILING: TOTAL
MOVING TO AND FROM BED TO CHAIR: A LOT
CLIMB 3 TO 5 STEPS WITH RAILING: A LOT
DRESSING REGULAR LOWER BODY CLOTHING: TOTAL
TOILETING: TOTAL
TURNING FROM BACK TO SIDE WHILE IN FLAT BAD: A LITTLE

## 2025-02-08 ASSESSMENT — PAIN SCALES - PAIN ASSESSMENT IN ADVANCED DEMENTIA (PAINAD)
FACIALEXPRESSION: SMILING OR INEXPRESSIVE
BREATHING: NORMAL
TOTALSCORE: 0
BREATHING: NORMAL
TOTALSCORE: 1
CONSOLABILITY: NO NEED TO CONSOLE
BODYLANGUAGE: RELAXED
FACIALEXPRESSION: SMILING OR INEXPRESSIVE
BODYLANGUAGE: RELAXED
FACIALEXPRESSION: SAD, FRIGHTENED, FROWN
CONSOLABILITY: NO NEED TO CONSOLE

## 2025-02-08 ASSESSMENT — PAIN - FUNCTIONAL ASSESSMENT
PAIN_FUNCTIONAL_ASSESSMENT: UNABLE TO SELF-REPORT
PAIN_FUNCTIONAL_ASSESSMENT: UNABLE TO SELF-REPORT

## 2025-02-08 ASSESSMENT — ACTIVITIES OF DAILY LIVING (ADL): HOME_MANAGEMENT_TIME_ENTRY: 32

## 2025-02-08 ASSESSMENT — PAIN SCALES - WONG BAKER: WONGBAKER_NUMERICALRESPONSE: NO HURT

## 2025-02-08 NOTE — PROGRESS NOTES
Clive Woody is a 89 y.o. male on day 3 of admission presenting with Pneumonia, unspecified organism.      Subjective   Non verbal with poor eye contact - does not answer yes or no questions        Objective     Last Recorded Vitals  /67 (BP Location: Right arm, Patient Position: Lying)   Pulse 76   Temp 36.5 °C (97.7 °F) (Temporal)   Resp 16   Wt 83.9 kg (185 lb)   SpO2 97%   Intake/Output last 3 Shifts:    Intake/Output Summary (Last 24 hours) at 2/8/2025 0956  Last data filed at 2/8/2025 0938  Gross per 24 hour   Intake 1666.25 ml   Output 900 ml   Net 766.25 ml       Admission Weight  Weight: 83.9 kg (185 lb) (02/04/25 2102)    Daily Weight  02/04/25 : 83.9 kg (185 lb)    Image Results  CT head wo IV contrast  Narrative: Interpreted By:  Osman Maria,   STUDY:  CT HEAD WO IV CONTRAST;  2/5/2025 12:40 pm      INDICATION:  Signs/Symptoms:Altered mental status also septic..          COMPARISON:  11/19/2020      ACCESSION NUMBER(S):  [id number]      ORDERING CLINICIAN:  CANDICE LOERA      TECHNIQUE:  Noncontrast axial CT scan of head was performed. Angled reformats in  brain and bone windows were generated. The images were reviewed in  bone, brain, blood and soft tissue windows.      FINDINGS:  The ventricles, cisterns and sulci are prominent, consistent with  mild diffuse volume loss. There are areas of nonspecific white matter  hypodensity, which are probably age-related or microvascular in  nature.      Gray-white differentiation is intact and there is no evidence of  acute cortical infarct. No mass, mass effect or midline shift is  seen. There is no evidence of hemorrhage.      The visualized paranasal sinuses are remarkable for likely minimal  mucosal thickening or fluid in the left maxillary sinus.          Impression: No evidence of acute cortical infarct or intracranial hemorrhage.      Minimal mucosal thickening in the left maxillary sinus.      MACRO:  None      Signed by: Osman Maria  2/5/2025 1:07 PM  Dictation workstation:   [id number]  ECG 12 lead  Sinus rhythm with 1st degree AV block  Nonspecific T wave abnormality  Abnormal ECG  When compared with ECG of 29-MAR-2022 21:20,  AK interval has increased  T wave inversion now evident in Inferior leads  CT chest abdomen pelvis w IV contrast  Narrative: Interpreted By:  Rell Alfonso,   STUDY:  CT CHEST ABDOMEN PELVIS W IV CONTRAST;  2/4/2025 11:00 pm      INDICATION:  Signs/Symptoms:vomiting      COMPARISON:  CT abdomen pelvis 03/29/2022.      ACCESSION NUMBER(S):  [id number]      ORDERING CLINICIAN:  DEION RODRIGUES      TECHNIQUE:  CT of the chest, abdomen, and pelvis was performed.  Contiguous axial images were obtained through the chest, abdomen and  pelvis. Coronal and sagittal reconstructions were performed.      The images were obtained in the portal venous phase.      75 ml of contrast material Omnipaque 350 were administered  intravenously without immediate complication.      FINDINGS:  CHEST:      LUNG/PLEURA/LARGE AIRWAYS:  There are no pleural effusions. There is no evidence of pneumothorax.  There are no consolidations. The tracheobronchial tree is patent.      There are bibasilar clustered ground-glass opacities suggestive of  infectious or inflammatory bronchiolitis.      VESSELS:  No traumatic aortic injury is appreciated within the limitations of  this non-EKG gated study.  The thoracic aorta is of normal course and  caliber.      Main pulmonary artery and its branches are normal in caliber.      HEART:  The heart is normal in size.   There is no pericardial effusion.      MEDIASTINUM AND BRODERICK:  No pneumomediastinum, abnormal mediastinal fluid collection or  mediastinal hematoma is appreciated. No mediastinal, hilar or  axillary adenopathy is present.      Esophagus is mildly distended with debris.      CHEST WALL AND LOWER NECK:  No acute fracture or dislocation of the included osseous structures  are appreciated.  No suspicious  osseous lesions are identified.  The  thoracic wall soft tissues are within normal limits.      ABDOMEN:      LIVER:  The liver is normal in size and contour. There is no subcapsular  hematoma, no perihepatic fluid collection.  There are no suspicious  hepatic lesions.      GALLBLADDER:  The gallbladder is nondistended, without evidence of radiopaque stone.      BILE DUCTS:  The intahepatic and extrahepatic bile ducts are not dilated.      PANCREAS:  Mild dilatation of the pancreatic duct measuring up to 6 mm in  caliber new from prior, however no evidence of peripancreatic edema  or mass.      SPLEEN:  No parenchymal perfusion deficit of the spleen is appreciated to  suggest contusion or laceration. There is no subcapsular hematoma, no  perisplenic fluid collection.      ADRENAL GLANDS:  The bilateral adrenal glands are unremarkable in appearance.      KIDNEYS AND URETERS:  No parenchymal perfusion deficit is appreciated in bilateral kidneys  to suggest contusion or laceration. There is no subcapsular hematoma,  no perinephric fluid collection. The kidneys are normal in size.  There is no hydronephrosis. There is no nephrolithiasis. 1.5 cm right  upper pole renal cyst similar to prior.      PELVIS:      BLADDER:  The urinary bladder is grossly normal.      REPRODUCTIVE ORGANS:  Enlarged prostate measuring 5.2 x 6.5 cm axially protruding into the  bladder apex.      BOWEL:  Small hiatal hernia. The stomach is unremarkable.  The small bowel is  normal in caliber without evidence of focal wall thickening or  obstruction.  There is no evidence of focal wall thickening or  dilatation of the large bowel.  Moderate to large amount of formed  stool throughout the colon and rectum without evidence of wall  thickening or inflammatory change. Appendix not visualized without  secondary signs of appendicitis.      VESSELS:  The aorta and IVC are within normal limits.  The principal  vasculature of the abdomen and pelvis is  patent.      PERITONEUM/RETROPERITONEUM/LYMPH NODES:  There is no evidence of intra- or retroperitoneal hematoma.  There is  no free or loculated fluid collection, no free intraperitoneal air.  No abdominopelvic lymphadenopathy is present.      BONES AND ABDOMINAL WALL:  No evidence of acute fracture or dislocation of the included osseous  structures.  No suspicious osseous lesions are identified.  There is  a right inguinal hernia containing small bowel new from prior,  however the bowel is collapsed without evidence of incarceration or  obstruction.      Impression: CHEST  1.  Clustered ground-glass opacities in the bilateral lower lobes  suspicious for acute infectious or inflammatory bronchiolitis.  Distended thoracic esophagus filled with debris.      ABDOMEN - PELVIS  1.  Evaluation of lower abdomen significantly limited by motion  artifact. There is moderate to large amount of formed stool  throughout the colon rectum without evidence of wall thickening or  inflammatory change. Correlation with constipation is recommended.  Right inguinal hernia containing loops of small bowel new from 2022,  however without bowel obstruction or incarceration.  2. Marked prostatomegaly.  3. Mild dilatation of the pancreatic duct without peripancreatic  edema is nonspecific and correlation with pancreatic enzymes is  recommended.          Signed by: Rell Alfonso 2/5/2025 12:26 AM  Dictation workstation:   [id number]      Physical Exam  Constitutional:       Appearance: Normal appearance.   HENT:      Head: Normocephalic.      Mouth/Throat:      Mouth: Mucous membranes are moist.   Eyes:      Extraocular Movements: Extraocular movements intact.   Cardiovascular:      Rate and Rhythm: Normal rate.      Pulses: Normal pulses.      Heart sounds: Normal heart sounds.   Pulmonary:      Effort: Pulmonary effort is normal.      Breath sounds: Rhonchi present.   Abdominal:      General: Bowel sounds are normal.      Palpations:  Abdomen is soft.   Musculoskeletal:         General: Normal range of motion.      Cervical back: Normal range of motion.   Skin:     General: Skin is warm and dry.      Capillary Refill: Capillary refill takes less than 2 seconds.   Neurological:      General: No focal deficit present.      Mental Status: He is alert. Mental status is at baseline.   Psychiatric:      Comments: Non verbal          Relevant Results           Scheduled medications  atorvastatin, 5 mg, oral, Daily  bisacodyl, 10 mg, rectal, Daily  busPIRone, 5 mg, oral, BID  cefTRIAXone, 1 g, intravenous, q24h  cholecalciferol, 5,000 Units, oral, Daily  docusate sodium, 100 mg, oral, BID  empagliflozin, 10 mg, oral, Daily  enoxaparin, 40 mg, subcutaneous, q24h  finasteride, 5 mg, oral, Daily  glipiZIDE, 5 mg, oral, Daily  insulin glargine, 10 Units, subcutaneous, Nightly  insulin lispro, 0-10 Units, subcutaneous, TID AC  levothyroxine, 88 mcg, oral, Daily  lisinopril, 10 mg, oral, Daily  LORazepam, 0.5 mg, oral, Daily before lunch  [Held by provider] metFORMIN, 1,000 mg, oral, BID  OXcarbazepine, 300 mg, oral, BID  pantoprazole, 40 mg, oral, Daily before breakfast   Or  pantoprazole, 40 mg, intravenous, Daily before breakfast  polyethylene glycol, 17 g, oral, Daily  sennosides, 1 tablet, oral, Daily  tamsulosin, 0.4 mg, oral, Daily      Continuous medications     PRN medications  PRN medications: acetaminophen **OR** acetaminophen **OR** acetaminophen, acetaminophen **OR** acetaminophen **OR** acetaminophen, alum-mag hydroxide-simeth, benzocaine-menthol, dextromethorphan-guaifenesin, dextrose, dextrose, glucagon, glucagon, guaiFENesin, hydrALAZINE, ipratropium-albuteroL, melatonin, ondansetron ODT **OR** ondansetron, oxygen  .rnt  CT head wo IV contrast    Result Date: 2/5/2025  Interpreted By:  Osman Maria, STUDY: CT HEAD WO IV CONTRAST;  2/5/2025 12:40 pm   INDICATION: Signs/Symptoms:Altered mental status also septic..     COMPARISON: 11/19/2020    ACCESSION NUMBER(S): [id number]   ORDERING CLINICIAN: CANDICE LOERA   TECHNIQUE: Noncontrast axial CT scan of head was performed. Angled reformats in brain and bone windows were generated. The images were reviewed in bone, brain, blood and soft tissue windows.   FINDINGS: The ventricles, cisterns and sulci are prominent, consistent with mild diffuse volume loss. There are areas of nonspecific white matter hypodensity, which are probably age-related or microvascular in nature.   Gray-white differentiation is intact and there is no evidence of acute cortical infarct. No mass, mass effect or midline shift is seen. There is no evidence of hemorrhage.   The visualized paranasal sinuses are remarkable for likely minimal mucosal thickening or fluid in the left maxillary sinus.         No evidence of acute cortical infarct or intracranial hemorrhage.   Minimal mucosal thickening in the left maxillary sinus.   MACRO: None   Signed by: Osman Maria 2/5/2025 1:07 PM Dictation workstation:   [id number]    ECG 12 lead    Result Date: 2/5/2025  Sinus rhythm with 1st degree AV block Nonspecific T wave abnormality Abnormal ECG When compared with ECG of 29-MAR-2022 21:20, VT interval has increased T wave inversion now evident in Inferior leads    CT chest abdomen pelvis w IV contrast    Result Date: 2/5/2025  Interpreted By:  Rell Alfonso, STUDY: CT CHEST ABDOMEN PELVIS W IV CONTRAST;  2/4/2025 11:00 pm   INDICATION: Signs/Symptoms:vomiting   COMPARISON: CT abdomen pelvis 03/29/2022.   ACCESSION NUMBER(S): [id number]   ORDERING CLINICIAN: DEION RODRIGUES   TECHNIQUE: CT of the chest, abdomen, and pelvis was performed. Contiguous axial images were obtained through the chest, abdomen and pelvis. Coronal and sagittal reconstructions were performed.   The images were obtained in the portal venous phase.   75 ml of contrast material Omnipaque 350 were administered intravenously without immediate complication.   FINDINGS: CHEST:    LUNG/PLEURA/LARGE AIRWAYS: There are no pleural effusions. There is no evidence of pneumothorax. There are no consolidations. The tracheobronchial tree is patent.   There are bibasilar clustered ground-glass opacities suggestive of infectious or inflammatory bronchiolitis.   VESSELS: No traumatic aortic injury is appreciated within the limitations of this non-EKG gated study.  The thoracic aorta is of normal course and caliber.   Main pulmonary artery and its branches are normal in caliber.   HEART: The heart is normal in size.   There is no pericardial effusion.   MEDIASTINUM AND BRODERICK: No pneumomediastinum, abnormal mediastinal fluid collection or mediastinal hematoma is appreciated. No mediastinal, hilar or axillary adenopathy is present.   Esophagus is mildly distended with debris.   CHEST WALL AND LOWER NECK: No acute fracture or dislocation of the included osseous structures are appreciated.  No suspicious osseous lesions are identified.  The thoracic wall soft tissues are within normal limits.   ABDOMEN:   LIVER: The liver is normal in size and contour. There is no subcapsular hematoma, no perihepatic fluid collection.  There are no suspicious hepatic lesions.   GALLBLADDER: The gallbladder is nondistended, without evidence of radiopaque stone.   BILE DUCTS: The intahepatic and extrahepatic bile ducts are not dilated.   PANCREAS: Mild dilatation of the pancreatic duct measuring up to 6 mm in caliber new from prior, however no evidence of peripancreatic edema or mass.   SPLEEN: No parenchymal perfusion deficit of the spleen is appreciated to suggest contusion or laceration. There is no subcapsular hematoma, no perisplenic fluid collection.   ADRENAL GLANDS: The bilateral adrenal glands are unremarkable in appearance.   KIDNEYS AND URETERS: No parenchymal perfusion deficit is appreciated in bilateral kidneys to suggest contusion or laceration. There is no subcapsular hematoma, no perinephric fluid collection.  The kidneys are normal in size. There is no hydronephrosis. There is no nephrolithiasis. 1.5 cm right upper pole renal cyst similar to prior.   PELVIS:   BLADDER: The urinary bladder is grossly normal.   REPRODUCTIVE ORGANS: Enlarged prostate measuring 5.2 x 6.5 cm axially protruding into the bladder apex.   BOWEL: Small hiatal hernia. The stomach is unremarkable.  The small bowel is normal in caliber without evidence of focal wall thickening or obstruction.  There is no evidence of focal wall thickening or dilatation of the large bowel.  Moderate to large amount of formed stool throughout the colon and rectum without evidence of wall thickening or inflammatory change. Appendix not visualized without secondary signs of appendicitis.   VESSELS: The aorta and IVC are within normal limits.  The principal vasculature of the abdomen and pelvis is patent.   PERITONEUM/RETROPERITONEUM/LYMPH NODES: There is no evidence of intra- or retroperitoneal hematoma.  There is no free or loculated fluid collection, no free intraperitoneal air. No abdominopelvic lymphadenopathy is present.   BONES AND ABDOMINAL WALL: No evidence of acute fracture or dislocation of the included osseous structures.  No suspicious osseous lesions are identified.  There is a right inguinal hernia containing small bowel new from prior, however the bowel is collapsed without evidence of incarceration or obstruction.       CHEST 1.  Clustered ground-glass opacities in the bilateral lower lobes suspicious for acute infectious or inflammatory bronchiolitis. Distended thoracic esophagus filled with debris.   ABDOMEN - PELVIS 1.  Evaluation of lower abdomen significantly limited by motion artifact. There is moderate to large amount of formed stool throughout the colon rectum without evidence of wall thickening or inflammatory change. Correlation with constipation is recommended. Right inguinal hernia containing loops of small bowel new from 2022, however without  bowel obstruction or incarceration. 2. Marked prostatomegaly. 3. Mild dilatation of the pancreatic duct without peripancreatic edema is nonspecific and correlation with pancreatic enzymes is recommended.     Signed by: Rell Alfonso 2/5/2025 12:26 AM Dictation workstation:   [id number]       Assessment/Plan      Assessment & Plan  Pneumonia, unspecified organism    Dementia    Other schizophrenia    Benign hypertension    Diabetes mellitus without complication (Multi)    ASHD (arteriosclerotic heart disease)    Benign prostatic hyperplasia without urinary obstruction    Acute hypoxic respiratory failure (Multi)    Fever    Nausea & vomiting    Constipation    Acute cystitis without hematuria    Hypothyroidism    Vitamin D deficiency    Anxiety    BARBARA (acute kidney injury) (CMS-[facility])    #Acute hypoxic respiratory failure, resolved   #Pneumonia, community acquired  #Fever, resolved   - Brought to ED after episode of emesis at [facility]  - TMax 39.1C in ED, Tylenol suppository given with improvement  - Required 3L O2 in the ED, not on home O2  - CT chest: Clustered ground-glass opacities in the bilateral lower lobes   suspicious for acute infectious or inflammatory bronchiolitis. Distended thoracic esophagus filled with debris.   - WBC 17.2 > 15.4   - COVID, flu negative   - MRSA nares negative   - Procal 12.89 , Bcx2 - no growth x 2 days  - Sputum culture if able   - Continue azithromycin(3 total) and ceftriaxone (day 4)- no recent hospital admissions   - DuoNebs TID  - Tylenol PRN for fever   - RT eval and treat protocol  - Bronchial hygiene  - Monitor SpO2 continuously   - Baseline O2 None   - Wean O2 as tolerated; patient currently on RA  - SLP consulted, recommending pureed diet/regular thin liquids - stopped IV 2/7 when po intake finally began to increase   2/8 recheck procal and adjust abx accordingly prior to discharge (complete 5 days ceftriaxone prior to discharge with such poor po intake)      #Nausea and  vomiting, resolved  #Constipation  - CT A/P:  Evaluation of lower abdomen significantly limited by motion   artifact. There is moderate to large amount of formed stool   throughout the colon rectum without evidence of wall thickening or   inflammatory change. Correlation with constipation is recommended.   Right inguinal hernia containing loops of small bowel new from 2022,   however without bowel obstruction or incarceration.   - No emesis since admission  - Continue Colace 100 mg BID, Miralax 17 g every day  - Continue Senokot 8.6 mg at bedtime   - Bisacodyl 10 mg suppository every day   - Diet as tolerated  - Zofran PRN for nausea/vomiting   - Monitor for BM (2/7 and 2/8 with bm)     #BARBARA  - Cr 1.20 > 1.63> 1.16  today  - Patient did receive IV contrast and Toradol in the ED on 2/5   - Lisinopril held today  - Start NS @ 75 ml/hr - discontinued 2/7/25   - Encourage PO intake as tolerated   - Renally dose meds  - Daily BMP     #UTI  - UA: 250 leuk esterase, 21-50 WBCs, 6-10 RBCs, budding yeast  - Ucx 20,000 - 80,000 CFU/mL Candida glabrata, BCx no growth x 2 days - likely related to jardiance plus incontinence   - Received Zosyn in the ED  - Continue ceftriaxone (day 4)  - Adjust ATBs as needed pending final culture results     #HLD  #HTN  - Trop 8  - Continue atorvastatin 5 mg every day  - Lisinopril held 2/2 BARBARA; BP well controlled today 138/90  - Telemetry monitoring  - Monitor BP     #Hypothyroidism   - Continue levothyroxine 88 mcg every day     #T2DM  - Continue empagliflozin 10 mg every day, glipizide 5 mg every day, Lantus 10 units at bedtime  - Metformin held; patient received IV contrast on 2/5   - SSI three times a day before meals   - Hypoglycemia protocol  - Accuchecks ac/hs/prn  - Diabetic diet      #BPH  - Continue tamsulosin 0.4 mg every day, finasteride 5 mg every day  - Bladder scan PRN     #Schizophrenia  #Anxiety  #Dementia    - Continue buspirone 5 mg BID, lorazepam 0.5 mg every day,  oxcarbazepine 300 mg BID   - Delirium precautions: Frequent reorientation, day/night normalization, shades open for sunlight, provide glasses/hearing aids as needed  - From [facility]; likely return on discharge   - PT/OT evals      #Vitamin D deficiency   - Continue cholecalciferol 5000 units every day     DVT PPx: Lovenox   GI PPx: Protonix   Diet: Consistent carb     Code Status: Full               Elaine Pringle, APRN-CNP

## 2025-02-08 NOTE — CARE PLAN
The patient's goals for the shift include  rest to promote healing    The clinical goals for the shift include free from fall or injury this shift    Over the shift, the patient did make progress toward the following goals.       Problem: Fall/Injury  Goal: Not fall by end of shift  Outcome: Progressing  Goal: Be free from injury by end of the shift  Outcome: Progressing  Goal: Verbalize understanding of personal risk factors for fall in the hospital  Outcome: Progressing  Goal: Verbalize understanding of risk factor reduction measures to prevent injury from fall in the home  Outcome: Progressing  Goal: Use assistive devices by end of the shift  Outcome: Progressing  Goal: Pace activities to prevent fatigue by end of the shift  Outcome: Progressing     Problem: Respiratory  Goal: Clear secretions with interventions this shift  Outcome: Progressing  Goal: Minimize anxiety/maximize coping throughout shift  Outcome: Progressing  Goal: Minimal/no exertional discomfort or dyspnea this shift  Outcome: Progressing  Goal: No signs of respiratory distress (eg. Use of accessory muscles. Peds grunting)  Outcome: Progressing  Goal: Patent airway maintained this shift  Outcome: Progressing  Goal: Verbalize decreased shortness of breath this shift  Outcome: Progressing  Goal: Wean oxygen to maintain O2 saturation per order/standard this shift  Outcome: Progressing  Goal: Increase self care and/or family involvement in next 24 hours  Outcome: Progressing     Problem: Pain - Adult  Goal: Verbalizes/displays adequate comfort level or baseline comfort level  Outcome: Progressing     Problem: Safety - Adult  Goal: Free from fall injury  Outcome: Progressing     Problem: Discharge Planning  Goal: Discharge to home or other facility with appropriate resources  Outcome: Progressing     Problem: Chronic Conditions and Co-morbidities  Goal: Patient's chronic conditions and co-morbidity symptoms are monitored and maintained or  improved  Outcome: Progressing     Problem: Nutrition  Goal: Nutrient intake appropriate for maintaining nutritional needs  Outcome: Progressing     Problem: Diabetes  Goal: Achieve decreasing blood glucose levels by end of shift  Outcome: Progressing  Goal: Increase stability of blood glucose readings by end of shift  Outcome: Progressing  Goal: Decrease in ketones present in urine by end of shift  Outcome: Progressing  Goal: Maintain electrolyte levels within acceptable range throughout shift  Outcome: Progressing  Goal: Maintain glucose levels >70mg/dl to <250mg/dl throughout shift  Outcome: Progressing  Goal: No changes in neurological exam by end of shift  Outcome: Progressing  Goal: Learn about and adhere to nutrition recommendations by end of shift  Outcome: Progressing  Goal: Vital signs within normal range for age by end of shift  Outcome: Progressing  Goal: Increase self care and/or family involovement by end of shift  Outcome: Progressing  Goal: Receive DSME education by end of shift  Outcome: Progressing

## 2025-02-08 NOTE — NURSING NOTE
Pt refusing oral medication. BP elevated- provider notified, PRN given with positive results, no BM at this time, in bed sleeping on and off, bed alarm on, call light in reach

## 2025-02-08 NOTE — PROGRESS NOTES
Occupational Therapy    Occupational Therapy Treatment    Name: Clive Woody  MRN: [id number]  Department: [facility]  Room: [address] [place] [address]  Date: 02/08/25  Time Calculation  Start Time: 0956  Stop Time: 1028  Time Calculation (min): 32 min    Assessment:  OT Assessment: Pt. displays inconsistencies with participation in UE self care performance at this time requiring maxA & cues for grooming, maxA & cues for UE bathing & maxA & cues for UE dressing(Donning a hospital gown).  Pt. did however demonsrate improvement with sit to stand transfers from the bedside chair to modA & cues.  The pt. was also able to perform static standing balance with Shantel to modA & cues while caregiver assisted the pt. with perineal hygiene care & tab style brief donning.  Prognosis: Fair  Barriers to Discharge Home: No anticipated barriers  End of Session Communication: Bedside nurse  End of Session Patient Position: Up in chair, Alarm on  Plan:  Treatment Interventions: ADL retraining, Functional transfer training, Cognitive reorientation, Patient/family training, Equipment evaluation/education, Neuromuscular reeducation, Compensatory technique education  OT Frequency: 3 times per week  OT Discharge Recommendations: Moderate intensity level of continued care  OT Recommended Transfer Status: ModAx1 & cues for sit to stand  OT - OK to Discharge: Yes Based on completed evaluation and care plan recommendations, no barriers to discharge to next site of care      Subjective   Previous Visit Info:  OT Last Visit  OT Received On: 02/08/25  General:  General  Reason for Referral: Pt is a 89 yo M referred to occupational therapy for impaired self-care and functional mobility 2/2 hospitalization for pneumonia.  Past Medical History Relevant to Rehab: Dementia, hypertension, acute hypoxic respiratory failure, hypothyroidism, anxiety, asthnia, BARBARA,  Family/Caregiver Present: No  Prior to Session Communication: Bedside nurse  Patient Position Received: Up in  chair, Alarm on  General Comment: teleemtry, non verbal  Precautions:  Medical Precautions: Fall precautions  Precautions Comment: safety precautions    Pain Assessment:  Pain Assessment  Pain Assessment: Unable to self-report     Objective   Cognition:  Overall Cognitive Status: Impaired at baseline  Arousal/Alertness: Appropriate responses to stimuli  Orientation Level: Unable to assess  Safety/Judgement:Max cues for task initaition, safety & sequencing.  Activities of Daily Living: Grooming  Grooming Level of Assistance: Maximum assistance & cues.  Pt. was able to assist minimally with washing his face after provided with wash cloth placement in his R along with vc's & tactile cues for task initiation.  Grooming Comments: Attempted to engage the pt. in oral hygiene care, however the pt. turned his head when presented with the tootbrush placed up at his mouth.  Pt. also did not engage in hair combing when cued to do so after presenting a comb in his R hand with demonstration of task.    UE Bathing  UE Bathing Level of Assistance: Maximum assistance  UE Bathing Where Assessed: Bedside chair.  UE Bathing Comments: Despite vc's & tactile cues provided, the pt. did not attempt to participate in UE bathing at this time.    UE Dressing  UE Dressing Level of Assistance: MaxA & cues as the pt. assisted minimally with B UE placement into the hospital gown  UE Dressing Where Assessed: Chair    Bed Mobility/Transfers: Transfers  Transfer: Yes  Transfer 1  Transfer From 1: Sit to, Stand to  Technique 1: Sit to stand, Stand to sit  Transfer Device 1: Walker, Gait belt  Transfer Level of Assistance 1:  ModA & cues  Trials/Comments 1: Max vc's & tactile cues for task initiation & safety performed multiple times.    Standing Balance:  Static Standing Balance  Static Standing-Level of Assistance: Kelly to modA & cues for static standing balance while caregiver assisted the pt. with self care performance.    Outcome Measures:  [facility]  Daily Activity  Putting on and taking off regular lower body clothing: Total  Bathing (including washing, rinsing, drying): Total  Putting on and taking off regular upper body clothing: A lot  Toileting, which includes using toilet, bedpan or urinal: Total  Taking care of personal grooming such as brushing teeth: A lot  Eating Meals: A little  Daily Activity - Total Score: 10      Education Documentation  Body Mechanics, taught by Amanda Laird OT at 2/8/2025  1:00 PM.  Learner: Patient  Readiness: Acceptance  Method: Explanation, Demonstration  Response: Needs Reinforcement  Comment: Education on safety with transfers & safety with standing balance.    ADL Training, taught by Amanda Laird OT at 2/8/2025  1:00 PM.  Learner: Patient  Readiness: Acceptance  Method: Explanation, Demonstration  Response: Needs Reinforcement  Comment: Education on safety with transfers & safety with standing balance.    Goals:  Encounter Problems       Encounter Problems (Active)       ADLs       Patient will perform UB bathing with minimal assist  level of assistance and PRN bathroom equipment.       Start:  02/07/25    Expected End:  02/21/25            Patient with complete upper body dressing with minimal assist  level of assistance donning and doffing all UE clothes with PRN adaptive equipment while supported sitting or edge of bed  (Progressing)       Start:  02/07/25    Expected End:  02/21/25            Patient with complete lower body dressing with minimal assist  level of assistance donning and doffing all LE clothes  with PRN adaptive equipment while supported sitting and standing       Start:  02/07/25    Expected End:  02/21/25            Patient will complete daily grooming tasks with minimal assist  level of assistance and PRN adaptive equipment while supported sitting. (Progressing)       Start:  02/07/25    Expected End:  02/21/25            Patient will complete toileting including hygiene clothing  management/hygiene with minimal assist  level of assistance and PRN bathroom equipment.       Start:  02/07/25    Expected End:  02/21/25               BALANCE       Pt will maintain dynamic seated balance during ADL task with stand by assist level of assistance in order to demonstrate decreased risk of falling and improved postural control.       Start:  02/07/25    Expected End:  02/21/25               TRANSFERS       Patient will perform bed mobility minimal assist  level of assistance and bed rails in order to improve safety and independence with mobility       Start:  02/07/25    Expected End:  02/21/25            Patient will complete functional transfer to chair/toilet/BSC with least restrictive device with minimal assist  level of assistance. (Progressing)       Start:  02/07/25    Expected End:  02/21/25

## 2025-02-08 NOTE — PROGRESS NOTES
Physical Therapy    Physical Therapy Treatment    Patient Name: Clive Woody  MRN: [id number]  Department: [facility]  Room: [address] [place] [address]  Today's Date: 2/8/2025  Time Calculation  Start Time: 0903  Stop Time: 0934  Time Calculation (min): 31 min         Assessment/Plan   PT Assessment  Barriers to Discharge Home: Cognition needs  Cognition Needs: 24hr supervision for safety awareness needed  Barriers to Participation: Insight into problems  End of Session Communication: Bedside nurse  End of Session Patient Position: Up in chair (alarm on)  PT Plan  Inpatient/Swing Bed or Outpatient: Inpatient  PT Plan  Treatment/Interventions: Transfer training, Gait training, Strengthening  PT Plan: Ongoing PT  PT Frequency: 3 times per week  PT Discharge Recommendations: Moderate intensity level of continued care  PT Recommended Transfer Status: Assist x2  PT - OK to Discharge: Yes      General Visit Information:   PT  Visit  PT Received On: 02/08/25  Response to Previous Treatment: Patient unable to report, no changes reported from family or staff  General  Reason for Referral: pt. is a 89 yo male referred to PT  for impaired mobility  Past Medical History Relevant to Rehab: dimentia, aphasic, seizures  Family/Caregiver Present: No  Prior to Session Communication: Bedside nurse  Patient Position Received: Up in chair  General Comment: Pt. non-verbal, does not respond to , or indicate understanding of directions given, but will at times mimic what is demonstrated and walk with assist.    Subjective  Pt non-verbal, up in chair upon arrival.  Precautions:  Precautions  STEADI Fall Risk Score (The score of 4 or more indicates an increased risk of falling): fall risk  Precautions Comment: impaired cognition, judgement and safety awareness      Objective   Pain:  Pain Assessment  Pain Assessment: Unable to self-report (no physical signs of pain.)  Cognition:  Cognition  Overall Cognitive Status: Impaired at baseline  Arousal/Alertness:  Unable to assess  Safety/Judgement:  (impaired)  Postural Control:   Able to sit up unsupported.  Activity Tolerance:   Tolerated all we could do with no apparent stress.  Treatments:  Therapeutic Exercise  Therapeutic Exercise Performed: Yes  Therapeutic Exercise Activity 1: LAQ x 10 ea. with multiple VCs, TCs  Therapeutic Exercise Activity 2: marching x 5 with multiple  TCs, Vcs    Therapeutic Activity  Therapeutic Activity Performed: Yes  Therapeutic Activity 1: spent time changing pt. and clothing after he spilled liquid on himself. attempted to get pt. to assist, but he did not respond.    Ambulation/Gait Training  Ambulation/Gait Training Performed: Yes  Ambulation/Gait Training 1  Surface 1: Level tile  Device 1: Rolling walker  Gait Support Devices: Gait belt  Assistance 1: Contact guard (x2)  Quality of Gait 1: Shuffling gait, Inconsistent stride length  Comments/Distance (ft) 1: 30 ft.    Outcome Measures:  [facility] Basic Mobility  Turning from your back to your side while in a flat bed without using bedrails: A lot  Moving from lying on your back to sitting on the side of a flat bed without using bedrails: A lot  Moving to and from bed to chair (including a wheelchair): A lot  Standing up from a chair using your arms (e.g. wheelchair or bedside chair): A little  To walk in hospital room: A little  Climbing 3-5 steps with railing: A lot  Basic Mobility - Total Score: 14    Education Documentation  Handouts, taught by Donna Hatfield PTA at 2/8/2025 12:27 PM.  Learner: Patient  Readiness: Nonacceptance  Method: Explanation, Demonstration  Response: Needs Reinforcement, No Evidence of Learning  Comment: tried to teach ther-ex, mobility , and gait safety. Pt  did not respond or demonstrate understanding.    Precautions, taught by Donna Hatfield PTA at 2/8/2025 12:27 PM.  Learner: Patient  Readiness: Nonacceptance  Method: Explanation, Demonstration  Response: Needs Reinforcement, No Evidence of  Learning  Comment: tried to teach ther-ex, mobility , and gait safety. Pt  did not respond or demonstrate understanding.    Body Mechanics, taught by Donna Hatfield PTA at 2/8/2025 12:27 PM.  Learner: Patient  Readiness: Nonacceptance  Method: Explanation, Demonstration  Response: Needs Reinforcement, No Evidence of Learning  Comment: tried to teach ther-ex, mobility , and gait safety. Pt  did not respond or demonstrate understanding.    Home Exercise Program, taught by Donna Hatfield PTA at 2/8/2025 12:27 PM.  Learner: Patient  Readiness: Nonacceptance  Method: Explanation, Demonstration  Response: Needs Reinforcement, No Evidence of Learning  Comment: tried to teach ther-ex, mobility , and gait safety. Pt  did not respond or demonstrate understanding.    Mobility Training, taught by Donna Hatfield PTA at 2/8/2025 12:27 PM.  Learner: Patient  Readiness: Nonacceptance  Method: Explanation, Demonstration  Response: Needs Reinforcement, No Evidence of Learning  Comment: tried to teach ther-ex, mobility , and gait safety. Pt  did not respond or demonstrate understanding.    Education Comments  No comments found.        Encounter Problems       Encounter Problems (Active)       Balance       STG - Maintains static sitting balance with upper extremity support for 5 min with supervision       Start:  02/07/25    Expected End:  02/21/25       INTERVENTIONS:  1. Practice sitting on the edge of a bed/mat with minimal support.  2. Educate patient about maintining total hip precautions while maintaining balance.  3. Educate patient about pressure relief.  4. Educate patient about use of assistive device.            Mobility       STG - Patient will ambulate 5-10 feet with approp a device with mod a 2       Start:  02/07/25    Expected End:  02/21/25               PT Transfers       STG - Transfer from bed to chair with mod a 1-2       Start:  02/07/25    Expected End:  02/21/25            STG - Patient to transfer to and  from sit to supine min 1       Start:  02/07/25    Expected End:  02/21/25            STG - Patient will transfer sit to and from stand with mod 1-2       Start:  02/07/25    Expected End:  02/21/25               Pain - Adult

## 2025-02-08 NOTE — NURSING NOTE
0326: Assumed care of pt. Resting in bed with eyes closed. Respirations are even and unlabored. Seizure pads in place. Bed alarm on, call bell in reach.     0850: pt up to chair with x2 min assist with direction. Chair alarm on. Pills crushed and placed in orange juice. Pt drank all of juice. Breakfast set up in front of pt. Call bell in reach

## 2025-02-09 VITALS
HEART RATE: 87 BPM | RESPIRATION RATE: 16 BRPM | SYSTOLIC BLOOD PRESSURE: 128 MMHG | OXYGEN SATURATION: 94 % | BODY MASS INDEX: 28.04 KG/M2 | WEIGHT: 185 LBS | DIASTOLIC BLOOD PRESSURE: 75 MMHG | HEIGHT: 68 IN | TEMPERATURE: 97.5 F

## 2025-02-09 LAB
ANION GAP SERPL CALC-SCNC: 13 MMOL/L (ref 10–20)
BACTERIA BLD CULT: NORMAL
BACTERIA BLD CULT: NORMAL
BASOPHILS # BLD AUTO: 0.03 X10*3/UL (ref 0–0.1)
BASOPHILS NFR BLD AUTO: 0.5 %
BUN SERPL-MCNC: 20 MG/DL (ref 6–23)
CALCIUM SERPL-MCNC: 8.4 MG/DL (ref 8.6–10.3)
CHLORIDE SERPL-SCNC: 102 MMOL/L (ref 98–107)
CO2 SERPL-SCNC: 23 MMOL/L (ref 21–32)
CREAT SERPL-MCNC: 0.83 MG/DL (ref 0.5–1.3)
EGFRCR SERPLBLD CKD-EPI 2021: 83 ML/MIN/1.73M*2
EOSINOPHIL # BLD AUTO: 0.07 X10*3/UL (ref 0–0.4)
EOSINOPHIL NFR BLD AUTO: 1.1 %
ERYTHROCYTE [DISTWIDTH] IN BLOOD BY AUTOMATED COUNT: 14.7 % (ref 11.5–14.5)
GLUCOSE SERPL-MCNC: 71 MG/DL (ref 74–99)
HCT VFR BLD AUTO: 35.7 % (ref 41–52)
HGB BLD-MCNC: 11.4 G/DL (ref 13.5–17.5)
IMM GRANULOCYTES # BLD AUTO: 0.03 X10*3/UL (ref 0–0.5)
IMM GRANULOCYTES NFR BLD AUTO: 0.5 % (ref 0–0.9)
LYMPHOCYTES # BLD AUTO: 1.53 X10*3/UL (ref 0.8–3)
LYMPHOCYTES NFR BLD AUTO: 24.5 %
MCH RBC QN AUTO: 27.9 PG (ref 26–34)
MCHC RBC AUTO-ENTMCNC: 31.9 G/DL (ref 32–36)
MCV RBC AUTO: 87 FL (ref 80–100)
MONOCYTES # BLD AUTO: 0.35 X10*3/UL (ref 0.05–0.8)
MONOCYTES NFR BLD AUTO: 5.6 %
NEUTROPHILS # BLD AUTO: 4.23 X10*3/UL (ref 1.6–5.5)
NEUTROPHILS NFR BLD AUTO: 67.8 %
NRBC BLD-RTO: 0 /100 WBCS (ref 0–0)
PLATELET # BLD AUTO: 258 X10*3/UL (ref 150–450)
POTASSIUM SERPL-SCNC: 4.1 MMOL/L (ref 3.5–5.3)
RBC # BLD AUTO: 4.09 X10*6/UL (ref 4.5–5.9)
SODIUM SERPL-SCNC: 134 MMOL/L (ref 136–145)
WBC # BLD AUTO: 6.2 X10*3/UL (ref 4.4–11.3)

## 2025-02-09 PROCEDURE — 2500000004 HC RX 250 GENERAL PHARMACY W/ HCPCS (ALT 636 FOR OP/ED): Mod: IPSPLIT

## 2025-02-09 PROCEDURE — 36415 COLL VENOUS BLD VENIPUNCTURE: CPT | Mod: IPSPLIT | Performed by: NURSE PRACTITIONER

## 2025-02-09 PROCEDURE — 99239 HOSP IP/OBS DSCHRG MGMT >30: CPT

## 2025-02-09 PROCEDURE — 85025 COMPLETE CBC W/AUTO DIFF WBC: CPT | Mod: IPSPLIT | Performed by: NURSE PRACTITIONER

## 2025-02-09 PROCEDURE — 84145 PROCALCITONIN (PCT): CPT | Mod: GENLAB | Performed by: NURSE PRACTITIONER

## 2025-02-09 PROCEDURE — 2500000002 HC RX 250 W HCPCS SELF ADMINISTERED DRUGS (ALT 637 FOR MEDICARE OP, ALT 636 FOR OP/ED): Mod: IPSPLIT

## 2025-02-09 PROCEDURE — 2500000002 HC RX 250 W HCPCS SELF ADMINISTERED DRUGS (ALT 637 FOR MEDICARE OP, ALT 636 FOR OP/ED): Mod: IPSPLIT | Performed by: NURSE PRACTITIONER

## 2025-02-09 PROCEDURE — 2500000001 HC RX 250 WO HCPCS SELF ADMINISTERED DRUGS (ALT 637 FOR MEDICARE OP): Mod: IPSPLIT

## 2025-02-09 PROCEDURE — 2500000001 HC RX 250 WO HCPCS SELF ADMINISTERED DRUGS (ALT 637 FOR MEDICARE OP): Mod: IPSPLIT | Performed by: NURSE PRACTITIONER

## 2025-02-09 PROCEDURE — 80048 BASIC METABOLIC PNL TOTAL CA: CPT | Mod: IPSPLIT | Performed by: NURSE PRACTITIONER

## 2025-02-09 RX ORDER — BISACODYL 10 MG/1
10 SUPPOSITORY RECTAL DAILY
Start: 2025-02-09

## 2025-02-09 RX ORDER — CEFUROXIME AXETIL 500 MG/1
500 TABLET ORAL 2 TIMES DAILY
Start: 2025-02-09 | End: 2025-02-12

## 2025-02-09 RX ORDER — CEFUROXIME AXETIL 250 MG/1
500 TABLET ORAL 2 TIMES DAILY
Status: DISCONTINUED | OUTPATIENT
Start: 2025-02-09 | End: 2025-02-09 | Stop reason: HOSPADM

## 2025-02-09 RX ADMIN — METFORMIN HYDROCHLORIDE 1000 MG: 500 TABLET, FILM COATED ORAL at 08:18

## 2025-02-09 RX ADMIN — BUSPIRONE HYDROCHLORIDE 5 MG: 5 TABLET ORAL at 08:18

## 2025-02-09 RX ADMIN — POLYETHYLENE GLYCOL 3350 17 G: 17 POWDER, FOR SOLUTION ORAL at 08:17

## 2025-02-09 RX ADMIN — CEFUROXIME AXETIL 500 MG: 250 TABLET ORAL at 11:24

## 2025-02-09 RX ADMIN — DOCUSATE SODIUM 100 MG: 100 CAPSULE, LIQUID FILLED ORAL at 08:18

## 2025-02-09 RX ADMIN — TAMSULOSIN HYDROCHLORIDE 0.4 MG: 0.4 CAPSULE ORAL at 08:17

## 2025-02-09 RX ADMIN — FINASTERIDE 5 MG: 5 TABLET, FILM COATED ORAL at 08:18

## 2025-02-09 RX ADMIN — ATORVASTATIN CALCIUM 5 MG: 10 TABLET, FILM COATED ORAL at 08:17

## 2025-02-09 RX ADMIN — SENNOSIDES 8.6 MG: 8.6 TABLET, FILM COATED ORAL at 08:18

## 2025-02-09 RX ADMIN — OXCARBAZEPINE 300 MG: 150 TABLET, FILM COATED ORAL at 08:18

## 2025-02-09 RX ADMIN — LORAZEPAM 0.5 MG: 0.5 TABLET ORAL at 11:10

## 2025-02-09 RX ADMIN — CHOLECALCIFEROL TAB 125 MCG (5000 UNIT) 5000 UNITS: 125 TAB at 08:17

## 2025-02-09 RX ADMIN — GLIPIZIDE 5 MG: 5 TABLET ORAL at 08:18

## 2025-02-09 RX ADMIN — LISINOPRIL 10 MG: 10 TABLET ORAL at 08:18

## 2025-02-09 RX ADMIN — EMPAGLIFLOZIN 10 MG: 10 TABLET, FILM COATED ORAL at 08:18

## 2025-02-09 RX ADMIN — LEVOTHYROXINE SODIUM 88 MCG: 88 TABLET ORAL at 08:18

## 2025-02-09 ASSESSMENT — PAIN SCALES - PAIN ASSESSMENT IN ADVANCED DEMENTIA (PAINAD)
BREATHING: NORMAL
CONSOLABILITY: NO NEED TO CONSOLE
BODYLANGUAGE: RELAXED
FACIALEXPRESSION: SAD, FRIGHTENED, FROWN
TOTALSCORE: 1

## 2025-02-09 NOTE — CARE PLAN
The patient's goals for the shift include  up to chair for meals    The clinical goals for the shift include pt will take all medication this shift    Over the shift, the patient did make progress toward the following goals.       Problem: Fall/Injury  Goal: Verbalize understanding of personal risk factors for fall in the hospital  Outcome: Met  Goal: Verbalize understanding of risk factor reduction measures to prevent injury from fall in the home  Outcome: Met  Goal: Use assistive devices by end of the shift  Outcome: Met  Goal: Pace activities to prevent fatigue by end of the shift  Outcome: Met     Problem: Respiratory  Goal: Minimize anxiety/maximize coping throughout shift  Outcome: Met  Goal: Verbalize decreased shortness of breath this shift  Outcome: Met  Goal: Increase self care and/or family involvement in next 24 hours  Outcome: Met     Problem: Pain - Adult  Goal: Verbalizes/displays adequate comfort level or baseline comfort level  Outcome: Met     Problem: Safety - Adult  Goal: Free from fall injury  Outcome: Met     Problem: Discharge Planning  Goal: Discharge to home or other facility with appropriate resources  Outcome: Met     Problem: Chronic Conditions and Co-morbidities  Goal: Patient's chronic conditions and co-morbidity symptoms are monitored and maintained or improved  Outcome: Met     Problem: Nutrition  Goal: Nutrient intake appropriate for maintaining nutritional needs  Outcome: Progressing

## 2025-02-09 NOTE — NURSING NOTE
0900: pt up to chair and tolerating meds given in juice. PAINAD completed and no signs or symptoms of pain. Call bell in reach, chair alarm on. Pt still has no IV access, APOLLO Khoury PA-C aware.     1221: pt DAVID Varma called and notified of pt being discharged today and updated on plan of care.     1236: Repot called to Rocio PRYOR at [facility].     1345: pt being discharged at this time to [facility] via [facility].

## 2025-02-09 NOTE — CARE PLAN
The patient's goals for the shift include KOREY.    The clinical goals for the shift include Patient to remain free of falls overnight    Over the shift, the patient did not make progress toward the following goals. Patient nonverbal, no s/s of pain in assessment. Patient incontinent of bowel and bladder. Patient refusing oral intake and medications at HS. Provider notified. BG 98 at HS, lantus held. Patient remained in bed repositioning self frequently overnight, poor sleep obtained. Patient remains on room air, free of SOB and free of respiratory distress.   Problem: Fall/Injury  Goal: Verbalize understanding of personal risk factors for fall in the hospital  Outcome: Not Progressing  Goal: Verbalize understanding of risk factor reduction measures to prevent injury from fall in the home  Outcome: Not Progressing  Goal: Use assistive devices by end of the shift  Outcome: Not Progressing  Goal: Pace activities to prevent fatigue by end of the shift  Outcome: Not Progressing     Problem: Respiratory  Goal: Minimize anxiety/maximize coping throughout shift  Outcome: Not Progressing  Goal: Verbalize decreased shortness of breath this shift  Outcome: Not Progressing  Goal: Increase self care and/or family involvement in next 24 hours  Outcome: Not Progressing     Problem: Discharge Planning  Goal: Discharge to home or other facility with appropriate resources  Outcome: Not Progressing     Problem: Chronic Conditions and Co-morbidities  Goal: Patient's chronic conditions and co-morbidity symptoms are monitored and maintained or improved  Outcome: Not Progressing     Problem: Nutrition  Goal: Nutrient intake appropriate for maintaining nutritional needs  Outcome: Not Progressing     Problem: Diabetes  Goal: Learn about and adhere to nutrition recommendations by end of shift  Outcome: Not Progressing  Goal: Increase self care and/or family involovement by end of shift  Outcome: Not Progressing  Goal: Receive DSME education  by end of shift  Outcome: Not Progressing     Problem: Skin  Goal: Prevent/manage excess moisture  Outcome: Not Progressing

## 2025-02-09 NOTE — DISCHARGE SUMMARY
Discharge Diagnosis  Pneumonia, unspecified organism    Issues Requiring Follow-Up    Follow up with your PCP at [facility]     Discharge Meds     Medication List      START taking these medications     cefuroxime 500 mg tablet; Commonly known as: Ceftin; Take 1 tablet (500   mg) by mouth 2 times a day for 3 days.     CHANGE how you take these medications     bisacodyl 10 mg suppository; Commonly known as: Dulcolax; Insert 1   suppository (10 mg) into the rectum once daily.; What changed: when to   take this, reasons to take this     CONTINUE taking these medications     acetaminophen 325 mg tablet; Commonly known as: Tylenol   alum-mag hydroxide-simeth 200-200-20 mg/5 mL oral suspension; Commonly   known as: Mylanta   atorvastatin 10 mg tablet; Commonly known as: Lipitor   busPIRone 5 mg tablet; Commonly known as: Buspar   cholecalciferol 5,000 Units tablet; Commonly known as: Vitamin D-3   docusate sodium 100 mg tablet; Commonly known as: Colace   empagliflozin 10 mg; Commonly known as: Jardiance   finasteride 5 mg tablet; Commonly known as: Proscar   glipiZIDE 5 mg tablet; Commonly known as: Glucotrol   glucagon 1 mg/0.2 mL syringe   Lantus U-100 Insulin 100 unit/mL injection; Generic drug: insulin   glargine   levothyroxine 88 mcg tablet; Commonly known as: Synthroid, Levoxyl   lisinopril 10 mg tablet   LORazepam 0.5 mg tablet; Commonly known as: Ativan   magnesium hydroxide 400 mg/5 mL suspension; Commonly known as: Milk of   Magnesia   metFORMIN 1,000 mg tablet; Commonly known as: Glucophage   mineral oil enema   OXcarbazepine 300 mg tablet; Commonly known as: Trileptal   risperiDONE 120 mg suspension,extended rel syring subcutaneous   injection; Commonly known as: Perseris   sennosides 8.6 mg tablet; Commonly known as: Senokot   tamsulosin 0.4 mg 24 hr capsule; Commonly known as: Flomax   Tradjenta 5 mg tablet; Generic drug: linaGLIPtin       Test Results Pending At Discharge  Pending Labs       Order  Current Status    Extra Urine Gray Tube Collected (02/05/25 0844)    Procalcitonin In process    Urinalysis with Reflex Culture and Microscopic In process    Blood Culture Preliminary result    Blood Culture Preliminary result          History Of Present Illness  Clive Woody is a 89 y.o. male presenting with vomiting. Patient is nonverbal at baseline and resides at [facility].  Per ED note, patient was brought to the ED last night after he had an episode of emesis during dinner. Patient had another bout of emesis en route to the ED per EMS. Patient required up to 3L O2 via NC in the ED; he is not on home O2. He was febrile with TMax 39.1C. He was also tachycardic up to the 110s; BP remained stable in the ED. Labs were significant for WBC 17.2, H/H 11.7/38.6. Flu, COVID negative. UA was indicative of infection with 250 leuk esterase, 6-10 RBCs, 21-50 WBCs, and budding yeast. CT head was negative for acute infarct/hemorrhage. CT chest showed clustered ground glass opacities in the bilateral lower lobes suspicious for infectious bronchiolitis with debris-filled esophagus. CT abdomen/pelvis showed a moderate to large amount of formed stool throughout the colon and prostatomegaly. Patient was given Tylenol suppository for fever and Zofran for nausea. He received Vancomycin and Zosyn in the ED for pneumonia coverage and was admitted to med/surg. SLP has been consulted to rule out aspiration.     Hospital Course   Patient received azithromycin and ceftriaxone for community acquired pneumonia coverage. He was seen by SLP; diet recommendations placed.  He was able to be weaned to room air prior to discharge. He received meds for constipation and had multiple bowel movements while admitted. UCx resulted with candida. Patient's did not eat much of his meals while admitted but he did drink adequate fluids. He remains stable on room air. He is discharged on cefuroxime to complete a total of 7 days and will follow  up with his PCP at [facility].     Disposition: Patient stable for discharge to [facility].     Pertinent Physical Exam At Time of Discharge  Physical Exam  Constitutional:       General: He is not in acute distress.     Appearance: He is not ill-appearing.   HENT:      Head: Normocephalic and atraumatic.      Mouth/Throat:      Mouth: Mucous membranes are dry.   Eyes:      Conjunctiva/sclera: Conjunctivae normal.   Cardiovascular:      Rate and Rhythm: Normal rate and regular rhythm.   Pulmonary:      Effort: No respiratory distress.      Comments: Diminished breath sounds, on room air   Abdominal:      General: There is no distension.      Palpations: Abdomen is soft.      Tenderness: There is no abdominal tenderness.   Musculoskeletal:      Right lower leg: No edema.      Left lower leg: No edema.   Skin:     General: Skin is warm and dry.   Neurological:      Mental Status: He is alert. Mental status is at baseline.      Comments: Nonverbal          Outpatient Follow-Up  No future appointments.      Zainab Khoury PA-C

## 2025-02-10 ENCOUNTER — NURSING HOME VISIT (OUTPATIENT)
Dept: POST ACUTE CARE | Facility: EXTERNAL LOCATION | Age: OVER 89
End: 2025-02-10
Payer: COMMERCIAL

## 2025-02-10 DIAGNOSIS — F03.90 DEMENTIA, UNSPECIFIED DEMENTIA SEVERITY, UNSPECIFIED DEMENTIA TYPE, UNSPECIFIED WHETHER BEHAVIORAL, PSYCHOTIC, OR MOOD DISTURBANCE OR ANXIETY (MULTI): ICD-10-CM

## 2025-02-10 DIAGNOSIS — I10 BENIGN HYPERTENSION: ICD-10-CM

## 2025-02-10 DIAGNOSIS — Z91.81 AT RISK FOR FALLS: ICD-10-CM

## 2025-02-10 DIAGNOSIS — Z79.4 TYPE 2 DIABETES MELLITUS WITHOUT COMPLICATION, WITH LONG-TERM CURRENT USE OF INSULIN (MULTI): ICD-10-CM

## 2025-02-10 DIAGNOSIS — E11.9 TYPE 2 DIABETES MELLITUS WITHOUT COMPLICATION, WITH LONG-TERM CURRENT USE OF INSULIN (MULTI): ICD-10-CM

## 2025-02-10 DIAGNOSIS — N40.0 BENIGN PROSTATIC HYPERPLASIA, UNSPECIFIED WHETHER LOWER URINARY TRACT SYMPTOMS PRESENT: ICD-10-CM

## 2025-02-10 DIAGNOSIS — E03.9 HYPOTHYROIDISM, UNSPECIFIED TYPE: ICD-10-CM

## 2025-02-10 DIAGNOSIS — J18.9 PNEUMONIA DUE TO INFECTIOUS ORGANISM, UNSPECIFIED LATERALITY, UNSPECIFIED PART OF LUNG: Primary | ICD-10-CM

## 2025-02-10 DIAGNOSIS — I25.10 ASHD (ARTERIOSCLEROTIC HEART DISEASE): ICD-10-CM

## 2025-02-10 DIAGNOSIS — F20.9 SCHIZOPHRENIA, UNSPECIFIED TYPE: ICD-10-CM

## 2025-02-10 DIAGNOSIS — R53.1 WEAKNESS: ICD-10-CM

## 2025-02-10 DIAGNOSIS — G40.909 SEIZURE DISORDER (MULTI): ICD-10-CM

## 2025-02-10 LAB — PROCALCITONIN SERPL-MCNC: 2.06 NG/ML

## 2025-02-10 PROCEDURE — 99309 SBSQ NF CARE MODERATE MDM 30: CPT | Performed by: INTERNAL MEDICINE

## 2025-02-10 NOTE — LETTER
Patient: Clive Woody  : 1934    Encounter Date: 02/10/2025    PLACE OF SERVICE:  [facility].    This is a subsequent visit.    Subjective  Patient ID: Clive Woody is a 89 y.o. male who presents for Follow-up.    Mr. Clive Woody is a 89-year-old male with history of dementia and schizophrenia.  He has recently developed pneumonia and continues on antibiotics.  He requires supportive care.    Review of Systems   Constitutional:  Negative for chills and fever.   Cardiovascular:  Negative for chest pain.   All other systems reviewed and are negative.    Objective  /78   Pulse 84   Temp 36.8 °C (98.3 °F)   Resp 18     Physical Exam  Vitals reviewed.   Constitutional:       General: He is not in acute distress.     Comments: This is a well-developed, well-nourished male, sitting in a chair.   HENT:      Right Ear: Tympanic membrane, ear canal and external ear normal.      Left Ear: Tympanic membrane, ear canal and external ear normal.   Eyes:      General: No scleral icterus.     Pupils: Pupils are equal, round, and reactive to light.   Neck:      Vascular: No carotid bruit.   Cardiovascular:      Heart sounds: Normal heart sounds, S1 normal and S2 normal. No murmur heard.     No friction rub.   Pulmonary:      Effort: Pulmonary effort is normal.      Breath sounds: Normal breath sounds and air entry.   Abdominal:      Palpations: There is no hepatomegaly, splenomegaly or mass.   Musculoskeletal:         General: No swelling or deformity. Normal range of motion.      Cervical back: Neck supple.      Right lower leg: No edema.      Left lower leg: No edema.   Lymphadenopathy:      Cervical: No cervical adenopathy.      Upper Body:      Right upper body: No axillary adenopathy.      Left upper body: No axillary adenopathy.      Lower Body: No right inguinal adenopathy. No left inguinal adenopathy.   Neurological:      Mental Status: He is alert.      Cranial Nerves: Cranial nerves  2-12 are intact. No cranial nerve deficit.      Sensory: No sensory deficit.      Motor: Motor function is intact. No weakness.      Gait: Gait is intact.      Deep Tendon Reflexes: Reflexes normal.      Comments: The patient is oriented x2.   Psychiatric:         Mood and Affect: Mood normal. Mood is not anxious or depressed. Affect is not angry.         Behavior: Behavior is not agitated.         Thought Content: Thought content normal.         Judgment: Judgment normal.     LAB WORK: Laboratory studies reviewed.    Assessment/Plan  Problem List Items Addressed This Visit             ICD-10-CM       Advance Directives and General Issues    At risk for falls Z91.81       Cardiac and Vasculature    Benign hypertension I10    ASHD (arteriosclerotic heart disease) I25.10       Endocrine/Metabolic    Diabetes mellitus (Multi) E11.9    Hypothyroidism E03.9       Neuro    Dementia F03.90       Pulmonary and Pneumonias    Pneumonia, unspecified organism - Primary J18.9     Other Visit Diagnoses         Codes    Schizophrenia, unspecified type     F20.9    Seizure disorder (Multi)     G40.909    Benign prostatic hyperplasia, unspecified whether lower urinary tract symptoms present     N40.0    Weakness     R53.1        1. Pneumonia, on antibiotic.  2. Schizophrenia, on medication.  3. Diabetes, on insulin.  4. Dementia, unchanged.  5. Seizure disorder, on antiepileptic.  6. Hypertension, med controlled.  7. ASHD, on aspirin.  8. BPH, on tamsulosin.  9. Hypothyroidism, on levothyroxine.  10. Weakness, on PT/OT.  11. Fall risk, on fall precaution.    Scribe Attestation  By signing my name below, I, Joyce Junior attest that this documentation has been prepared under the direction and in the presence of Sergio Islas MD.     All medical record entries made by the scribe were personally dictated by me I have reviewed the chart and agree the record accurately reflects my personal performance of his history physical  examination and management      Electronically Signed By: Sergio Islas MD   2/16/25 11:19 PM

## 2025-02-13 VITALS
RESPIRATION RATE: 18 BRPM | TEMPERATURE: 98.3 F | HEART RATE: 84 BPM | DIASTOLIC BLOOD PRESSURE: 78 MMHG | SYSTOLIC BLOOD PRESSURE: 124 MMHG

## 2025-02-13 ASSESSMENT — ENCOUNTER SYMPTOMS
CHILLS: 0
FEVER: 0

## 2025-02-13 NOTE — PROGRESS NOTES
PLACE OF SERVICE:  [facility].    This is a subsequent visit.    Subjective   Patient ID: Clive Woody is a 89 y.o. male who presents for Follow-up.    Mr. Clive Woody is a 89-year-old male with history of dementia and schizophrenia.  He has recently developed pneumonia and continues on antibiotics.  He requires supportive care.    Review of Systems   Constitutional:  Negative for chills and fever.   Cardiovascular:  Negative for chest pain.   All other systems reviewed and are negative.    Objective   /78   Pulse 84   Temp 36.8 °C (98.3 °F)   Resp 18     Physical Exam  Vitals reviewed.   Constitutional:       General: He is not in acute distress.     Comments: This is a well-developed, well-nourished male, sitting in a chair.   HENT:      Right Ear: Tympanic membrane, ear canal and external ear normal.      Left Ear: Tympanic membrane, ear canal and external ear normal.   Eyes:      General: No scleral icterus.     Pupils: Pupils are equal, round, and reactive to light.   Neck:      Vascular: No carotid bruit.   Cardiovascular:      Heart sounds: Normal heart sounds, S1 normal and S2 normal. No murmur heard.     No friction rub.   Pulmonary:      Effort: Pulmonary effort is normal.      Breath sounds: Normal breath sounds and air entry.   Abdominal:      Palpations: There is no hepatomegaly, splenomegaly or mass.   Musculoskeletal:         General: No swelling or deformity. Normal range of motion.      Cervical back: Neck supple.      Right lower leg: No edema.      Left lower leg: No edema.   Lymphadenopathy:      Cervical: No cervical adenopathy.      Upper Body:      Right upper body: No axillary adenopathy.      Left upper body: No axillary adenopathy.      Lower Body: No right inguinal adenopathy. No left inguinal adenopathy.   Neurological:      Mental Status: He is alert.      Cranial Nerves: Cranial nerves 2-12 are intact. No cranial nerve deficit.      Sensory: No sensory  deficit.      Motor: Motor function is intact. No weakness.      Gait: Gait is intact.      Deep Tendon Reflexes: Reflexes normal.      Comments: The patient is oriented x2.   Psychiatric:         Mood and Affect: Mood normal. Mood is not anxious or depressed. Affect is not angry.         Behavior: Behavior is not agitated.         Thought Content: Thought content normal.         Judgment: Judgment normal.     LAB WORK: Laboratory studies reviewed.    Assessment/Plan   Problem List Items Addressed This Visit             ICD-10-CM       Advance Directives and General Issues    At risk for falls Z91.81       Cardiac and Vasculature    Benign hypertension I10    ASHD (arteriosclerotic heart disease) I25.10       Endocrine/Metabolic    Diabetes mellitus (Multi) E11.9    Hypothyroidism E03.9       Neuro    Dementia F03.90       Pulmonary and Pneumonias    Pneumonia, unspecified organism - Primary J18.9     Other Visit Diagnoses         Codes    Schizophrenia, unspecified type     F20.9    Seizure disorder (Multi)     G40.909    Benign prostatic hyperplasia, unspecified whether lower urinary tract symptoms present     N40.0    Weakness     R53.1        1. Pneumonia, on antibiotic.  2. Schizophrenia, on medication.  3. Diabetes, on insulin.  4. Dementia, unchanged.  5. Seizure disorder, on antiepileptic.  6. Hypertension, med controlled.  7. ASHD, on aspirin.  8. BPH, on tamsulosin.  9. Hypothyroidism, on levothyroxine.  10. Weakness, on PT/OT.  11. Fall risk, on fall precaution.    Scribe Attestation  By signing my name below, ISofia Scribe attest that this documentation has been prepared under the direction and in the presence of Sergio Islas MD.     All medical record entries made by the scribe were personally dictated by me I have reviewed the chart and agree the record accurately reflects my personal performance of his history physical examination and management

## 2025-02-16 ENCOUNTER — NURSING HOME VISIT (OUTPATIENT)
Dept: POST ACUTE CARE | Facility: EXTERNAL LOCATION | Age: OVER 89
End: 2025-02-16
Payer: COMMERCIAL

## 2025-02-16 DIAGNOSIS — E03.9 HYPOTHYROIDISM, UNSPECIFIED TYPE: ICD-10-CM

## 2025-02-16 DIAGNOSIS — I25.10 ASHD (ARTERIOSCLEROTIC HEART DISEASE): ICD-10-CM

## 2025-02-16 DIAGNOSIS — E11.9 TYPE 2 DIABETES MELLITUS WITHOUT COMPLICATION, WITH LONG-TERM CURRENT USE OF INSULIN (MULTI): ICD-10-CM

## 2025-02-16 DIAGNOSIS — I10 BENIGN HYPERTENSION: ICD-10-CM

## 2025-02-16 DIAGNOSIS — G40.909 SEIZURE DISORDER (MULTI): ICD-10-CM

## 2025-02-16 DIAGNOSIS — J18.9 PNEUMONIA DUE TO INFECTIOUS ORGANISM, UNSPECIFIED LATERALITY, UNSPECIFIED PART OF LUNG: Primary | ICD-10-CM

## 2025-02-16 DIAGNOSIS — Z91.81 AT RISK FOR FALLS: ICD-10-CM

## 2025-02-16 DIAGNOSIS — F03.90 DEMENTIA, UNSPECIFIED DEMENTIA SEVERITY, UNSPECIFIED DEMENTIA TYPE, UNSPECIFIED WHETHER BEHAVIORAL, PSYCHOTIC, OR MOOD DISTURBANCE OR ANXIETY (MULTI): ICD-10-CM

## 2025-02-16 DIAGNOSIS — R53.1 WEAKNESS: ICD-10-CM

## 2025-02-16 DIAGNOSIS — N40.0 BENIGN PROSTATIC HYPERPLASIA, UNSPECIFIED WHETHER LOWER URINARY TRACT SYMPTOMS PRESENT: ICD-10-CM

## 2025-02-16 DIAGNOSIS — Z79.4 TYPE 2 DIABETES MELLITUS WITHOUT COMPLICATION, WITH LONG-TERM CURRENT USE OF INSULIN (MULTI): ICD-10-CM

## 2025-02-16 DIAGNOSIS — F20.9 SCHIZOPHRENIA, UNSPECIFIED TYPE: ICD-10-CM

## 2025-02-16 PROCEDURE — 99309 SBSQ NF CARE MODERATE MDM 30: CPT | Performed by: INTERNAL MEDICINE

## 2025-02-16 NOTE — LETTER
Patient: Clive Woody  : 1934    Encounter Date: 2025    PLACE OF SERVICE:  [facility]    This is a subsequent visit.    Subjective  Patient ID: Clive Woody is a 89 y.o. male who presents for Follow-up.    Mr. Clive Woody is a 89-year-old male with history of schizophrenia with dementia.  He has had a recent pneumonia and is finishing antibiotic.  He requires supportive care.    Review of Systems   Constitutional:  Negative for chills and fever.   Cardiovascular:  Negative for chest pain.   All other systems reviewed and are negative.    Objective  /78   Pulse 78   Temp 36.7 °C (98.1 °F)   Resp 18     Physical Exam  Vitals reviewed.   Constitutional:       General: He is not in acute distress.     Comments: This is a well-developed, well-nourished male, sitting in a chair   HENT:      Right Ear: Tympanic membrane, ear canal and external ear normal.      Left Ear: Tympanic membrane, ear canal and external ear normal.   Eyes:      General: No scleral icterus.     Pupils: Pupils are equal, round, and reactive to light.   Neck:      Vascular: No carotid bruit.   Cardiovascular:      Heart sounds: Normal heart sounds, S1 normal and S2 normal. No murmur heard.     No friction rub.   Pulmonary:      Effort: Pulmonary effort is normal.      Breath sounds: Normal breath sounds and air entry.   Abdominal:      Palpations: There is no hepatomegaly, splenomegaly or mass.   Musculoskeletal:         General: No swelling or deformity. Normal range of motion.      Cervical back: Neck supple.      Right lower leg: No edema.      Left lower leg: No edema.   Lymphadenopathy:      Cervical: No cervical adenopathy.      Upper Body:      Right upper body: No axillary adenopathy.      Left upper body: No axillary adenopathy.      Lower Body: No right inguinal adenopathy. No left inguinal adenopathy.   Neurological:      Cranial Nerves: Cranial nerves 2-12 are intact. No cranial nerve deficit.       Sensory: No sensory deficit.      Motor: Motor function is intact. No weakness.      Gait: Gait is intact.      Deep Tendon Reflexes: Reflexes normal.      Comments: The patient is alert and oriented x2.   Psychiatric:         Mood and Affect: Mood normal. Mood is not anxious or depressed. Affect is not angry.         Behavior: Behavior is not agitated.         Thought Content: Thought content normal.         Judgment: Judgment normal.     LAB WORK: Laboratory studies reviewed.    Assessment/Plan  Problem List Items Addressed This Visit             ICD-10-CM       Advance Directives and General Issues    At risk for falls Z91.81       Cardiac and Vasculature    Benign hypertension I10    ASHD (arteriosclerotic heart disease) I25.10       Endocrine/Metabolic    Diabetes mellitus (Multi) E11.9    Hypothyroidism E03.9       Neuro    Dementia F03.90       Pulmonary and Pneumonias    Pneumonia, unspecified organism - Primary J18.9     Other Visit Diagnoses         Codes    Seizure disorder (Multi)     G40.909    Schizophrenia, unspecified type     F20.9    Benign prostatic hyperplasia, unspecified whether lower urinary tract symptoms present     N40.0    Weakness     R53.1        1. Pneumonia, is finishing antibiotic.  2. Diabetes, on insulin.  3. Seizure disorder, on antiepileptic.  4. Schizophrenia, on medication.  5. Dementia, unchanged.  6. BPH, on tamsulosin.  7. Hypothyroidism, on levothyroxine.  8. Hypertension, med controlled.  9. ASHD, on aspirin.  10. Weakness, on PT/OT.  11. Fall risk, on fall precaution.    Scribe Attestation  By signing my name below, IChastity Scribe attest that this documentation has been prepared under the direction and in the presence of Sergio Islas MD.     All medical record entries made by the scribe were personally dictated by me I have reviewed the chart and agree the record accurately reflects my personal performance of his history physical examination and  management      Electronically Signed By: Sergio Islas MD   2/23/25 12:35 AM

## 2025-02-19 VITALS
SYSTOLIC BLOOD PRESSURE: 128 MMHG | TEMPERATURE: 98.1 F | HEART RATE: 78 BPM | DIASTOLIC BLOOD PRESSURE: 78 MMHG | RESPIRATION RATE: 18 BRPM

## 2025-02-19 ASSESSMENT — ENCOUNTER SYMPTOMS
CHILLS: 0
FEVER: 0

## 2025-02-20 ENCOUNTER — NURSING HOME VISIT (OUTPATIENT)
Dept: POST ACUTE CARE | Facility: EXTERNAL LOCATION | Age: OVER 89
End: 2025-02-20
Payer: COMMERCIAL

## 2025-02-20 DIAGNOSIS — N40.0 BENIGN PROSTATIC HYPERPLASIA, UNSPECIFIED WHETHER LOWER URINARY TRACT SYMPTOMS PRESENT: ICD-10-CM

## 2025-02-20 DIAGNOSIS — E11.9 TYPE 2 DIABETES MELLITUS WITHOUT COMPLICATION, WITH LONG-TERM CURRENT USE OF INSULIN (MULTI): ICD-10-CM

## 2025-02-20 DIAGNOSIS — I25.10 ASHD (ARTERIOSCLEROTIC HEART DISEASE): ICD-10-CM

## 2025-02-20 DIAGNOSIS — J18.9 PNEUMONIA DUE TO INFECTIOUS ORGANISM, UNSPECIFIED LATERALITY, UNSPECIFIED PART OF LUNG: ICD-10-CM

## 2025-02-20 DIAGNOSIS — E56.9 VITAMIN DEFICIENCY: ICD-10-CM

## 2025-02-20 DIAGNOSIS — Z79.4 TYPE 2 DIABETES MELLITUS WITHOUT COMPLICATION, WITH LONG-TERM CURRENT USE OF INSULIN (MULTI): ICD-10-CM

## 2025-02-20 DIAGNOSIS — E03.9 HYPOTHYROIDISM, UNSPECIFIED TYPE: ICD-10-CM

## 2025-02-20 DIAGNOSIS — F03.90 DEMENTIA, UNSPECIFIED DEMENTIA SEVERITY, UNSPECIFIED DEMENTIA TYPE, UNSPECIFIED WHETHER BEHAVIORAL, PSYCHOTIC, OR MOOD DISTURBANCE OR ANXIETY (MULTI): ICD-10-CM

## 2025-02-20 DIAGNOSIS — M62.81 MUSCLE WEAKNESS (GENERALIZED): Primary | ICD-10-CM

## 2025-02-20 DIAGNOSIS — I10 BENIGN HYPERTENSION: ICD-10-CM

## 2025-02-20 PROCEDURE — 99310 SBSQ NF CARE HIGH MDM 45: CPT | Performed by: NURSE PRACTITIONER

## 2025-02-20 NOTE — PROGRESS NOTES
PLACE OF SERVICE:  [facility]    This is a subsequent visit.    Subjective   Patient ID: Clive Woody is a 89 y.o. male who presents for Follow-up.    Mr. Clive Woody is a 89-year-old male with history of schizophrenia with dementia.  He has had a recent pneumonia and is finishing antibiotic.  He requires supportive care.    Review of Systems   Constitutional:  Negative for chills and fever.   Cardiovascular:  Negative for chest pain.   All other systems reviewed and are negative.    Objective   /78   Pulse 78   Temp 36.7 °C (98.1 °F)   Resp 18     Physical Exam  Vitals reviewed.   Constitutional:       General: He is not in acute distress.     Comments: This is a well-developed, well-nourished male, sitting in a chair   HENT:      Right Ear: Tympanic membrane, ear canal and external ear normal.      Left Ear: Tympanic membrane, ear canal and external ear normal.   Eyes:      General: No scleral icterus.     Pupils: Pupils are equal, round, and reactive to light.   Neck:      Vascular: No carotid bruit.   Cardiovascular:      Heart sounds: Normal heart sounds, S1 normal and S2 normal. No murmur heard.     No friction rub.   Pulmonary:      Effort: Pulmonary effort is normal.      Breath sounds: Normal breath sounds and air entry.   Abdominal:      Palpations: There is no hepatomegaly, splenomegaly or mass.   Musculoskeletal:         General: No swelling or deformity. Normal range of motion.      Cervical back: Neck supple.      Right lower leg: No edema.      Left lower leg: No edema.   Lymphadenopathy:      Cervical: No cervical adenopathy.      Upper Body:      Right upper body: No axillary adenopathy.      Left upper body: No axillary adenopathy.      Lower Body: No right inguinal adenopathy. No left inguinal adenopathy.   Neurological:      Cranial Nerves: Cranial nerves 2-12 are intact. No cranial nerve deficit.      Sensory: No sensory deficit.      Motor: Motor function is intact.  No weakness.      Gait: Gait is intact.      Deep Tendon Reflexes: Reflexes normal.      Comments: The patient is alert and oriented x2.   Psychiatric:         Mood and Affect: Mood normal. Mood is not anxious or depressed. Affect is not angry.         Behavior: Behavior is not agitated.         Thought Content: Thought content normal.         Judgment: Judgment normal.     LAB WORK: Laboratory studies reviewed.    Assessment/Plan   Problem List Items Addressed This Visit             ICD-10-CM       Advance Directives and General Issues    At risk for falls Z91.81       Cardiac and Vasculature    Benign hypertension I10    ASHD (arteriosclerotic heart disease) I25.10       Endocrine/Metabolic    Diabetes mellitus (Multi) E11.9    Hypothyroidism E03.9       Neuro    Dementia F03.90       Pulmonary and Pneumonias    Pneumonia, unspecified organism - Primary J18.9     Other Visit Diagnoses         Codes    Seizure disorder (Multi)     G40.909    Schizophrenia, unspecified type     F20.9    Benign prostatic hyperplasia, unspecified whether lower urinary tract symptoms present     N40.0    Weakness     R53.1        1. Pneumonia, is finishing antibiotic.  2. Diabetes, on insulin.  3. Seizure disorder, on antiepileptic.  4. Schizophrenia, on medication.  5. Dementia, unchanged.  6. BPH, on tamsulosin.  7. Hypothyroidism, on levothyroxine.  8. Hypertension, med controlled.  9. ASHD, on aspirin.  10. Weakness, on PT/OT.  11. Fall risk, on fall precaution.    Scribe Attestation  By signing my name below, Chastity JOSE Scribe attest that this documentation has been prepared under the direction and in the presence of Sergio Islas MD.     All medical record entries made by the scribe were personally dictated by me I have reviewed the chart and agree the record accurately reflects my personal performance of his history physical examination and management

## 2025-02-23 ENCOUNTER — NURSING HOME VISIT (OUTPATIENT)
Dept: POST ACUTE CARE | Facility: EXTERNAL LOCATION | Age: OVER 89
End: 2025-02-23
Payer: COMMERCIAL

## 2025-02-23 DIAGNOSIS — E03.9 HYPOTHYROIDISM, UNSPECIFIED TYPE: ICD-10-CM

## 2025-02-23 DIAGNOSIS — N40.0 BENIGN PROSTATIC HYPERPLASIA, UNSPECIFIED WHETHER LOWER URINARY TRACT SYMPTOMS PRESENT: ICD-10-CM

## 2025-02-23 DIAGNOSIS — R53.1 WEAKNESS: ICD-10-CM

## 2025-02-23 DIAGNOSIS — F20.9 SCHIZOPHRENIA, UNSPECIFIED TYPE: ICD-10-CM

## 2025-02-23 DIAGNOSIS — G40.909 SEIZURE DISORDER (MULTI): ICD-10-CM

## 2025-02-23 DIAGNOSIS — Z79.4 TYPE 2 DIABETES MELLITUS WITHOUT COMPLICATION, WITH LONG-TERM CURRENT USE OF INSULIN (MULTI): ICD-10-CM

## 2025-02-23 DIAGNOSIS — Z91.81 AT RISK FOR FALLS: ICD-10-CM

## 2025-02-23 DIAGNOSIS — J18.9 PNEUMONIA DUE TO INFECTIOUS ORGANISM, UNSPECIFIED LATERALITY, UNSPECIFIED PART OF LUNG: ICD-10-CM

## 2025-02-23 DIAGNOSIS — I10 BENIGN HYPERTENSION: ICD-10-CM

## 2025-02-23 DIAGNOSIS — E11.9 TYPE 2 DIABETES MELLITUS WITHOUT COMPLICATION, WITH LONG-TERM CURRENT USE OF INSULIN (MULTI): ICD-10-CM

## 2025-02-23 DIAGNOSIS — F03.90 DEMENTIA, UNSPECIFIED DEMENTIA SEVERITY, UNSPECIFIED DEMENTIA TYPE, UNSPECIFIED WHETHER BEHAVIORAL, PSYCHOTIC, OR MOOD DISTURBANCE OR ANXIETY (MULTI): Primary | ICD-10-CM

## 2025-02-23 DIAGNOSIS — I25.10 ASHD (ARTERIOSCLEROTIC HEART DISEASE): ICD-10-CM

## 2025-02-23 PROCEDURE — 99309 SBSQ NF CARE MODERATE MDM 30: CPT | Performed by: INTERNAL MEDICINE

## 2025-02-23 NOTE — LETTER
Patient: Clive Woody  : 1934    Encounter Date: 2025    PLACE OF SERVICE:  [facility]    This is a subsequent visit.    Subjective  Patient ID: Clive Woody is a 89 y.o. male who presents for Follow-up.    Mr. Clive Woody is a 89-year-old male with history of dementia and diabetes with recent pneumonia.  He suffers from weakness and deconditioning. He is unable to take care for himself.  He requires supportive care.    Review of Systems   Constitutional:  Negative for chills and fever.   Cardiovascular:  Negative for chest pain.   All other systems reviewed and are negative.    Objective  /80   Pulse 80   Temp 36.6 °C (97.8 °F)   Resp 16     Physical Exam  Vitals reviewed.   Constitutional:       General: He is not in acute distress.     Comments: This is a well-developed, well-nourished male, sitting in chair.   HENT:      Right Ear: Tympanic membrane, ear canal and external ear normal.      Left Ear: Tympanic membrane, ear canal and external ear normal.   Eyes:      General: No scleral icterus.     Pupils: Pupils are equal, round, and reactive to light.   Neck:      Vascular: No carotid bruit.   Cardiovascular:      Heart sounds: Normal heart sounds, S1 normal and S2 normal. No murmur heard.     No friction rub.   Pulmonary:      Effort: Pulmonary effort is normal.      Breath sounds: Normal breath sounds and air entry.   Abdominal:      Palpations: There is no hepatomegaly, splenomegaly or mass.   Musculoskeletal:         General: No swelling or deformity. Normal range of motion.      Cervical back: Neck supple.      Right lower leg: No edema.      Left lower leg: No edema.   Lymphadenopathy:      Cervical: No cervical adenopathy.      Upper Body:      Right upper body: No axillary adenopathy.      Left upper body: No axillary adenopathy.      Lower Body: No right inguinal adenopathy. No left inguinal adenopathy.   Neurological:      Mental Status: He is alert.       Cranial Nerves: Cranial nerves 2-12 are intact. No cranial nerve deficit.      Sensory: No sensory deficit.      Motor: Motor function is intact. No weakness.      Gait: Gait is intact.      Deep Tendon Reflexes: Reflexes normal.      Comments: The patient is oriented x2.   Psychiatric:         Mood and Affect: Mood normal. Mood is not anxious or depressed. Affect is not angry.         Behavior: Behavior is not agitated.         Thought Content: Thought content normal.         Judgment: Judgment normal.     LAB WORK:  Laboratory studies were reviewed.    Assessment/Plan  Problem List Items Addressed This Visit             ICD-10-CM    Dementia - Primary F03.90    Benign hypertension I10    ASHD (arteriosclerotic heart disease) I25.10    Diabetes mellitus (Multi) E11.9    At risk for falls Z91.81    Pneumonia, unspecified organism J18.9    Hypothyroidism E03.9     Other Visit Diagnoses         Codes    Schizophrenia, unspecified type     F20.9    Seizure disorder (Multi)     G40.909    Benign prostatic hyperplasia, unspecified whether lower urinary tract symptoms present     N40.0    Weakness     R53.1        1. Dementia, unchanged.  2. Schizophrenia, on medication.  3. Seizure disorder, on antiepileptic.  4. Diabetes, on insulin.  5. Hypertension, medically controlled.  6. Hypothyroidism, on levothyroxine.  7. BPH, on tamsulosin.  8. ASHD, on aspirin.  9. Recent pneumonia.  Continue to monitor.  10. Weakness, on PT/OT.  11. Fall risk, on fall precautions.    Scribe Attestation  By signing my name below, IChastity Scribe attest that this documentation has been prepared under the direction and in the presence of Sergio Islas MD.     All medical record entries made by the scribe were personally dictated by me I have reviewed the chart and agree the record accurately reflects my personal performance of his history physical examination and management      Electronically Signed By: Sergio Islas MD   2/28/25  10:00 PM

## 2025-02-24 NOTE — PROGRESS NOTES
*Provider Impression*  Patient is a 89 year y/o male who is seen today for management of multiple medical problems  #Weakness - PT/OT  #HCAP - completed antibiotics  #T2DM - lantus 10units QHS, glipizide 5mg daily; tradjenta 5mg daily, metformin 1000mg BID, empagliflozin 10mg daily  #BPH - finasteride 5mg daily, tamsulosin 0.4mg QPM  #HTN / CAD - atorvastatin 5mg daily, lisinopril 10mg daily  #Vitamin D deficiency - vitamin D 2000units daily  #Hypothyroidism - levothyroxine 88mcg daily  #Dementia - mgmt per psych - risperidone, trileptal, diazepam, lorazepam, buspar  #ACP - Full Code  Follow up as needed      *Chief Complaint*  multiple medical problems     *History of Present Illness*  Pt is an 89 y/o male LTC resident of [facility] w/ [facility] as below who is seen today for follow up and management of multiple medical problems.    He was sent to the ED with vomiting. He required up to 3L O2 via NC in the ED, not on home O2. He was febrile with Tmax 39.1C. He was also tachycardic up to the 110s; BP remained stable in the ED. Labs were significant for WBC 17.2, H/H 11.7/38.6. Flu, COVID negative. UA was indicative of infection with 250 leuk esterase, 6-10 RBCs, 21-50 WBCs, and budding yeast. CT head was negative for acute infarct/hemorrhage. CT chest showed clustered ground glass opacities in the bilateral lower lobes suspicious for infectious bronchiolitis with debris-filled esophagus. CT abdomen/pelvis showed a moderate to large amount of formed stool throughout the colon and prostatomegaly. Patient was given Tylenol suppository for fever and Zofran for nausea. He received Vancomycin and Zosyn in the ED for pneumonia coverage and was admitted to med/surg. SLP was consulted to rule out aspiration. He received azithromycin and ceftriaxone for community acquired pneumonia coverage. He was seen by SLP; diet recommendations placed.  He was able to be weaned to room air prior to discharge. He received meds for  constipation and had multiple bowel movements while admitted. Urine culture resulted with candida. He was determined to be stable and was transitioned to cefuroxime to complete a total of 7 days of treatment. He was then d/c to [facility] on 2/9.    He is seen by the nurse's station today and appears in NAD. Unreliable hisorian.    Allergies - Tuberculin  PMH - HTN, CAD, T2DM, Hypothyroidism, BPH  PSH - unobtainable  FH - unobtainable  SocHx - No EtOH, non smoker      *Review of Systems*  All other systems reviewed are negative except as noted in the HPI      *Vitals*  Date: 2/20/25 - T: 98.1 P:  R: 18 BP: 130/79  SpO2: 97% on RA; Date: 10/3/24 - Wt: 184     *Results/Data*  CBC - Date: 2/9/25 WBC: 6.2 HGB: 11.4 HCT: 35.7 PLT: 258 ;   BMP - Date: 2/9/25 Na: 134 K: 4.1 Cl: 102 CO2: 23 BUN: 20 Cr: 0.83 Glu: 71 Ca: 8.4 ;   LFT - Date: 2/4/25 AST: 14 ALT: 9 ALP: 56 Tbili: 0.7 ;   Coags - Date: INR: PT:  Other - Date: 5/19/20 - TSH: 1.430, HgbA1c: 6.6%; 11/12/20 - CRP: 25.7 D-Dimer: 1.31; 10/4/21 - A1c: 12.7 TSH: 3.100 25-OH-D: 14.29; 3/29/22 - TSH: 2.69; 4/26/22 - TSH: 2.44 25-OH-D: 26.44 A1c: 9.7 ; 9/19/23 - A1c: 8.7% TSH: 3.272  25-OH-D: 33.93 ; 10/8/24  TSH: 0.895    *Physical Exam*  Gen: (+) NAD, (+) well-appearing  HEENT: (+) normocephalic, (+) MMM  Neck: (+) supple  Lungs: (+) CTAB, (-) wheezes, (-) rales, (-) rhonchi  Heart: (+) RRR, (+) S1 S2, (-) murmurs  Pulses: (+) palpable  Abd: (+) soft, (+) NT, (+) ND, (+) BS+  Ext: (-) edema, (-) deformity  MSK: (-) joint swelling  Skin: (+) warm, (+) dry, (-) rash  Neuro: (+) follows commands, (-) tremor, (+) alert

## 2025-02-26 LAB
ATRIAL RATE: 97 BPM
P AXIS: 56 DEGREES
P OFFSET: 150 MS
P ONSET: 85 MS
PR INTERVAL: 264 MS
Q ONSET: 217 MS
QRS COUNT: 16 BEATS
QRS DURATION: 92 MS
QT INTERVAL: 342 MS
QTC CALCULATION(BAZETT): 434 MS
QTC FREDERICIA: 401 MS
R AXIS: 71 DEGREES
T AXIS: 45 DEGREES
T OFFSET: 388 MS
VENTRICULAR RATE: 97 BPM

## 2025-02-27 VITALS
HEART RATE: 80 BPM | DIASTOLIC BLOOD PRESSURE: 80 MMHG | RESPIRATION RATE: 16 BRPM | TEMPERATURE: 97.8 F | SYSTOLIC BLOOD PRESSURE: 124 MMHG

## 2025-02-27 ASSESSMENT — ENCOUNTER SYMPTOMS
FEVER: 0
CHILLS: 0

## 2025-02-27 NOTE — PROGRESS NOTES
PLACE OF SERVICE:  [facility]    This is a subsequent visit.    Subjective   Patient ID: Clive Woody is a 89 y.o. male who presents for Follow-up.    Mr. Clive Woody is a 89-year-old male with history of dementia and diabetes with recent pneumonia.  He suffers from weakness and deconditioning. He is unable to take care for himself.  He requires supportive care.    Review of Systems   Constitutional:  Negative for chills and fever.   Cardiovascular:  Negative for chest pain.   All other systems reviewed and are negative.    Objective   /80   Pulse 80   Temp 36.6 °C (97.8 °F)   Resp 16     Physical Exam  Vitals reviewed.   Constitutional:       General: He is not in acute distress.     Comments: This is a well-developed, well-nourished male, sitting in chair.   HENT:      Right Ear: Tympanic membrane, ear canal and external ear normal.      Left Ear: Tympanic membrane, ear canal and external ear normal.   Eyes:      General: No scleral icterus.     Pupils: Pupils are equal, round, and reactive to light.   Neck:      Vascular: No carotid bruit.   Cardiovascular:      Heart sounds: Normal heart sounds, S1 normal and S2 normal. No murmur heard.     No friction rub.   Pulmonary:      Effort: Pulmonary effort is normal.      Breath sounds: Normal breath sounds and air entry.   Abdominal:      Palpations: There is no hepatomegaly, splenomegaly or mass.   Musculoskeletal:         General: No swelling or deformity. Normal range of motion.      Cervical back: Neck supple.      Right lower leg: No edema.      Left lower leg: No edema.   Lymphadenopathy:      Cervical: No cervical adenopathy.      Upper Body:      Right upper body: No axillary adenopathy.      Left upper body: No axillary adenopathy.      Lower Body: No right inguinal adenopathy. No left inguinal adenopathy.   Neurological:      Mental Status: He is alert.      Cranial Nerves: Cranial nerves 2-12 are intact. No cranial nerve deficit.       Sensory: No sensory deficit.      Motor: Motor function is intact. No weakness.      Gait: Gait is intact.      Deep Tendon Reflexes: Reflexes normal.      Comments: The patient is oriented x2.   Psychiatric:         Mood and Affect: Mood normal. Mood is not anxious or depressed. Affect is not angry.         Behavior: Behavior is not agitated.         Thought Content: Thought content normal.         Judgment: Judgment normal.     LAB WORK:  Laboratory studies were reviewed.    Assessment/Plan   Problem List Items Addressed This Visit             ICD-10-CM    Dementia - Primary F03.90    Benign hypertension I10    ASHD (arteriosclerotic heart disease) I25.10    Diabetes mellitus (Multi) E11.9    At risk for falls Z91.81    Pneumonia, unspecified organism J18.9    Hypothyroidism E03.9     Other Visit Diagnoses         Codes    Schizophrenia, unspecified type     F20.9    Seizure disorder (Multi)     G40.909    Benign prostatic hyperplasia, unspecified whether lower urinary tract symptoms present     N40.0    Weakness     R53.1        1. Dementia, unchanged.  2. Schizophrenia, on medication.  3. Seizure disorder, on antiepileptic.  4. Diabetes, on insulin.  5. Hypertension, medically controlled.  6. Hypothyroidism, on levothyroxine.  7. BPH, on tamsulosin.  8. ASHD, on aspirin.  9. Recent pneumonia.  Continue to monitor.  10. Weakness, on PT/OT.  11. Fall risk, on fall precautions.    Scribe Attestation  By signing my name below, IChastity Scribe attest that this documentation has been prepared under the direction and in the presence of Sergio Islas MD.     All medical record entries made by the scribe were personally dictated by me I have reviewed the chart and agree the record accurately reflects my personal performance of his history physical examination and management

## 2025-03-02 ENCOUNTER — NURSING HOME VISIT (OUTPATIENT)
Dept: POST ACUTE CARE | Facility: EXTERNAL LOCATION | Age: OVER 89
End: 2025-03-02
Payer: COMMERCIAL

## 2025-03-02 DIAGNOSIS — I10 BENIGN HYPERTENSION: ICD-10-CM

## 2025-03-02 DIAGNOSIS — F03.90 DEMENTIA, UNSPECIFIED DEMENTIA SEVERITY, UNSPECIFIED DEMENTIA TYPE, UNSPECIFIED WHETHER BEHAVIORAL, PSYCHOTIC, OR MOOD DISTURBANCE OR ANXIETY (MULTI): Primary | ICD-10-CM

## 2025-03-02 DIAGNOSIS — Z91.81 AT RISK FOR FALLS: ICD-10-CM

## 2025-03-02 DIAGNOSIS — E11.9 TYPE 2 DIABETES MELLITUS WITHOUT COMPLICATION, WITH LONG-TERM CURRENT USE OF INSULIN (MULTI): ICD-10-CM

## 2025-03-02 DIAGNOSIS — G40.909 SEIZURE DISORDER (MULTI): ICD-10-CM

## 2025-03-02 DIAGNOSIS — F20.9 SCHIZOPHRENIA, UNSPECIFIED TYPE: ICD-10-CM

## 2025-03-02 DIAGNOSIS — Z79.4 TYPE 2 DIABETES MELLITUS WITHOUT COMPLICATION, WITH LONG-TERM CURRENT USE OF INSULIN (MULTI): ICD-10-CM

## 2025-03-02 DIAGNOSIS — E03.9 HYPOTHYROIDISM, UNSPECIFIED TYPE: ICD-10-CM

## 2025-03-02 DIAGNOSIS — N40.0 BENIGN PROSTATIC HYPERPLASIA, UNSPECIFIED WHETHER LOWER URINARY TRACT SYMPTOMS PRESENT: ICD-10-CM

## 2025-03-02 DIAGNOSIS — I25.10 ASHD (ARTERIOSCLEROTIC HEART DISEASE): ICD-10-CM

## 2025-03-02 DIAGNOSIS — R53.1 WEAKNESS: ICD-10-CM

## 2025-03-02 PROCEDURE — 99308 SBSQ NF CARE LOW MDM 20: CPT | Performed by: INTERNAL MEDICINE

## 2025-03-02 NOTE — LETTER
Patient: Clive Woody  : 1934    Encounter Date: 2025    PLACE OF SERVICE: [facility].    This is a subsequent visit.    Subjective  Patient ID: Clive Woody is a 89 y.o. male who presents for Follow-up.    Mr. Clive Woody is a 89-year-old male with a history of dementia with seizure disorder.  He is unable to care for himself and requires supportive care.    Review of Systems   Constitutional:  Negative for chills and fever.   Cardiovascular:  Negative for chest pain.   All other systems reviewed and are negative.    Objective  /82   Pulse 78   Temp 36.6 °C (97.9 °F)   Resp 16     Physical Exam  Vitals reviewed.   Constitutional:       General: He is not in acute distress.     Comments: This is a well-developed, well-nourished male, sitting in a chair.   HENT:      Right Ear: Tympanic membrane, ear canal and external ear normal.      Left Ear: Tympanic membrane, ear canal and external ear normal.   Eyes:      General: No scleral icterus.     Pupils: Pupils are equal, round, and reactive to light.   Neck:      Vascular: No carotid bruit.   Cardiovascular:      Heart sounds: Normal heart sounds, S1 normal and S2 normal. No murmur heard.     No friction rub.   Pulmonary:      Effort: Pulmonary effort is normal.      Breath sounds: Normal breath sounds and air entry.   Abdominal:      Palpations: There is no hepatomegaly, splenomegaly or mass.   Musculoskeletal:         General: No swelling or deformity. Normal range of motion.      Cervical back: Neck supple.      Right lower leg: No edema.      Left lower leg: No edema.   Lymphadenopathy:      Cervical: No cervical adenopathy.      Upper Body:      Right upper body: No axillary adenopathy.      Left upper body: No axillary adenopathy.      Lower Body: No right inguinal adenopathy. No left inguinal adenopathy.   Neurological:      Mental Status: He is oriented to person, place, and time.      Cranial Nerves: Cranial nerves  2-12 are intact. No cranial nerve deficit.      Sensory: No sensory deficit.      Motor: Motor function is intact. No weakness.      Gait: Gait is intact.      Deep Tendon Reflexes: Reflexes normal.   Psychiatric:         Mood and Affect: Mood normal. Mood is not anxious or depressed. Affect is not angry.         Behavior: Behavior is not agitated.         Thought Content: Thought content normal.         Judgment: Judgment normal.     LAB WORK: Laboratory studies reviewed.    Assessment/Plan  Problem List Items Addressed This Visit             ICD-10-CM       Advance Directives and General Issues    At risk for falls Z91.81       Cardiac and Vasculature    Benign hypertension I10    ASHD (arteriosclerotic heart disease) I25.10       Endocrine/Metabolic    Diabetes mellitus (Multi) E11.9    Hypothyroidism E03.9       Neuro    Dementia - Primary F03.90     Other Visit Diagnoses         Codes    Schizophrenia, unspecified type     F20.9    Benign prostatic hyperplasia, unspecified whether lower urinary tract symptoms present     N40.0    Seizure disorder (Multi)     G40.909    Weakness     R53.1        1. Dementia, unchanged.  2. Schizophrenia, on medications.  3. Diabetes, on insulin.  4. BPH, on tamsulosin.  5. Hypertension, med controlled.  6. Hypothyroidism, on levothyroxine.  7. Seizure disorder, on anti-epileptic.  8. ASHD, on aspirin.  9. Weakness, on PT/OT.  10. Fall risk, on fall precaution.    Scribe Attestation  By signing my name below, ISofia Scribe attest that this documentation has been prepared under the direction and in the presence of Sergio Islas MD.     All medical record entries made by the scribe were personally dictated by me I have reviewed the chart and agree the record accurately reflects my personal performance of his history physical examination and management      Electronically Signed By: Sergio Islas MD   3/9/25  1:55 AM

## 2025-03-04 VITALS
DIASTOLIC BLOOD PRESSURE: 82 MMHG | RESPIRATION RATE: 16 BRPM | TEMPERATURE: 97.9 F | SYSTOLIC BLOOD PRESSURE: 128 MMHG | HEART RATE: 78 BPM

## 2025-03-04 ASSESSMENT — ENCOUNTER SYMPTOMS
FEVER: 0
CHILLS: 0

## 2025-03-04 NOTE — PROGRESS NOTES
PLACE OF SERVICE: [facility] [place].    This is a subsequent visit.    Subjective   Patient ID: Clive Woody is a 89 y.o. male who presents for Follow-up.    Mr. Clive Woody is a 89-year-old male with a history of dementia with seizure disorder.  He is unable to care for himself and requires supportive care.    Review of Systems   Constitutional:  Negative for chills and fever.   Cardiovascular:  Negative for chest pain.   All other systems reviewed and are negative.    Objective   /82   Pulse 78   Temp 36.6 °C (97.9 °F)   Resp 16     Physical Exam  Vitals reviewed.   Constitutional:       General: He is not in acute distress.     Comments: This is a well-developed, well-nourished male, sitting in a chair.   HENT:      Right Ear: Tympanic membrane, ear canal and external ear normal.      Left Ear: Tympanic membrane, ear canal and external ear normal.   Eyes:      General: No scleral icterus.     Pupils: Pupils are equal, round, and reactive to light.   Neck:      Vascular: No carotid bruit.   Cardiovascular:      Heart sounds: Normal heart sounds, S1 normal and S2 normal. No murmur heard.     No friction rub.   Pulmonary:      Effort: Pulmonary effort is normal.      Breath sounds: Normal breath sounds and air entry.   Abdominal:      Palpations: There is no hepatomegaly, splenomegaly or mass.   Musculoskeletal:         General: No swelling or deformity. Normal range of motion.      Cervical back: Neck supple.      Right lower leg: No edema.      Left lower leg: No edema.   Lymphadenopathy:      Cervical: No cervical adenopathy.      Upper Body:      Right upper body: No axillary adenopathy.      Left upper body: No axillary adenopathy.      Lower Body: No right inguinal adenopathy. No left inguinal adenopathy.   Neurological:      Mental Status: He is oriented to person, place, and time.      Cranial Nerves: Cranial nerves 2-12 are intact. No cranial nerve deficit.      Sensory: No sensory  deficit.      Motor: Motor function is intact. No weakness.      Gait: Gait is intact.      Deep Tendon Reflexes: Reflexes normal.   Psychiatric:         Mood and Affect: Mood normal. Mood is not anxious or depressed. Affect is not angry.         Behavior: Behavior is not agitated.         Thought Content: Thought content normal.         Judgment: Judgment normal.     LAB WORK: Laboratory studies reviewed.    Assessment/Plan   Problem List Items Addressed This Visit             ICD-10-CM       Advance Directives and General Issues    At risk for falls Z91.81       Cardiac and Vasculature    Benign hypertension I10    ASHD (arteriosclerotic heart disease) I25.10       Endocrine/Metabolic    Diabetes mellitus (Multi) E11.9    Hypothyroidism E03.9       Neuro    Dementia - Primary F03.90     Other Visit Diagnoses         Codes    Schizophrenia, unspecified type     F20.9    Benign prostatic hyperplasia, unspecified whether lower urinary tract symptoms present     N40.0    Seizure disorder (Multi)     G40.909    Weakness     R53.1        1. Dementia, unchanged.  2. Schizophrenia, on medications.  3. Diabetes, on insulin.  4. BPH, on tamsulosin.  5. Hypertension, med controlled.  6. Hypothyroidism, on levothyroxine.  7. Seizure disorder, on anti-epileptic.  8. ASHD, on aspirin.  9. Weakness, on PT/OT.  10. Fall risk, on fall precaution.    Scribe Attestation  By signing my name below, I, Joyce Junior attest that this documentation has been prepared under the direction and in the presence of Sergio Islas MD.     All medical record entries made by the scribe were personally dictated by me I have reviewed the chart and agree the record accurately reflects my personal performance of his history physical examination and management

## 2025-04-12 ENCOUNTER — NURSING HOME VISIT (OUTPATIENT)
Dept: POST ACUTE CARE | Facility: EXTERNAL LOCATION | Age: OVER 89
End: 2025-04-12
Payer: COMMERCIAL

## 2025-04-12 DIAGNOSIS — F03.90 DEMENTIA, UNSPECIFIED DEMENTIA SEVERITY, UNSPECIFIED DEMENTIA TYPE, UNSPECIFIED WHETHER BEHAVIORAL, PSYCHOTIC, OR MOOD DISTURBANCE OR ANXIETY (MULTI): ICD-10-CM

## 2025-04-12 DIAGNOSIS — E11.9 TYPE 2 DIABETES MELLITUS WITHOUT COMPLICATION, WITH LONG-TERM CURRENT USE OF INSULIN: Primary | ICD-10-CM

## 2025-04-12 DIAGNOSIS — Z91.81 AT RISK FOR FALLS: ICD-10-CM

## 2025-04-12 DIAGNOSIS — F20.9 SCHIZOPHRENIA, UNSPECIFIED TYPE: ICD-10-CM

## 2025-04-12 DIAGNOSIS — Z79.4 TYPE 2 DIABETES MELLITUS WITHOUT COMPLICATION, WITH LONG-TERM CURRENT USE OF INSULIN: Primary | ICD-10-CM

## 2025-04-12 DIAGNOSIS — R53.1 WEAKNESS: ICD-10-CM

## 2025-04-12 DIAGNOSIS — I10 BENIGN HYPERTENSION: ICD-10-CM

## 2025-04-12 DIAGNOSIS — N40.0 BENIGN PROSTATIC HYPERPLASIA, UNSPECIFIED WHETHER LOWER URINARY TRACT SYMPTOMS PRESENT: ICD-10-CM

## 2025-04-12 DIAGNOSIS — E03.9 HYPOTHYROIDISM, UNSPECIFIED TYPE: ICD-10-CM

## 2025-04-12 DIAGNOSIS — I25.10 ASHD (ARTERIOSCLEROTIC HEART DISEASE): ICD-10-CM

## 2025-04-12 DIAGNOSIS — G40.909 SEIZURE DISORDER (MULTI): ICD-10-CM

## 2025-04-12 DIAGNOSIS — R47.01 APHASIA: ICD-10-CM

## 2025-04-12 PROCEDURE — 99309 SBSQ NF CARE MODERATE MDM 30: CPT | Performed by: INTERNAL MEDICINE

## 2025-04-12 NOTE — LETTER
Patient: lCive Woody  : 1934    Encounter Date: 2025    PLACE OF SERVICE:  [facility]    This is a subsequent visit.    Subjective  Patient ID: Clive Woody is a 89 y.o. male who presents for Follow-up.    Mr. Clive Woody is a 89-year-old male with history of diabetes with dementia and schizophrenia.  He is aphasic and requires supportive care.    Review of Systems   Constitutional:  Negative for chills and fever.   Cardiovascular:  Negative for chest pain.   All other systems reviewed and are negative.    Objective  /82   Pulse 80   Temp 36.6 °C (97.8 °F)   Resp 16     Physical Exam  Vitals reviewed.   Constitutional:       General: He is not in acute distress.     Comments: This is a well-developed, well-nourished male, sitting in a chair   HENT:      Right Ear: Tympanic membrane, ear canal and external ear normal.      Left Ear: Tympanic membrane, ear canal and external ear normal.   Eyes:      General: No scleral icterus.     Pupils: Pupils are equal, round, and reactive to light.   Neck:      Vascular: No carotid bruit.   Cardiovascular:      Heart sounds: Normal heart sounds, S1 normal and S2 normal. No murmur heard.     No friction rub.   Pulmonary:      Effort: Pulmonary effort is normal.      Breath sounds: Normal breath sounds and air entry.   Abdominal:      Palpations: There is no hepatomegaly, splenomegaly or mass.   Musculoskeletal:         General: No swelling or deformity. Normal range of motion.      Cervical back: Neck supple.      Right lower leg: No edema.      Left lower leg: No edema.   Lymphadenopathy:      Cervical: No cervical adenopathy.      Upper Body:      Right upper body: No axillary adenopathy.      Left upper body: No axillary adenopathy.      Lower Body: No right inguinal adenopathy. No left inguinal adenopathy.   Neurological:      Cranial Nerves: Cranial nerves 2-12 are intact. No cranial nerve deficit.      Sensory: No sensory  deficit.      Motor: Motor function is intact. No weakness.      Gait: Gait is intact.      Deep Tendon Reflexes: Reflexes normal.      Comments: The patient is alert and oriented x2.   Psychiatric:         Mood and Affect: Mood normal. Mood is not anxious or depressed. Affect is not angry.         Behavior: Behavior is not agitated.         Thought Content: Thought content normal.         Judgment: Judgment normal.     LAB WORK:  Laboratory studies were reviewed.    Assessment/Plan  Problem List Items Addressed This Visit           ICD-10-CM    Dementia F03.90    Benign hypertension I10    ASHD (arteriosclerotic heart disease) I25.10    Diabetes mellitus (Multi) - Primary E11.9    At risk for falls Z91.81    Hypothyroidism E03.9     Other Visit Diagnoses         Codes      Schizophrenia, unspecified type     F20.9      Aphasia     R47.01      Seizure disorder (Multi)     G40.909      Benign prostatic hyperplasia, unspecified whether lower urinary tract symptoms present     N40.0      Weakness     R53.1        1. Diabetes, on insulin.  2. Dementia, unchanged.  3. Schizophrenia, on medication.  4. Aphasia with speech therapy.  5. Seizure disorder, on anti-epileptic.  6. ASHD, on aspirin.  7. Hypothyroidism, on levothyroxine.  8. Hypertension, medically controlled.  9. BPH, on tamsulosin.  10. Weakness, on PT/OT.  11. Fall risk, on fall precautions.    Scribe Attestation  By signing my name below, IChastity Scribe attest that this documentation has been prepared under the direction and in the presence of Sergio Islas MD.     All medical record entries made by the scribe were personally dictated by me I have reviewed the chart and agree the record accurately reflects my personal performance of his history physical examination and management      Electronically Signed By: Sergio Islas MD   4/20/25 11:12 PM

## 2025-04-16 VITALS
TEMPERATURE: 97.8 F | SYSTOLIC BLOOD PRESSURE: 126 MMHG | HEART RATE: 80 BPM | DIASTOLIC BLOOD PRESSURE: 82 MMHG | RESPIRATION RATE: 16 BRPM

## 2025-04-16 ASSESSMENT — ENCOUNTER SYMPTOMS
CHILLS: 0
FEVER: 0

## 2025-04-16 NOTE — PROGRESS NOTES
PLACE OF SERVICE:  [facility]    This is a subsequent visit.    Subjective   Patient ID: Clvie Woody is a 89 y.o. male who presents for Follow-up.    Mr. Clive Woody is a 89-year-old male with history of diabetes with dementia and schizophrenia.  He is aphasic and requires supportive care.    Review of Systems   Constitutional:  Negative for chills and fever.   Cardiovascular:  Negative for chest pain.   All other systems reviewed and are negative.    Objective   /82   Pulse 80   Temp 36.6 °C (97.8 °F)   Resp 16     Physical Exam  Vitals reviewed.   Constitutional:       General: He is not in acute distress.     Comments: This is a well-developed, well-nourished male, sitting in a chair   HENT:      Right Ear: Tympanic membrane, ear canal and external ear normal.      Left Ear: Tympanic membrane, ear canal and external ear normal.   Eyes:      General: No scleral icterus.     Pupils: Pupils are equal, round, and reactive to light.   Neck:      Vascular: No carotid bruit.   Cardiovascular:      Heart sounds: Normal heart sounds, S1 normal and S2 normal. No murmur heard.     No friction rub.   Pulmonary:      Effort: Pulmonary effort is normal.      Breath sounds: Normal breath sounds and air entry.   Abdominal:      Palpations: There is no hepatomegaly, splenomegaly or mass.   Musculoskeletal:         General: No swelling or deformity. Normal range of motion.      Cervical back: Neck supple.      Right lower leg: No edema.      Left lower leg: No edema.   Lymphadenopathy:      Cervical: No cervical adenopathy.      Upper Body:      Right upper body: No axillary adenopathy.      Left upper body: No axillary adenopathy.      Lower Body: No right inguinal adenopathy. No left inguinal adenopathy.   Neurological:      Cranial Nerves: Cranial nerves 2-12 are intact. No cranial nerve deficit.      Sensory: No sensory deficit.      Motor: Motor function is intact. No weakness.      Gait: Gait is  intact.      Deep Tendon Reflexes: Reflexes normal.      Comments: The patient is alert and oriented x2.   Psychiatric:         Mood and Affect: Mood normal. Mood is not anxious or depressed. Affect is not angry.         Behavior: Behavior is not agitated.         Thought Content: Thought content normal.         Judgment: Judgment normal.     LAB WORK:  Laboratory studies were reviewed.    Assessment/Plan   Problem List Items Addressed This Visit           ICD-10-CM    Dementia F03.90    Benign hypertension I10    ASHD (arteriosclerotic heart disease) I25.10    Diabetes mellitus (Multi) - Primary E11.9    At risk for falls Z91.81    Hypothyroidism E03.9     Other Visit Diagnoses         Codes      Schizophrenia, unspecified type     F20.9      Aphasia     R47.01      Seizure disorder (Multi)     G40.909      Benign prostatic hyperplasia, unspecified whether lower urinary tract symptoms present     N40.0      Weakness     R53.1        1. Diabetes, on insulin.  2. Dementia, unchanged.  3. Schizophrenia, on medication.  4. Aphasia with speech therapy.  5. Seizure disorder, on anti-epileptic.  6. ASHD, on aspirin.  7. Hypothyroidism, on levothyroxine.  8. Hypertension, medically controlled.  9. BPH, on tamsulosin.  10. Weakness, on PT/OT.  11. Fall risk, on fall precautions.    Scribe Attestation  By signing my name below, IChastity Scribe attest that this documentation has been prepared under the direction and in the presence of Sergio Islas MD.     All medical record entries made by the scribe were personally dictated by me I have reviewed the chart and agree the record accurately reflects my personal performance of his history physical examination and management

## 2025-05-05 ENCOUNTER — NURSING HOME VISIT (OUTPATIENT)
Dept: POST ACUTE CARE | Facility: EXTERNAL LOCATION | Age: OVER 89
End: 2025-05-05
Payer: COMMERCIAL

## 2025-05-05 DIAGNOSIS — F03.90 DEMENTIA, UNSPECIFIED DEMENTIA SEVERITY, UNSPECIFIED DEMENTIA TYPE, UNSPECIFIED WHETHER BEHAVIORAL, PSYCHOTIC, OR MOOD DISTURBANCE OR ANXIETY (MULTI): ICD-10-CM

## 2025-05-05 DIAGNOSIS — I10 BENIGN HYPERTENSION: ICD-10-CM

## 2025-05-05 DIAGNOSIS — Z79.4 TYPE 2 DIABETES MELLITUS WITHOUT COMPLICATION, WITH LONG-TERM CURRENT USE OF INSULIN: ICD-10-CM

## 2025-05-05 DIAGNOSIS — G40.909 SEIZURE DISORDER (MULTI): ICD-10-CM

## 2025-05-05 DIAGNOSIS — R53.1 WEAKNESS: ICD-10-CM

## 2025-05-05 DIAGNOSIS — N40.0 BENIGN PROSTATIC HYPERPLASIA, UNSPECIFIED WHETHER LOWER URINARY TRACT SYMPTOMS PRESENT: ICD-10-CM

## 2025-05-05 DIAGNOSIS — E11.9 TYPE 2 DIABETES MELLITUS WITHOUT COMPLICATION, WITH LONG-TERM CURRENT USE OF INSULIN: ICD-10-CM

## 2025-05-05 DIAGNOSIS — R47.01 APHASIA: ICD-10-CM

## 2025-05-05 DIAGNOSIS — I25.10 ASHD (ARTERIOSCLEROTIC HEART DISEASE): ICD-10-CM

## 2025-05-05 DIAGNOSIS — Z91.81 AT RISK FOR FALLS: ICD-10-CM

## 2025-05-05 DIAGNOSIS — F20.9 SCHIZOPHRENIA, UNSPECIFIED TYPE: Primary | ICD-10-CM

## 2025-05-05 DIAGNOSIS — E03.9 HYPOTHYROIDISM, UNSPECIFIED TYPE: ICD-10-CM

## 2025-05-05 PROCEDURE — 99309 SBSQ NF CARE MODERATE MDM 30: CPT | Performed by: INTERNAL MEDICINE

## 2025-05-05 NOTE — LETTER
Patient: Clive Woody  : 1934    Encounter Date: 2025    PLACE OF SERVICE:  [facility]    This is a subsequent visit.    Subjective  Patient ID: Clive Woody is a 89 y.o. male who presents for Follow-up.    Mr. Clive Woody is a 89-year-old male with history of schizophrenia and dementia.  He is aphasic and unable to care for himself.  He requires supportive care.    Review of Systems   Constitutional:  Negative for chills and fever.   Cardiovascular:  Negative for chest pain.   All other systems reviewed and are negative.    Objective  /82   Pulse 78   Temp 36.6 °C (97.8 °F)   Resp 16     Physical Exam  Vitals reviewed.   Constitutional:       General: He is not in acute distress.     Comments: This is a well-developed, well-nourished male, sitting in a chair.   HENT:      Right Ear: Tympanic membrane, ear canal and external ear normal.      Left Ear: Tympanic membrane, ear canal and external ear normal.   Eyes:      General: No scleral icterus.     Pupils: Pupils are equal, round, and reactive to light.   Neck:      Vascular: No carotid bruit.   Cardiovascular:      Heart sounds: Normal heart sounds, S1 normal and S2 normal. No murmur heard.     No friction rub.   Pulmonary:      Effort: Pulmonary effort is normal.      Breath sounds: Normal breath sounds and air entry.   Abdominal:      Palpations: There is no hepatomegaly, splenomegaly or mass.   Musculoskeletal:         General: No swelling or deformity. Normal range of motion.      Cervical back: Neck supple.      Right lower leg: No edema.      Left lower leg: No edema.   Lymphadenopathy:      Cervical: No cervical adenopathy.      Upper Body:      Right upper body: No axillary adenopathy.      Left upper body: No axillary adenopathy.      Lower Body: No right inguinal adenopathy. No left inguinal adenopathy.   Neurological:      Mental Status: He is alert.      Cranial Nerves: Cranial nerves 2-12 are intact. No  cranial nerve deficit.      Sensory: No sensory deficit.      Motor: Motor function is intact. No weakness.      Gait: Gait is intact.      Deep Tendon Reflexes: Reflexes normal.      Comments: The patient is oriented x2.  The patient is aphasic.   Psychiatric:         Mood and Affect: Mood normal. Mood is not anxious or depressed. Affect is not angry.         Behavior: Behavior is not agitated.         Thought Content: Thought content normal.         Judgment: Judgment normal.     LAB WORK: Laboratory studies reviewed.    Assessment/Plan  Problem List Items Addressed This Visit           ICD-10-CM    Dementia F03.90    Benign hypertension I10    ASHD (arteriosclerotic heart disease) I25.10    Diabetes mellitus (Multi) E11.9    At risk for falls Z91.81    Hypothyroidism E03.9     Other Visit Diagnoses         Codes      Schizophrenia, unspecified type    -  Primary F20.9      Seizure disorder (Multi)     G40.909      Aphasia     R47.01      Benign prostatic hyperplasia, unspecified whether lower urinary tract symptoms present     N40.0      Weakness     R53.1        1. Schizophrenia, on medication.  2. Dementia, unchanged.  3. Diabetes, on insulin.  4. Seizure disorder, on antiepileptic.  5. Aphasia, with speech therapy.  6. Hypertension, med controlled.  7. BPH, on tamsulosin.  8. Hypothyroidism, on levothyroxine.  9. ASHD, on aspirin.  10. Weakness on PT/OT.  11. Fall risk, fall precaution.    Scribe Attestation  By signing my name below, IChastity Scribe attest that this documentation has been prepared under the direction and in the presence of Sergio Islas MD.     All medical record entries made by the scribe were personally dictated by me I have reviewed the chart and agree the record accurately reflects my personal performance of his history physical examination and management      Electronically Signed By: Sergio Islas MD   5/12/25 12:31 AM

## 2025-05-08 VITALS
RESPIRATION RATE: 16 BRPM | SYSTOLIC BLOOD PRESSURE: 126 MMHG | HEART RATE: 78 BPM | DIASTOLIC BLOOD PRESSURE: 82 MMHG | TEMPERATURE: 97.8 F

## 2025-05-08 ASSESSMENT — ENCOUNTER SYMPTOMS
FEVER: 0
CHILLS: 0

## 2025-05-08 NOTE — PROGRESS NOTES
PLACE OF SERVICE:  [facility]    This is a subsequent visit.    Subjective   Patient ID: Clive Woody is a 89 y.o. male who presents for Follow-up.    Mr. Clive Woody is a 89-year-old male with history of schizophrenia and dementia.  He is aphasic and unable to care for himself.  He requires supportive care.    Review of Systems   Constitutional:  Negative for chills and fever.   Cardiovascular:  Negative for chest pain.   All other systems reviewed and are negative.    Objective   /82   Pulse 78   Temp 36.6 °C (97.8 °F)   Resp 16     Physical Exam  Vitals reviewed.   Constitutional:       General: He is not in acute distress.     Comments: This is a well-developed, well-nourished male, sitting in a chair.   HENT:      Right Ear: Tympanic membrane, ear canal and external ear normal.      Left Ear: Tympanic membrane, ear canal and external ear normal.   Eyes:      General: No scleral icterus.     Pupils: Pupils are equal, round, and reactive to light.   Neck:      Vascular: No carotid bruit.   Cardiovascular:      Heart sounds: Normal heart sounds, S1 normal and S2 normal. No murmur heard.     No friction rub.   Pulmonary:      Effort: Pulmonary effort is normal.      Breath sounds: Normal breath sounds and air entry.   Abdominal:      Palpations: There is no hepatomegaly, splenomegaly or mass.   Musculoskeletal:         General: No swelling or deformity. Normal range of motion.      Cervical back: Neck supple.      Right lower leg: No edema.      Left lower leg: No edema.   Lymphadenopathy:      Cervical: No cervical adenopathy.      Upper Body:      Right upper body: No axillary adenopathy.      Left upper body: No axillary adenopathy.      Lower Body: No right inguinal adenopathy. No left inguinal adenopathy.   Neurological:      Mental Status: He is alert.      Cranial Nerves: Cranial nerves 2-12 are intact. No cranial nerve deficit.      Sensory: No sensory deficit.      Motor: Motor  function is intact. No weakness.      Gait: Gait is intact.      Deep Tendon Reflexes: Reflexes normal.      Comments: The patient is oriented x2.  The patient is aphasic.   Psychiatric:         Mood and Affect: Mood normal. Mood is not anxious or depressed. Affect is not angry.         Behavior: Behavior is not agitated.         Thought Content: Thought content normal.         Judgment: Judgment normal.     LAB WORK: Laboratory studies reviewed.    Assessment/Plan   Problem List Items Addressed This Visit           ICD-10-CM    Dementia F03.90    Benign hypertension I10    ASHD (arteriosclerotic heart disease) I25.10    Diabetes mellitus (Multi) E11.9    At risk for falls Z91.81    Hypothyroidism E03.9     Other Visit Diagnoses         Codes      Schizophrenia, unspecified type    -  Primary F20.9      Seizure disorder (Multi)     G40.909      Aphasia     R47.01      Benign prostatic hyperplasia, unspecified whether lower urinary tract symptoms present     N40.0      Weakness     R53.1        1. Schizophrenia, on medication.  2. Dementia, unchanged.  3. Diabetes, on insulin.  4. Seizure disorder, on antiepileptic.  5. Aphasia, with speech therapy.  6. Hypertension, med controlled.  7. BPH, on tamsulosin.  8. Hypothyroidism, on levothyroxine.  9. ASHD, on aspirin.  10. Weakness on PT/OT.  11. Fall risk, fall precaution.    Scribe Attestation  By signing my name below, IChastity Scribe attest that this documentation has been prepared under the direction and in the presence of Sergio Islas MD.     All medical record entries made by the scribe were personally dictated by me I have reviewed the chart and agree the record accurately reflects my personal performance of his history physical examination and management

## 2025-06-02 ENCOUNTER — NURSING HOME VISIT (OUTPATIENT)
Dept: POST ACUTE CARE | Facility: EXTERNAL LOCATION | Age: OVER 89
End: 2025-06-02
Payer: COMMERCIAL

## 2025-06-02 DIAGNOSIS — E11.9 TYPE 2 DIABETES MELLITUS WITHOUT COMPLICATION, WITH LONG-TERM CURRENT USE OF INSULIN: Primary | ICD-10-CM

## 2025-06-02 DIAGNOSIS — R47.01 APHASIA: ICD-10-CM

## 2025-06-02 DIAGNOSIS — Z79.4 TYPE 2 DIABETES MELLITUS WITHOUT COMPLICATION, WITH LONG-TERM CURRENT USE OF INSULIN: Primary | ICD-10-CM

## 2025-06-02 DIAGNOSIS — E03.9 HYPOTHYROIDISM, UNSPECIFIED TYPE: ICD-10-CM

## 2025-06-02 DIAGNOSIS — Z91.81 AT RISK FOR FALLS: ICD-10-CM

## 2025-06-02 DIAGNOSIS — R53.1 WEAKNESS: ICD-10-CM

## 2025-06-02 DIAGNOSIS — F03.90 DEMENTIA, UNSPECIFIED DEMENTIA SEVERITY, UNSPECIFIED DEMENTIA TYPE, UNSPECIFIED WHETHER BEHAVIORAL, PSYCHOTIC, OR MOOD DISTURBANCE OR ANXIETY (MULTI): ICD-10-CM

## 2025-06-02 DIAGNOSIS — G40.909 SEIZURE DISORDER (MULTI): ICD-10-CM

## 2025-06-02 DIAGNOSIS — I10 BENIGN HYPERTENSION: ICD-10-CM

## 2025-06-02 DIAGNOSIS — I25.10 ASHD (ARTERIOSCLEROTIC HEART DISEASE): ICD-10-CM

## 2025-06-02 DIAGNOSIS — F20.9 SCHIZOPHRENIA, UNSPECIFIED TYPE: ICD-10-CM

## 2025-06-02 DIAGNOSIS — N40.0 BENIGN PROSTATIC HYPERPLASIA, UNSPECIFIED WHETHER LOWER URINARY TRACT SYMPTOMS PRESENT: ICD-10-CM

## 2025-06-02 NOTE — LETTER
Patient: Clive Woody  : 1934    Encounter Date: 2025    PLACE OF SERVICE:  [facility].    This is a subsequent visit.    Subjective  Patient ID: Clive Woody is a 89 y.o. male who presents for Follow-up.    Mr. Clive Woody is a 89-year-old male with history of dementia with diabetes and seizure disorder.  He is unable to care for himself and requires supportive care.    Review of Systems   Constitutional:  Negative for chills and fever.   Cardiovascular:  Negative for chest pain.   All other systems reviewed and are negative.    Objective  /78   Pulse 76   Temp 36.5 °C (97.7 °F)   Resp 16     Physical Exam  Vitals reviewed.   Constitutional:       General: He is not in acute distress.     Comments: This is a well-developed, well-nourished male, sitting in a chair.   HENT:      Right Ear: Tympanic membrane, ear canal and external ear normal.      Left Ear: Tympanic membrane, ear canal and external ear normal.   Eyes:      General: No scleral icterus.     Pupils: Pupils are equal, round, and reactive to light.   Neck:      Vascular: No carotid bruit.   Cardiovascular:      Heart sounds: Normal heart sounds, S1 normal and S2 normal. No murmur heard.     No friction rub.   Pulmonary:      Effort: Pulmonary effort is normal.      Breath sounds: Normal breath sounds and air entry.   Abdominal:      Palpations: There is no hepatomegaly, splenomegaly or mass.   Musculoskeletal:         General: No swelling or deformity. Normal range of motion.      Cervical back: Neck supple.      Right lower leg: No edema.      Left lower leg: No edema.   Lymphadenopathy:      Cervical: No cervical adenopathy.      Upper Body:      Right upper body: No axillary adenopathy.      Left upper body: No axillary adenopathy.      Lower Body: No right inguinal adenopathy. No left inguinal adenopathy.   Neurological:      Mental Status: He is alert.      Cranial Nerves: Cranial nerves 2-12 are intact.  No cranial nerve deficit.      Sensory: No sensory deficit.      Motor: Motor function is intact. No weakness.      Gait: Gait is intact.      Deep Tendon Reflexes: Reflexes normal.      Comments: The patient is aphasic.  The patient is oriented x2.   Psychiatric:         Mood and Affect: Mood normal. Mood is not anxious or depressed. Affect is not angry.         Behavior: Behavior is not agitated.         Thought Content: Thought content normal.         Judgment: Judgment normal.     LAB WORK:  Laboratory studies were reviewed.    Assessment/Plan  Problem List Items Addressed This Visit           ICD-10-CM       Advance Directives and General Issues    At risk for falls Z91.81       Cardiac and Vasculature    Benign hypertension I10    ASHD (arteriosclerotic heart disease) I25.10       Endocrine/Metabolic    Diabetes mellitus (Multi) - Primary E11.9    Hypothyroidism E03.9       Neuro    Dementia F03.90     Other Visit Diagnoses         Codes      Aphasia     R47.01      Seizure disorder (Multi)     G40.909      Schizophrenia, unspecified type     F20.9      Benign prostatic hyperplasia, unspecified whether lower urinary tract symptoms present     N40.0      Weakness     R53.1        1. Diabetes, on insulin.  2. Dementia, unchanged.  3. Aphasia with speech therapy.  4. Seizure disorder, on antiepileptic.  5. Schizophrenia, on medication.  6. Hypothyroidism, on levothyroxine.  7. Hypertension, medically controlled.  8. BPH, on tamsulosin.  9. ASHD, on aspirin.  10. Weakness, on PT/OT.  11. Fall risk, on fall precautions.    Scribe Attestation  By signing my name below, ISofia Scribe attest that this documentation has been prepared under the direction and in the presence of Sergio Islas MD.     All medical record entries made by the scribe were personally dictated by me I have reviewed the chart and agree the record accurately reflects my personal performance of his history physical examination and  management      Electronically Signed By: Sergio Islas MD   6/13/25 10:43 PM

## 2025-06-09 VITALS
DIASTOLIC BLOOD PRESSURE: 78 MMHG | RESPIRATION RATE: 16 BRPM | HEART RATE: 76 BPM | SYSTOLIC BLOOD PRESSURE: 126 MMHG | TEMPERATURE: 97.7 F

## 2025-06-09 ASSESSMENT — ENCOUNTER SYMPTOMS
FEVER: 0
CHILLS: 0

## 2025-06-09 NOTE — PROGRESS NOTES
PLACE OF SERVICE:  [facility].    This is a subsequent visit.    Subjective   Patient ID: Clive Woody is a 89 y.o. male who presents for Follow-up.    Mr. Clive Woody is a 89-year-old male with history of dementia with diabetes and seizure disorder.  He is unable to care for himself and requires supportive care.    Review of Systems   Constitutional:  Negative for chills and fever.   Cardiovascular:  Negative for chest pain.   All other systems reviewed and are negative.    Objective   /78   Pulse 76   Temp 36.5 °C (97.7 °F)   Resp 16     Physical Exam  Vitals reviewed.   Constitutional:       General: He is not in acute distress.     Comments: This is a well-developed, well-nourished male, sitting in a chair.   HENT:      Right Ear: Tympanic membrane, ear canal and external ear normal.      Left Ear: Tympanic membrane, ear canal and external ear normal.   Eyes:      General: No scleral icterus.     Pupils: Pupils are equal, round, and reactive to light.   Neck:      Vascular: No carotid bruit.   Cardiovascular:      Heart sounds: Normal heart sounds, S1 normal and S2 normal. No murmur heard.     No friction rub.   Pulmonary:      Effort: Pulmonary effort is normal.      Breath sounds: Normal breath sounds and air entry.   Abdominal:      Palpations: There is no hepatomegaly, splenomegaly or mass.   Musculoskeletal:         General: No swelling or deformity. Normal range of motion.      Cervical back: Neck supple.      Right lower leg: No edema.      Left lower leg: No edema.   Lymphadenopathy:      Cervical: No cervical adenopathy.      Upper Body:      Right upper body: No axillary adenopathy.      Left upper body: No axillary adenopathy.      Lower Body: No right inguinal adenopathy. No left inguinal adenopathy.   Neurological:      Mental Status: He is alert.      Cranial Nerves: Cranial nerves 2-12 are intact. No cranial nerve deficit.      Sensory: No sensory deficit.      Motor:  Motor function is intact. No weakness.      Gait: Gait is intact.      Deep Tendon Reflexes: Reflexes normal.      Comments: The patient is aphasic.  The patient is oriented x2.   Psychiatric:         Mood and Affect: Mood normal. Mood is not anxious or depressed. Affect is not angry.         Behavior: Behavior is not agitated.         Thought Content: Thought content normal.         Judgment: Judgment normal.     LAB WORK:  Laboratory studies were reviewed.    Assessment/Plan   Problem List Items Addressed This Visit           ICD-10-CM       Advance Directives and General Issues    At risk for falls Z91.81       Cardiac and Vasculature    Benign hypertension I10    ASHD (arteriosclerotic heart disease) I25.10       Endocrine/Metabolic    Diabetes mellitus (Multi) - Primary E11.9    Hypothyroidism E03.9       Neuro    Dementia F03.90     Other Visit Diagnoses         Codes      Aphasia     R47.01      Seizure disorder (Multi)     G40.909      Schizophrenia, unspecified type     F20.9      Benign prostatic hyperplasia, unspecified whether lower urinary tract symptoms present     N40.0      Weakness     R53.1        1. Diabetes, on insulin.  2. Dementia, unchanged.  3. Aphasia with speech therapy.  4. Seizure disorder, on antiepileptic.  5. Schizophrenia, on medication.  6. Hypothyroidism, on levothyroxine.  7. Hypertension, medically controlled.  8. BPH, on tamsulosin.  9. ASHD, on aspirin.  10. Weakness, on PT/OT.  11. Fall risk, on fall precautions.    Scribe Attestation  By signing my name below, ISofia Scribe attest that this documentation has been prepared under the direction and in the presence of Sergio Islas MD.     All medical record entries made by the scribe were personally dictated by me I have reviewed the chart and agree the record accurately reflects my personal performance of his history physical examination and management

## 2025-07-07 ENCOUNTER — NURSING HOME VISIT (OUTPATIENT)
Dept: POST ACUTE CARE | Facility: EXTERNAL LOCATION | Age: OVER 89
End: 2025-07-07
Payer: COMMERCIAL

## 2025-07-07 DIAGNOSIS — E11.9 TYPE 2 DIABETES MELLITUS WITHOUT COMPLICATION, WITH LONG-TERM CURRENT USE OF INSULIN: ICD-10-CM

## 2025-07-07 DIAGNOSIS — Z79.4 TYPE 2 DIABETES MELLITUS WITHOUT COMPLICATION, WITH LONG-TERM CURRENT USE OF INSULIN: ICD-10-CM

## 2025-07-07 DIAGNOSIS — N40.0 BENIGN PROSTATIC HYPERPLASIA, UNSPECIFIED WHETHER LOWER URINARY TRACT SYMPTOMS PRESENT: ICD-10-CM

## 2025-07-07 DIAGNOSIS — R47.01 APHASIA: ICD-10-CM

## 2025-07-07 DIAGNOSIS — F20.9 SCHIZOPHRENIA, UNSPECIFIED TYPE: ICD-10-CM

## 2025-07-07 DIAGNOSIS — E03.9 HYPOTHYROIDISM, UNSPECIFIED TYPE: ICD-10-CM

## 2025-07-07 DIAGNOSIS — I25.10 ASHD (ARTERIOSCLEROTIC HEART DISEASE): ICD-10-CM

## 2025-07-07 DIAGNOSIS — F03.90 DEMENTIA, UNSPECIFIED DEMENTIA SEVERITY, UNSPECIFIED DEMENTIA TYPE, UNSPECIFIED WHETHER BEHAVIORAL, PSYCHOTIC, OR MOOD DISTURBANCE OR ANXIETY (MULTI): Primary | ICD-10-CM

## 2025-07-07 DIAGNOSIS — Z91.81 AT RISK FOR FALLS: ICD-10-CM

## 2025-07-07 DIAGNOSIS — G40.909 SEIZURE DISORDER (MULTI): ICD-10-CM

## 2025-07-07 DIAGNOSIS — I10 BENIGN HYPERTENSION: ICD-10-CM

## 2025-07-07 NOTE — LETTER
Patient: Clive Woody  : 1934    Encounter Date: 2025    PLACE OF SERVICE:  [facility]    This is a subsequent visit.    Subjective  Patient ID: Clive Woody is a 89 y.o. male who presents for Follow-up.    Mr. Clive Woody is a 89-year-old male with history of dementia with seizure disorder and diabetes.  He is unable to care for himself and requires supportive care.    Review of Systems   Constitutional:  Negative for chills and fever.   Cardiovascular:  Negative for chest pain.   All other systems reviewed and are negative.    Objective  /82   Pulse 78   Temp 36.7 °C (98 °F)   Resp 16     Physical Exam  Vitals reviewed.   Constitutional:       General: He is not in acute distress.     Comments: This is a well-developed, well-nourished male, sitting in a chair   HENT:      Right Ear: Tympanic membrane, ear canal and external ear normal.      Left Ear: Tympanic membrane, ear canal and external ear normal.   Eyes:      General: No scleral icterus.     Pupils: Pupils are equal, round, and reactive to light.   Neck:      Vascular: No carotid bruit.   Cardiovascular:      Heart sounds: Normal heart sounds, S1 normal and S2 normal. No murmur heard.     No friction rub.   Pulmonary:      Effort: Pulmonary effort is normal.      Breath sounds: Normal breath sounds and air entry.   Abdominal:      Palpations: There is no hepatomegaly, splenomegaly or mass.   Musculoskeletal:         General: No swelling or deformity. Normal range of motion.      Cervical back: Neck supple.      Right lower leg: No edema.      Left lower leg: No edema.   Lymphadenopathy:      Cervical: No cervical adenopathy.      Upper Body:      Right upper body: No axillary adenopathy.      Left upper body: No axillary adenopathy.      Lower Body: No right inguinal adenopathy. No left inguinal adenopathy.   Neurological:      Cranial Nerves: Cranial nerves 2-12 are intact. No cranial nerve deficit.       Sensory: No sensory deficit.      Motor: Motor function is intact. No weakness.      Gait: Gait is intact.      Deep Tendon Reflexes: Reflexes normal.      Comments: The patient is alert and oriented x2. The patient is aphasic.   Psychiatric:         Mood and Affect: Mood normal. Mood is not anxious or depressed. Affect is not angry.         Behavior: Behavior is not agitated.         Thought Content: Thought content normal.         Judgment: Judgment normal.     LAB WORK: Laboratory studies reviewed.    Assessment/Plan  Problem List Items Addressed This Visit           ICD-10-CM    Dementia - Primary F03.90    Benign hypertension I10    ASHD (arteriosclerotic heart disease) I25.10    Diabetes mellitus (Multi) E11.9    At risk for falls Z91.81    Hypothyroidism E03.9     Other Visit Diagnoses         Codes      Aphasia     R47.01      Schizophrenia, unspecified type     F20.9      Seizure disorder (Multi)     G40.909      Benign prostatic hyperplasia, unspecified whether lower urinary tract symptoms present     N40.0        1. Dementia, unchanged.  2. Aphasia, with speech therapy.  3. Schizophrenia, on medication.  4. Seizure disorder, on antiepileptic.  5. Diabetes, on insulin.  6. Hypertension, med controlled.  7. Hypothyroidism, on levothyroxine.  8. ASHD, on aspirin.  9. BPH, on tamsulosin.  10. Fall risk, fall precautions.    Scribe Attestation  By signing my name below, IChastity Scribe attest that this documentation has been prepared under the direction and in the presence of Sergio Islas MD.     All medical record entries made by the scribe were personally dictated by me I have reviewed the chart and agree the record accurately reflects my personal performance of his history physical examination and management      Electronically Signed By: Sergio Islas MD   7/12/25 11:46 PM

## 2025-07-11 VITALS
RESPIRATION RATE: 16 BRPM | SYSTOLIC BLOOD PRESSURE: 128 MMHG | HEART RATE: 78 BPM | DIASTOLIC BLOOD PRESSURE: 82 MMHG | TEMPERATURE: 98 F

## 2025-07-11 ASSESSMENT — ENCOUNTER SYMPTOMS
FEVER: 0
CHILLS: 0

## 2025-07-12 NOTE — PROGRESS NOTES
PLACE OF SERVICE:  [facility]    This is a subsequent visit.    Subjective   Patient ID: Clive Woody is a 89 y.o. male who presents for Follow-up.    Mr. Clive Woody is a 89-year-old male with history of dementia with seizure disorder and diabetes.  He is unable to care for himself and requires supportive care.    Review of Systems   Constitutional:  Negative for chills and fever.   Cardiovascular:  Negative for chest pain.   All other systems reviewed and are negative.    Objective   /82   Pulse 78   Temp 36.7 °C (98 °F)   Resp 16     Physical Exam  Vitals reviewed.   Constitutional:       General: He is not in acute distress.     Comments: This is a well-developed, well-nourished male, sitting in a chair   HENT:      Right Ear: Tympanic membrane, ear canal and external ear normal.      Left Ear: Tympanic membrane, ear canal and external ear normal.   Eyes:      General: No scleral icterus.     Pupils: Pupils are equal, round, and reactive to light.   Neck:      Vascular: No carotid bruit.   Cardiovascular:      Heart sounds: Normal heart sounds, S1 normal and S2 normal. No murmur heard.     No friction rub.   Pulmonary:      Effort: Pulmonary effort is normal.      Breath sounds: Normal breath sounds and air entry.   Abdominal:      Palpations: There is no hepatomegaly, splenomegaly or mass.   Musculoskeletal:         General: No swelling or deformity. Normal range of motion.      Cervical back: Neck supple.      Right lower leg: No edema.      Left lower leg: No edema.   Lymphadenopathy:      Cervical: No cervical adenopathy.      Upper Body:      Right upper body: No axillary adenopathy.      Left upper body: No axillary adenopathy.      Lower Body: No right inguinal adenopathy. No left inguinal adenopathy.   Neurological:      Cranial Nerves: Cranial nerves 2-12 are intact. No cranial nerve deficit.      Sensory: No sensory deficit.      Motor: Motor function is intact. No weakness.       Gait: Gait is intact.      Deep Tendon Reflexes: Reflexes normal.      Comments: The patient is alert and oriented x2. The patient is aphasic.   Psychiatric:         Mood and Affect: Mood normal. Mood is not anxious or depressed. Affect is not angry.         Behavior: Behavior is not agitated.         Thought Content: Thought content normal.         Judgment: Judgment normal.     LAB WORK: Laboratory studies reviewed.    Assessment/Plan   Problem List Items Addressed This Visit           ICD-10-CM    Dementia - Primary F03.90    Benign hypertension I10    ASHD (arteriosclerotic heart disease) I25.10    Diabetes mellitus (Multi) E11.9    At risk for falls Z91.81    Hypothyroidism E03.9     Other Visit Diagnoses         Codes      Aphasia     R47.01      Schizophrenia, unspecified type     F20.9      Seizure disorder (Multi)     G40.909      Benign prostatic hyperplasia, unspecified whether lower urinary tract symptoms present     N40.0        1. Dementia, unchanged.  2. Aphasia, with speech therapy.  3. Schizophrenia, on medication.  4. Seizure disorder, on antiepileptic.  5. Diabetes, on insulin.  6. Hypertension, med controlled.  7. Hypothyroidism, on levothyroxine.  8. ASHD, on aspirin.  9. BPH, on tamsulosin.  10. Fall risk, fall precautions.    Scribe Attestation  By signing my name below, I, Joyce Aguilar attest that this documentation has been prepared under the direction and in the presence of Sergio Islas MD.     All medical record entries made by the scribe were personally dictated by me I have reviewed the chart and agree the record accurately reflects my personal performance of his history physical examination and management